# Patient Record
Sex: FEMALE | Race: WHITE | NOT HISPANIC OR LATINO | Employment: OTHER | URBAN - METROPOLITAN AREA
[De-identification: names, ages, dates, MRNs, and addresses within clinical notes are randomized per-mention and may not be internally consistent; named-entity substitution may affect disease eponyms.]

---

## 2022-07-01 ENCOUNTER — APPOINTMENT (OUTPATIENT)
Dept: CT IMAGING | Facility: HOSPITAL | Age: 87
End: 2022-07-01

## 2022-07-01 ENCOUNTER — APPOINTMENT (OUTPATIENT)
Dept: GENERAL RADIOLOGY | Facility: HOSPITAL | Age: 87
End: 2022-07-01

## 2022-07-01 ENCOUNTER — HOSPITAL ENCOUNTER (INPATIENT)
Facility: HOSPITAL | Age: 87
LOS: 4 days | Discharge: NURSING, REHAB OR LONG-TERM CARE | End: 2022-07-06
Attending: EMERGENCY MEDICINE | Admitting: INTERNAL MEDICINE

## 2022-07-01 DIAGNOSIS — I66.21 OCCLUSION AND STENOSIS OF RIGHT POSTERIOR CEREBRAL ARTERY: ICD-10-CM

## 2022-07-01 DIAGNOSIS — H53.9 VISUAL DISTURBANCE: Primary | ICD-10-CM

## 2022-07-01 DIAGNOSIS — I67.1 SUPRACLINOID CAROTID ARTERY ANEURYSM, SMALL: ICD-10-CM

## 2022-07-01 DIAGNOSIS — R41.841 COGNITIVE COMMUNICATION DEFICIT: ICD-10-CM

## 2022-07-01 PROBLEM — R20.2 PARESTHESIA AND PAIN OF LEFT EXTREMITY: Status: ACTIVE | Noted: 2022-07-01

## 2022-07-01 PROBLEM — M79.609 PARESTHESIA AND PAIN OF LEFT EXTREMITY: Status: ACTIVE | Noted: 2022-07-01

## 2022-07-01 PROBLEM — I48.91 A-FIB: Status: ACTIVE | Noted: 2022-07-01

## 2022-07-01 PROBLEM — R27.0 ATAXIA: Status: ACTIVE | Noted: 2022-07-01

## 2022-07-01 PROBLEM — R51.9 HEADACHE: Status: ACTIVE | Noted: 2022-07-01

## 2022-07-01 LAB
ALBUMIN SERPL-MCNC: 4.2 G/DL (ref 3.5–5.2)
ALBUMIN/GLOB SERPL: 1.9 G/DL
ALP SERPL-CCNC: 102 U/L (ref 39–117)
ALT SERPL W P-5'-P-CCNC: 14 U/L (ref 1–33)
ANION GAP SERPL CALCULATED.3IONS-SCNC: 10 MMOL/L (ref 5–15)
AST SERPL-CCNC: 17 U/L (ref 1–32)
BACTERIA UR QL AUTO: ABNORMAL /HPF
BASOPHILS # BLD AUTO: 0.02 10*3/MM3 (ref 0–0.2)
BASOPHILS NFR BLD AUTO: 0.4 % (ref 0–1.5)
BILIRUB SERPL-MCNC: 0.6 MG/DL (ref 0–1.2)
BILIRUB UR QL STRIP: NEGATIVE
BUN SERPL-MCNC: 16 MG/DL (ref 8–23)
BUN/CREAT SERPL: 26.2 (ref 7–25)
CALCIUM SPEC-SCNC: 9.1 MG/DL (ref 8.6–10.5)
CHLORIDE SERPL-SCNC: 107 MMOL/L (ref 98–107)
CLARITY UR: CLEAR
CO2 SERPL-SCNC: 24 MMOL/L (ref 22–29)
COLOR UR: YELLOW
CREAT SERPL-MCNC: 0.61 MG/DL (ref 0.57–1)
DEPRECATED RDW RBC AUTO: 43.8 FL (ref 37–54)
EGFRCR SERPLBLD CKD-EPI 2021: 86.7 ML/MIN/1.73
EOSINOPHIL # BLD AUTO: 0.06 10*3/MM3 (ref 0–0.4)
EOSINOPHIL NFR BLD AUTO: 1.2 % (ref 0.3–6.2)
ERYTHROCYTE [DISTWIDTH] IN BLOOD BY AUTOMATED COUNT: 13.8 % (ref 12.3–15.4)
FLUAV SUBTYP SPEC NAA+PROBE: NOT DETECTED
FLUBV RNA ISLT QL NAA+PROBE: NOT DETECTED
FOLATE SERPL-MCNC: 19.7 NG/ML (ref 4.78–24.2)
GLOBULIN UR ELPH-MCNC: 2.2 GM/DL
GLUCOSE SERPL-MCNC: 102 MG/DL (ref 65–99)
GLUCOSE UR STRIP-MCNC: NEGATIVE MG/DL
HCT VFR BLD AUTO: 44.2 % (ref 34–46.6)
HGB BLD-MCNC: 15.3 G/DL (ref 12–15.9)
HGB UR QL STRIP.AUTO: NEGATIVE
HOLD SPECIMEN: NORMAL
HYALINE CASTS UR QL AUTO: ABNORMAL /LPF
IMM GRANULOCYTES # BLD AUTO: 0.01 10*3/MM3 (ref 0–0.05)
IMM GRANULOCYTES NFR BLD AUTO: 0.2 % (ref 0–0.5)
KETONES UR QL STRIP: NEGATIVE
LEUKOCYTE ESTERASE UR QL STRIP.AUTO: ABNORMAL
LYMPHOCYTES # BLD AUTO: 0.64 10*3/MM3 (ref 0.7–3.1)
LYMPHOCYTES NFR BLD AUTO: 12.5 % (ref 19.6–45.3)
MAGNESIUM SERPL-MCNC: 2 MG/DL (ref 1.6–2.4)
MCH RBC QN AUTO: 30.2 PG (ref 26.6–33)
MCHC RBC AUTO-ENTMCNC: 34.6 G/DL (ref 31.5–35.7)
MCV RBC AUTO: 87.2 FL (ref 79–97)
MONOCYTES # BLD AUTO: 0.43 10*3/MM3 (ref 0.1–0.9)
MONOCYTES NFR BLD AUTO: 8.4 % (ref 5–12)
NEUTROPHILS NFR BLD AUTO: 3.96 10*3/MM3 (ref 1.7–7)
NEUTROPHILS NFR BLD AUTO: 77.3 % (ref 42.7–76)
NITRITE UR QL STRIP: NEGATIVE
NRBC BLD AUTO-RTO: 0 /100 WBC (ref 0–0.2)
PH UR STRIP.AUTO: 7.5 [PH] (ref 5–8)
PLATELET # BLD AUTO: 213 10*3/MM3 (ref 140–450)
PMV BLD AUTO: 10.5 FL (ref 6–12)
POTASSIUM SERPL-SCNC: 3.7 MMOL/L (ref 3.5–5.2)
PROT SERPL-MCNC: 6.4 G/DL (ref 6–8.5)
PROT UR QL STRIP: NEGATIVE
RBC # BLD AUTO: 5.07 10*6/MM3 (ref 3.77–5.28)
RBC # UR STRIP: ABNORMAL /HPF
REF LAB TEST METHOD: ABNORMAL
SARS-COV-2 RNA PNL SPEC NAA+PROBE: NOT DETECTED
SODIUM SERPL-SCNC: 141 MMOL/L (ref 136–145)
SP GR UR STRIP: 1.04 (ref 1–1.03)
SQUAMOUS #/AREA URNS HPF: ABNORMAL /HPF
TROPONIN T SERPL-MCNC: 0.01 NG/ML (ref 0–0.03)
TSH SERPL DL<=0.05 MIU/L-ACNC: 2.04 UIU/ML (ref 0.27–4.2)
UROBILINOGEN UR QL STRIP: ABNORMAL
VIT B12 BLD-MCNC: 267 PG/ML (ref 211–946)
WBC # UR STRIP: ABNORMAL /HPF
WBC NRBC COR # BLD: 5.12 10*3/MM3 (ref 3.4–10.8)
WHOLE BLOOD HOLD COAG: NORMAL
WHOLE BLOOD HOLD SPECIMEN: NORMAL

## 2022-07-01 PROCEDURE — 70496 CT ANGIOGRAPHY HEAD: CPT

## 2022-07-01 PROCEDURE — 0 IOPAMIDOL PER 1 ML: Performed by: EMERGENCY MEDICINE

## 2022-07-01 PROCEDURE — 85025 COMPLETE CBC W/AUTO DIFF WBC: CPT

## 2022-07-01 PROCEDURE — 99223 1ST HOSP IP/OBS HIGH 75: CPT | Performed by: INTERNAL MEDICINE

## 2022-07-01 PROCEDURE — 93005 ELECTROCARDIOGRAM TRACING: CPT | Performed by: EMERGENCY MEDICINE

## 2022-07-01 PROCEDURE — 84443 ASSAY THYROID STIM HORMONE: CPT | Performed by: INTERNAL MEDICINE

## 2022-07-01 PROCEDURE — 81001 URINALYSIS AUTO W/SCOPE: CPT | Performed by: EMERGENCY MEDICINE

## 2022-07-01 PROCEDURE — 70498 CT ANGIOGRAPHY NECK: CPT

## 2022-07-01 PROCEDURE — 80053 COMPREHEN METABOLIC PANEL: CPT | Performed by: EMERGENCY MEDICINE

## 2022-07-01 PROCEDURE — 83735 ASSAY OF MAGNESIUM: CPT | Performed by: EMERGENCY MEDICINE

## 2022-07-01 PROCEDURE — 99285 EMERGENCY DEPT VISIT HI MDM: CPT

## 2022-07-01 PROCEDURE — 36415 COLL VENOUS BLD VENIPUNCTURE: CPT

## 2022-07-01 PROCEDURE — 82746 ASSAY OF FOLIC ACID SERUM: CPT

## 2022-07-01 PROCEDURE — 71045 X-RAY EXAM CHEST 1 VIEW: CPT

## 2022-07-01 PROCEDURE — 84484 ASSAY OF TROPONIN QUANT: CPT | Performed by: EMERGENCY MEDICINE

## 2022-07-01 PROCEDURE — 99223 1ST HOSP IP/OBS HIGH 75: CPT

## 2022-07-01 PROCEDURE — 82607 VITAMIN B-12: CPT

## 2022-07-01 PROCEDURE — 87636 SARSCOV2 & INF A&B AMP PRB: CPT | Performed by: EMERGENCY MEDICINE

## 2022-07-01 RX ORDER — ATORVASTATIN CALCIUM 40 MG/1
40 TABLET, FILM COATED ORAL NIGHTLY
Status: DISCONTINUED | OUTPATIENT
Start: 2022-07-01 | End: 2022-07-03

## 2022-07-01 RX ORDER — SODIUM CHLORIDE 0.9 % (FLUSH) 0.9 %
10 SYRINGE (ML) INJECTION AS NEEDED
Status: DISCONTINUED | OUTPATIENT
Start: 2022-07-01 | End: 2022-07-06 | Stop reason: HOSPADM

## 2022-07-01 RX ORDER — ASPIRIN 81 MG/1
81 TABLET, CHEWABLE ORAL DAILY
Status: DISCONTINUED | OUTPATIENT
Start: 2022-07-01 | End: 2022-07-02

## 2022-07-01 RX ORDER — ASPIRIN 300 MG/1
300 SUPPOSITORY RECTAL DAILY
Status: DISCONTINUED | OUTPATIENT
Start: 2022-07-01 | End: 2022-07-02

## 2022-07-01 RX ORDER — SODIUM CHLORIDE 0.9 % (FLUSH) 0.9 %
10 SYRINGE (ML) INJECTION EVERY 12 HOURS SCHEDULED
Status: DISCONTINUED | OUTPATIENT
Start: 2022-07-01 | End: 2022-07-06 | Stop reason: HOSPADM

## 2022-07-01 RX ADMIN — ASPIRIN 300 MG: 300 SUPPOSITORY RECTAL at 18:30

## 2022-07-01 RX ADMIN — Medication 10 ML: at 22:28

## 2022-07-01 RX ADMIN — ATORVASTATIN CALCIUM 40 MG: 40 TABLET, FILM COATED ORAL at 22:57

## 2022-07-01 RX ADMIN — IOPAMIDOL 75 ML: 755 INJECTION, SOLUTION INTRAVENOUS at 15:38

## 2022-07-01 NOTE — H&P
"    [facility]  HISTORY AND PHYSICAL    Patient Name: Yolie Jacob  : 1935  MRN: [id number]  Primary Care Physician: Provider, No Known  Date of admission: 2022    Subjective   Subjective     Chief Complaint:  Ataxia, headache, dizziness, blurry vision    HPI:  Yolie Jacob is a 87 y.o. female PMH afib (eliquis), hx of COVID (2021) presenting with ataxia, headache, dizziness, blurry vision, decreased sensation to the left arm and left leg. Pt states she was discharged from [facility] yesterday. 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 She was discharged home to follow up with Dr Amato. Today, CTA head/neck showed possible P2 occlusion. Stroke team consulted. Pt reports taking eliquis daily but has not taken a dose since 22 because she [facility] to [facility].     Review of Systems   Constitutional: Positive for activity change and fatigue.   Eyes: Positive for visual disturbance.        Blurred vision   Respiratory: Negative.    Cardiovascular: Negative.    Gastrointestinal: Negative.    Endocrine: Negative.    Genitourinary: Positive for frequency.   Musculoskeletal: Positive for arthralgias and gait problem.   Skin: Negative.    Allergic/Immunologic: Negative.    Neurological: Positive for dizziness, weakness, numbness and headaches.        Left arm and left leg paresthesia. Bilateral lower extremity weakness.    Hematological: Negative.    Psychiatric/Behavioral: Positive for confusion.        Pt is A&OX3 but she states her head feels \"fuzzy\".         All other systems reviewed and are negative.     Personal History     Past Medical History:   Diagnosis Date   • A-fib (HCC)        Past Surgical History:   Procedure Laterality Date   • APPENDECTOMY     • BREAST SURGERY     • CATARACT " EXTRACTION     •  SECTION     • EYE SURGERY     • HEMORRHOIDECTOMY     • INTRAOCULAR LENS INSERTION     • TONSILLECTOMY         Family History:  family history is not on file. Otherwise pertinent FHx was reviewed and unremarkable.     Social History:  reports that she has never smoked. She does not have any smokeless tobacco history on file. She reports that she does not drink alcohol and does not use drugs.  Social History     Social History Narrative   • Not on file       Medications:  Apixaban, Multiple Vitamins-Minerals, b complex-vitamin c-folic acid, mupirocin, and triamcinolone    Allergies   Allergen Reactions   • Codeine Other (See Comments)     Passed out   • Morphine Itching   • Penicillins Itching   • Sulfa Antibiotics Hives   • Doxycycline Palpitations   • Keflex [Cephalexin] Palpitations       Objective   Objective     Vital Signs:   Temp:  [98.4 °F (36.9 °C)] 98.4 °F (36.9 °C)  Heart Rate:  [72-81] 75  Resp:  [16-18] 16  BP: (152-183)/() 152/89    Physical Exam   Constitutional: Awake, alert, NAD  HENT: NCAT, mucous membranes dry  Respiratory: Clear to auscultation bilaterally, nonlabored respirations   Cardiovascular: RRR, no murmurs, rubs, or gallops, palpable pedal pulses bilaterally  Gastrointestinal: Positive bowel sounds, soft, nontender, nondistended  Musculoskeletal: No bilateral ankle edema, no clubbing or cyanosis to extremities  Psychiatric: Appropriate affect, cooperative  Neurologic: Oriented x 3, decreased sensation to the LUE/LLE. Slight facial droop, lower extremity weakness, speech clear  Skin: No rashes    Results Reviewed:  I have personally reviewed most recent indicated data and agree with findings including:  [x]  Laboratory  [x]  Radiology  [x]  EKG/Telemetry  []  Pathology  []  Cardiac/Vascular Studies  []  Old records  []  Other:  Most pertinent findings include:  CTA head and neck showed possible P2 occlusion.    LAB RESULTS:      Lab 22  1440   WBC 5.12    HEMOGLOBIN 15.3   HEMATOCRIT 44.2   PLATELETS 213   NEUTROS ABS 3.96   IMMATURE GRANS (ABS) 0.01   LYMPHS ABS 0.64*   MONOS ABS 0.43   EOS ABS 0.06   MCV 87.2         Lab 07/01/22  1440   SODIUM 141   POTASSIUM 3.7   CHLORIDE 107   CO2 24.0   ANION GAP 10.0   BUN 16   CREATININE 0.61   EGFR 86.7   GLUCOSE 102*   CALCIUM 9.1   MAGNESIUM 2.0         Lab 07/01/22  1440   TOTAL PROTEIN 6.4   ALBUMIN 4.20   GLOBULIN 2.2   ALT (SGPT) 14   AST (SGOT) 17   BILIRUBIN 0.6   ALK PHOS 102         Lab 07/01/22  1440   TROPONIN T 0.014                 Brief Urine Lab Results  (Last result in the past 365 days)      Color   Clarity   Blood   Leuk Est   Nitrite   Protein   CREAT   Urine HCG        07/01/22 1557 Yellow   Clear   Negative   Trace   Negative   Negative               Microbiology Results (last 10 days)     Procedure Component Value - Date/Time    COVID PRE-OP / PRE-PROCEDURE SCREENING ORDER (NO ISOLATION) - Swab, Nasopharynx [[id number]]  (Normal) Collected: 07/01/22 1555    Lab Status: Final result Specimen: Swab from Nasopharynx Updated: 07/01/22 1617    Narrative:      The following orders were created for panel order COVID PRE-OP / PRE-PROCEDURE SCREENING ORDER (NO ISOLATION) - Swab, Nasopharynx.  Procedure                               Abnormality         Status                     ---------                               -----------         ------                     COVID-19 and FLU A/B PCR...[[id number]]  Normal              Final result                 Please view results for these tests on the individual orders.    COVID-19 and FLU A/B PCR - Swab, Nasopharynx [[id number]]  (Normal) Collected: 07/01/22 1555    Lab Status: Final result Specimen: Swab from Nasopharynx Updated: 07/01/22 1617     COVID19 Not Detected     Influenza A PCR Not Detected     Influenza B PCR Not Detected    Narrative:      Fact sheet for providers: https://www.fda.gov/media/841875/download    Fact sheet for patients:  https://www.fda.gov/media/051563/download    Test performed by PCR.          CT Angiogram Neck    Result Date: 7/1/2022  CT ANGIOGRAM HEAD-, CT ANGIOGRAM NECK-  Date of Exam: 7/1/2022 3:33 PM  Indication: headache, h/o recently dxd aneurysm.  Comparison Exams: None available.  Technique:  Multiple axial images were obtained from the aortic arch through the vertex before and after the administration contrast for CT angiogram of the brain. Volumetric imaging was performed and multiplanar and 3-dimensional angiographic images were reformatted on an independent workstation. Automatic exposure intraoperative reconstruction methods were utilized to minimize radiation dose.   FINDINGS:  CTA NECK: There is mild calcified plaque of the aortic arch.  Origins of the great vessels appear widely patent.  Right brachiocephalic artery is patent.  Visualized right subclavian artery is patent.  Right common carotid artery is patent without focal stenosis.  No significant plaque the right carotid bifurcation.  No significant stenosis of the right internal or external carotid arteries.  Left common carotid artery is patent without stenosis.  There is minimal calcified plaque the left carotid bifurcation.  No significant stenosis of the left internal or external carotid arteries.  The left subclavian artery appears patent.  Vertebral arteries are codominant and patent without definite stenosis.  Soft tissues of the neck appear within normal limits.  There is calcified granuloma within the medial right lung apex.  Visualized lung apices are otherwise clear.  There are degenerative changes of spine. There are no lytic or sclerotic bony lesions identified.  CTA HEAD: There is fusiform aneurysmal enlargement of the supraclinoid segment of the left internal carotid artery.  This measures up to 8 mm in maximum diameter.  The left middle cerebral artery and anterior cerebral arteries are of normal caliber.  There is irregularity of the left  anterior cerebral artery consistent with intracranial atherosclerosis.  Right intracranial internal carotid artery is patent without focal stenosis or aneurysm.  Right anterior and middle cerebral arteries appear patent without focal stenosis.  Vertebral arteries are patent skull base.  Basilar artery is patent without focal stenosis or occlusion.  The bilateral superior cerebellar arteries appear patent.  There is fetal-type origin of the left posterior cerebral artery.  This arises from the aneurysmal segment of the left internal carotid artery.  There is irregularity of the distal branch of the left posterior cerebral artery compatible with intracranial atherosclerosis.  There is occlusion of the P2 segment of the right posterior cerebral artery.  No posterior circulation aneurysm identified.  There is no pathologic intracranial contrast enhancement.  Globes and orbits appear within normal limits.  Visualized paranasal sinuses and mastoid air cells appear clear.      Impression:  1.  No evidence of carotid or vertebral artery stenosis. 2.  Fusiform aneurysmal enlargement of the supraclinoid segment of the left internal carotid artery.  The left anterior cerebral and middle cerebral arteries are of normal caliber. 2.  There is mild irregularity of the left anterior cerebral artery and a distal branch of the left posterior cerebral artery consistent with intracranial atherosclerosis. 3.  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 Findings personally discussed with Dr. Riggins of the emergency department at 4:20 PM on 7/1/2022  This report was finalized on 7/1/2022 4:25 PM by Noman Varghese MD.      XR Chest 1 View    Result Date: 7/1/2022  DATE OF EXAM: 7/1/2022 2:04 PM  PROCEDURE: XR CHEST 1 VW-  INDICATIONS: Weak/Dizzy/AMS triage protocol  COMPARISON: No comparisons available.  TECHNIQUE: Single radiographic AP view of the chest was obtained.  FINDINGS: History indicates generalized  weakness and dizziness. The heart shadow is in the upper range of normal size. Pulmonary vasculature appears normal. Lungs are well-expanded to mildly hyperinflated, and show a coarsened, mildly reticular interstitial disease pattern which may reflect mild senescent change, possibly changes of obstructive or reactive airways disease. There is a small area of linear scarring in the left lateral mid lung, but no other significant focal disease. No effusion or pneumothorax is seen.      Impression: Borderline heart shadow enlargement and mild chronic appearing interstitial changes. No clearly acute chest pathology is identified.  This report was finalized on 7/1/2022 2:20 PM by Dr. Mil Betancourt MD.      CT Angiogram Head    Result Date: 7/1/2022  CT ANGIOGRAM HEAD-, CT ANGIOGRAM NECK-  Date of Exam: 7/1/2022 3:33 PM  Indication: headache, h/o recently dxd aneurysm.  Comparison Exams: None available.  Technique:  Multiple axial images were obtained from the aortic arch through the vertex before and after the administration contrast for CT angiogram of the brain. Volumetric imaging was performed and multiplanar and 3-dimensional angiographic images were reformatted on an independent workstation. Automatic exposure intraoperative reconstruction methods were utilized to minimize radiation dose.   FINDINGS:  CTA NECK: There is mild calcified plaque of the aortic arch.  Origins of the great vessels appear widely patent.  Right brachiocephalic artery is patent.  Visualized right subclavian artery is patent.  Right common carotid artery is patent without focal stenosis.  No significant plaque the right carotid bifurcation.  No significant stenosis of the right internal or external carotid arteries.  Left common carotid artery is patent without stenosis.  There is minimal calcified plaque the left carotid bifurcation.  No significant stenosis of the left internal or external carotid arteries.  The left subclavian artery appears  patent.  Vertebral arteries are codominant and patent without definite stenosis.  Soft tissues of the neck appear within normal limits.  There is calcified granuloma within the medial right lung apex.  Visualized lung apices are otherwise clear.  There are degenerative changes of spine. There are no lytic or sclerotic bony lesions identified.  CTA HEAD: There is fusiform aneurysmal enlargement of the supraclinoid segment of the left internal carotid artery.  This measures up to 8 mm in maximum diameter.  The left middle cerebral artery and anterior cerebral arteries are of normal caliber.  There is irregularity of the left anterior cerebral artery consistent with intracranial atherosclerosis.  Right intracranial internal carotid artery is patent without focal stenosis or aneurysm.  Right anterior and middle cerebral arteries appear patent without focal stenosis.  Vertebral arteries are patent skull base.  Basilar artery is patent without focal stenosis or occlusion.  The bilateral superior cerebellar arteries appear patent.  There is fetal-type origin of the left posterior cerebral artery.  This arises from the aneurysmal segment of the left internal carotid artery.  There is irregularity of the distal branch of the left posterior cerebral artery compatible with intracranial atherosclerosis.  There is occlusion of the P2 segment of the right posterior cerebral artery.  No posterior circulation aneurysm identified.  There is no pathologic intracranial contrast enhancement.  Globes and orbits appear within normal limits.  Visualized paranasal sinuses and mastoid air cells appear clear.      Impression:  1.  No evidence of carotid or vertebral artery stenosis. 2.  Fusiform aneurysmal enlargement of the supraclinoid segment of the left internal carotid artery.  The left anterior cerebral and middle cerebral arteries are of normal caliber. 2.  There is mild irregularity of the left anterior cerebral artery and a distal  branch of the left posterior cerebral artery consistent with intracranial atherosclerosis. 3.  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 Findings personally discussed with Dr. Riggins of the emergency department at 4:20 PM on 7/1/2022  This report was finalized on 7/1/2022 4:25 PM by Noman Varghese MD.            Assessment & Plan   Assessment & Plan       Visual disturbance    Ataxia    Paresthesia and pain of left extremity    Headache    A-fib ([facility])    Hospital Course to date:  Yolie Jacob is a 87 y.o. female PMH afib (eliquis), hx of COVID (08/2021) presenting with ataxia, headache, dizziness, blurry vision, decreased sensation to the left arm and left leg.     Patient with disturbance, headache, ataxia, left-sided paresthesia (rule out CVA)  Right sided p2 (of right pca) occlusion on CT Angiogram  LICA Supraclinoid aneurysm 9 mm  -patient reports recently taking eliquis 2.5mg once daily instead of twice daily,? Possibly contributed to tia vs cva.  - Recently discharged from [facility] reporting aneurysm.  - CTA head and neck showed possible right P2 occlusion unsure if it is acute or chronic  - MRI brain without contrast pending; per neuro-stroke team if no large CVA found on the mri restart eliquis (patient states was on 2.5mg bid)  - Aspirin 81 mg daily, atorvastatin 40 mg at bedtime.  - A1c, lipid panel, TTE; further stroke workup/orders per neuro service  - PT/OT/SLP    Atrial fibrillation, rate controlled  - Patient reports taking Eliquis 2.5 mg daily prior to symptoms (instead of her prescribed 2.5mg bid). More recently had been taken off eliquis altogether since [facility].       HLD  - Atorvastatin 40 mg daily.  Lipid panel in a.m.        DVT prophylaxis: Compression device, planning to resume eliquis if MRI of head is appropriate.     CODE STATUS:    Level Of Support Discussed With: Patient  Code Status (Patient has no pulse  and is not breathing): CPR (Attempt to Resuscitate)  Medical Interventions (Patient has pulse or is breathing): Full Support      This note has been completed as part of a split-shared workflow.     Electronically signed by MEGHAN Laguna, 07/01/22, 6:28 PM EDT.      Attending   Admission Attestation       I have seen and examined the patient, performing an independent face-to-face diagnostic evaluation with plan of care reviewed and developed with the advanced practice clinician (APC).      Brief Summary Statement:   Yolie Jacob is a 87 y.o. female w/ hx afib, hl, recently admitted at [facility] few days ago for left sided arm and leg tingling, ataxia. Imaging apparently showed small aneurysm, eliquis originally held. Ultimately was d/c'd home yesterday, still with left sided parasthesias. Was told to restart her eliquis and present to [facility] if any worsening of symtpoms. Did not take her home eliquis. Today presented to [facility] due to persistence of symptoms, denies any worsening. Left sided arm tingling, facial tingling, bilateral lower extremity balance difficulties. No chest pain or palpitations. CtA h&n revealed left fusiform aneurysmal enlargement supraclinoid segment of the left ICA, mild luminal dz of left jessa and distal left pca, occlusion p2 segment right pca. Neuro-stroke service evaluated in ED, ordererd mri and requested admission  Remainder of detailed HPI is as noted by APC and has been reviewed and/or edited by me for completeness.    Attending Physical Exam:  Constitutional:Alert, oriented x 3, nontoxic appearing  Psych:Normal/appropriate affect  HEENT:NCAT, oropharynx clear  Neck: neck supple, full range of motion  Neuro: Face symmetric, speech clear,  Moves all extermities, gait not assesessed  Cardiac: RRR; No pretibial pitting edema  Resp: CTAB, normal effort  GI: abd soft, nontender  Skin: No extremity rash  Musculoskeletal/extremities: no cyanosis of extremities; no significant ankle  edema          Brief Assessment/Plan :  See detailed assessment and plan developed with APC which I have reviewed and/or edited for completeness.        Admission Status: I believe that this patient meets inpatient status due to suspected CVA, ataxia w/ further workup required and expected stay > 2 midnights      Mendel Guerrero MD  07/01/22

## 2022-07-01 NOTE — CONSULTS
Stroke Consult Note    Patient Name: Yolie Jacob   MRN: [id number]  Age: 87 y.o.  Sex: female  : 1935    Primary Care Physician: Provider, No Known  Referring Physician:  Dr. Emerson Riggins    TIME STROKE TEAM CALLED: 1628 EST     TIME PATIENT SEEN: 1640 EST    Handedness: Right  Race:     Chief Complaint/Reason for Consultation: Headache, dizziness, blurry vision, generalized weakness, gait instability, and left-sided paresthesias    HPI: Mrs. Jacob is an 87 year old female with known medical diagnoses of atrial fibrillation (on chronic Eliquis) and remote COVID-19 infection (Aug 2021) who presents to the ED today for further evaluation of generalized weakness, headache, blurry vision, gait instability, and dizziness x 3 days.  She was seen at [facility] ED yesterday with complaints of waxing and waning left-sided sensory deficits and intermitent confusion.  She underwent CT head which was negative for hemorrhage and/or acute process. CTA head/neck reportedly revealed a 9mm LMCA aneurysm as well as LM1 stenosis.  She was discharged home with an outpatient follow-up with Dr. Amato however she presented to our ED today with complaints of visual disturbance (blurry vision bilaterally).  She underwent another CTA head/neck today which was concerning for right P2 occlusion therefore the stroke team was consulted for further evaluation and recommendations.  Aneurysm is located within the supraclinoid segment of the LICA, not LMCA as previously reported by KATJA Ornelas.    The patient tells me that she has only been taking her Eliquis once daily however has not taken a dose since 2022 as the ED physician at [facility] told her not to take this medication due to her aneurysm.  She is a non-smoker, does not drink alcohol, and does not use illicit drugs.    Last Known Normal Date/Time: 2022 EST     Review of Systems   Constitutional: Positive for fatigue. Negative for chills and fever.    HENT: Positive for congestion. Negative for nosebleeds, rhinorrhea and trouble swallowing.    Eyes: Positive for visual disturbance. Negative for photophobia and pain.   Respiratory: Negative for cough, chest tightness and shortness of breath.    Cardiovascular: Negative for chest pain and palpitations.   Gastrointestinal: Negative for blood in stool, constipation, diarrhea, nausea and vomiting.   Genitourinary: Positive for urgency. Negative for difficulty urinating, dysuria and hematuria.   Musculoskeletal: Positive for arthralgias and gait problem.   Skin: Negative.    Neurological: Positive for dizziness, weakness, numbness and headaches. Negative for seizures, syncope, facial asymmetry and speech difficulty.   Hematological: Negative.    Psychiatric/Behavioral: Positive for confusion.      Past Medical History:   Diagnosis Date   • A-fib (HCC)      Past Surgical History:   Procedure Laterality Date   • APPENDECTOMY     • BREAST SURGERY     • CATARACT EXTRACTION     •  SECTION     • EYE SURGERY     • HEMORRHOIDECTOMY     • INTRAOCULAR LENS INSERTION     • TONSILLECTOMY       History reviewed. No pertinent family history.  Social History     Socioeconomic History   • Marital status:    Tobacco Use   • Smoking status: Never Smoker   Substance and Sexual Activity   • Alcohol use: No   • Drug use: No   • Sexual activity: Defer     Allergies   Allergen Reactions   • Codeine Other (See Comments)     Passed out   • Morphine Itching   • Penicillins Itching   • Sulfa Antibiotics Hives   • Doxycycline Palpitations   • Keflex [Cephalexin] Palpitations     Prior to Admission medications    Medication Sig Start Date End Date Taking? Authorizing Provider   Apixaban (ELIQUIS PO) Take  by mouth.    Emergency, Nurse STEVEN Hsieh   b complex-vitamin c-folic acid (NEPHRO-IRMA) 0.8 MG tablet tablet Take 1 tablet by mouth Daily.    Emergency, Nurse Epic, RN   Multiple Vitamins-Minerals (EYE VITAMINS PO) Take  by mouth.     Emergency, Nurse Epic, RN   mupirocin (BACTROBAN) 2 % ointment Apply  topically to the appropriate area as directed 3 (Three) Times a Day. 7/28/20   Sumit Chavez MD   triamcinolone (KENALOG) 0.1 % cream Apply  topically to the appropriate area as directed 3 (Three) Times a Day. 7/28/20   Sumit Chavez MD         Temp:  [98.4 °F (36.9 °C)] 98.4 °F (36.9 °C)  Heart Rate:  [72-81] 77  Resp:  [18] 18  BP: (155-183)/() 155/102  Neurological Exam  Mental Status  Alert. Oriented to person, place, time and situation. Orientation: Patient seems slightly confused in conversation. Oriented to person, place, and time. Speech is normal. Language is fluent with no aphasia.    Cranial Nerves  CN II: Visual fields full to confrontation.  CN III, IV, VI: Extraocular movements intact bilaterally.   Right pupil: Round. Reactive to light.   Left pupil: 2 mm. Round. Reactive to light. Questionable 3rd nerve palsy.  CN V: Facial sensation is normal.  CN VII: Full and symmetric facial movement.  CN VIII: Hearing intact bilaterally.  CN IX, X: Palate elevates symmetrically  CN XI: Shoulder shrug strength is normal.  CN XII: Tongue midline without atrophy or fasciculations.    Motor  Normal muscle bulk throughout. No fasciculations present. Normal muscle tone. Strength is 5/5 throughout all four extremities.    Sensory  Light touch abnormality: Left face, upper extremity, lower extremity.     Coordination    Finger-to-nose, rapid alternating movements and heel-to-shin normal bilaterally without dysmetria.    Gait    Unsteady.      Physical Exam  Vitals and nursing note reviewed.   Constitutional:       General: She is not in acute distress.     Appearance: Normal appearance. She is not ill-appearing.   HENT:      Head: Normocephalic.      Mouth/Throat:      Mouth: Mucous membranes are dry.   Eyes:      Extraocular Movements: Extraocular movements intact.   Cardiovascular:      Rate and Rhythm: Normal rate and  regular rhythm.   Pulmonary:      Effort: Pulmonary effort is normal. No respiratory distress.      Comments: On room air  Musculoskeletal:      Cervical back: Normal range of motion and neck supple. Tenderness (Right) present.      Right lower leg: No edema.      Left lower leg: No edema.   Skin:     General: Skin is warm and dry.   Neurological:      Mental Status: She is alert and oriented to person, place, and time.      Cranial Nerves: Cranial nerve deficit present.      Sensory: Sensory deficit present.      Motor: Weakness present.      Coordination: Coordination is intact. Coordination normal.      Deep Tendon Reflexes: Strength normal.      Comments: Occasionally confused in conversation   Psychiatric:         Mood and Affect: Mood normal.         Speech: Speech normal.         Behavior: Behavior normal.         Acute Stroke Data    Thrombolytic Inclusion / Exclusion Criteria    Time: 16:29 EDT  Person Administering Scale: MEGHAN Martel    Inclusion Criteria  [x]   18 years of age or greater   []   Onset of symptoms < 4.5 hours before beginning treatment (stroke onset = time patient was last seen well or without symptoms).   []   Diagnosis of acute ischemic stroke causing measurable disabling deficit (Complete Hemianopia, Any Aphasia, Visual or Sensory Extinction, Any weakness limiting sustained effort against gravity)   []   Any remaining deficit considered potentially disabling in view of patient and practitioner   Exclusion criteria (Do not proceed with Alteplase if any are checked under exclusion criteria)  [x]   Onset unknown or GREATER than 4.5 hours   []   ICH on CT/MRI   []   CT demonstrates hypodensity representing acute or subacute infarct   []   Significant head trauma or prior stroke in the previous 3 months   []   Symptoms suggestive of subarachnoid hemorrhage   []   History of un-ruptured intracranial aneurysm GREATER than 10 mm   []   Recent intracranial or intraspinal surgery  within the last 3 months   []   Arterial puncture at a non-compressible site in the previous 7 days   []   Active internal bleeding   []   Acute bleeding tendency   []   Platelet count LESS than 100,000 for known hematological diseases such as leukemia, thrombocytopenia or chronic cirrhosis   []   Current use of anticoagulant with INR GREATER than 1.7 or PT GREATER than 15 seconds, aPTT GREATER than 40 seconds   []   Heparin received within 48 hours, resulting in abnormally elevated aPTT GREATER than upper limit of normal   [x]   Current use of direct thrombin inhibitors or direct factor Xa inhibitors in the past 48 hours   []   Elevated blood pressure refractory to treatment (systolic GREATER than 185 mm/Hg or diastolic  GREATER than 110 mm/Hg   []   Suspected infective endocarditis and aortic arch dissection   []   Current use of therapeutic treatment dose of low-molecular-weight heparin (LMWH) within the previous 24 hours   []   Structural GI malignancy or bleed   Relative exclusion for all patients  []   Only minor non-disabling symptoms   []   Pregnancy   []   Seizure at onset with postictal residual neurological impairments   []   Major surgery or previous trauma within past 14 days   []   History of previous spontaneous ICH, intracranial neoplasm, or AV malformation   []   Postpartum (within previous 14 days)   []   Recent GI or urinary tract hemorrhage (within previous 21 days)   []   Recent acute MI (within previous 3 months)   []   History of un-ruptured intracranial aneurysm LESS than 10 mm   []   History of ruptured intracranial aneurysm   []   Blood glucose LESS than 50 mg/dL (2.7 mmol/L)   []   Dural puncture within the last 7 days   []   Known GREATER than 10 cerebral microbleeds   Additional exclusions for patients with symptoms onset between 3 and 4.5 hours.  [x]   Age > 80.   [x]   On any anticoagulants regardless of INR  >>> Warfarin (Coumadin), Heparin, Enoxaparin (Lovenox), fondaparinux  (Arixtra), bivalirudin (Angiomax), Argatroban, dabigatran (Pradaxa), rivaroxaban (Xarelto), or apixaban (Eliquis)   []   Severe stroke (NIHSS > 25).   []   History of BOTH diabetes and previous ischemic stroke.   []   The risks and benefits have been discussed with the patient or family related to the administration of IV thrombolytic therapy for stroke symptoms.   []   I have discussed and reviewed the patient's case and imaging with the attending prior to IV thrombolytic therapy.   N/A Time IV thrombolytic administered       Hospital Meds:  Scheduled-    Infusions-     PRNs- •  sodium chloride    Functional Status Prior to Current Stroke/Davidson Score: 0; patient states prior to symptom onset she was independent of all of her ADLs and was still driving.    NIH Stroke Scale  Time: 1650  Person Administering Scale: MEGHAN Martel    Interval: baseline  1a. Level of Consciousness: 0-->Alert, keenly responsive  1b. LOC Questions: 0-->Answers both questions correctly  1c. LOC Commands: 0-->Performs both tasks correctly  2. Best Gaze: 0-->Normal  3. Visual: 0-->No visual loss  4. Facial Palsy: 0-->Normal symmetrical movements  5a. Motor Arm, Left: 0-->No drift, limb holds 90 (or 45) degrees for full 10 secs  5b. Motor Arm, Right: 0-->No drift, limb holds 90 (or 45) degrees for full 10 secs  6a. Motor Leg, Left: 0-->No drift, leg holds 30 degree position for full 5 secs  6b. Motor Leg, Right: 0-->No drift, leg holds 30 degree position for full 5 secs  7. Limb Ataxia: 0-->Absent  8. Sensory: 1-->Mild-to-moderate sensory loss, patient feels pinprick is less sharp or is dull on the affected side, or there is a loss of superficial pain with pinprick, but patient is aware of being touched  9. Best Language: 0-->No aphasia, normal  10. Dysarthria: 0-->Normal  11. Extinction and Inattention (formerly Neglect): 0-->No abnormality    Total (NIH Stroke Scale): 1    Results Reviewed:  I have personally reviewed  current lab, radiology, and data and agree with results.    CTA head/neck with no evidence of flow limiting stenosis or LVO.   Left P2 occlusion, acute verses chronic, LICA supraclinoid aneurysm, 9mm.    Urinalysis + for Leuk esterase, negative nitrite, negative bacteria  H/H 15.3/44.2  Platelets 213  Glucose 102  Creatinine 0.61, BUN 16  ALT 14  AST 17  Sodium 141    Assessment/Plan:    This is an 87 year old female with known medical diagnoses of atrial fibrillation (on chronic Eliquis), hyperlipidemia, and remote COVID-19 infection (Aug 2021) who presents to the ED today for further evaluation of generalized weakness, headache, and dizziness x 3 days.  She is not a candidate for IV thrombolytic therapy secondary to chronic use of Eliquis and last known well > 0.5 hours.  She is not a candidate for endovascular therapy secondary to no evidence of large vessel occlusive stroke on CTA head/neck.  She will be admitted to the hospital service for further evaluation.    Antiplatelet PTA: None  Anticoagulant PTA: Eliquis 5 mg twice daily      1. Visual disturbances, left paresthesias, gait instability  -Right P2 occlusion noted on CTA head; unsure if this is acute or chronic  -MRI brain without contrast to further evaluate for stroke  -TIA/CVA orderset without thromboytic therapy has been initiated  -NPO until bedside nursing dysphagia screen completed  -ASA 81mg daily   -Atorvastatin 40mg nightly  -A1c and lipid panel in AM  -TTE   -Activity as tolerated  -PT/OT/SLP evaluation  -High fall risk    2. Atrial fibrilliation, chronic  -Patient states she was only taking Eliquis 5 mg once daily prior to symptoms and has not had a dose since the evening of 6/29/2022 as she was instructed by the emergency department physician at [facility] to discontinue this  -If MRI brain without is negative for large stroke can resume tonight  -Rate control per hospitalist    3. Hyperlipidemia  -Patient reports she does not take  any medicines for cholesterol  -Atorvastatin 40 mg nightly  -Lipid panel in a.m.    4. 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 Amato  -Strict blood pressure management, <160/80  -Add ASA 81mg daiy    Plan of care was discussed with the patient, family, and Dr. Riggins.  Stroke neurology will continue to follow.  Please call with any questions or concerns.  Thank you for this consult.    Tahira Urban, APRMITALI  July 1, 2022  16:29 EDT

## 2022-07-01 NOTE — ED PROVIDER NOTES
" EMERGENCY DEPARTMENT ENCOUNTER    Pt Name: Yolie Jacob  MRN: [id number]  Pt :   1935  Room Number:  KWABENA/KWABENA  Date of encounter:  2022  PCP: Provider, No Known  ED Provider: Emerson Riggins MD    Historian: Patient      HPI:  Chief Complaint: Generalized weakness, multiple complaints.        Context: Yolie Jacob is a 87 y.o. female who presents to the ED c/o symptoms which patient reports have been ongoing for between 2 and 3 days.  Patient reports that she was seen at [facility] and admitted 2 or 3 days ago, being discharged yesterday.  The only abnormality she is aware of is a \"aneurysm\".  The patient reports that she was admitted at that facility secondary to numbness in both hands and both feet with difficulty walking.  She notes that the headache she presented to that facility with is still present.  She notes generalized weakness and dizziness.  She notes some \"blurry vision\" which has been present for the last 2 to 3 days as well.  The patient lives with her son.  She has been too weak to walk today and actually had to crawl to go to the bathroom.  Her only current numbness is in the lateral portion of her left foot.  Patient was diagnosed with COVID in August.          PAST MEDICAL HISTORY  Past Medical History:   Diagnosis Date   • A-fib ([facility])          PAST SURGICAL HISTORY  Past Surgical History:   Procedure Laterality Date   • APPENDECTOMY     • BREAST SURGERY     • CATARACT EXTRACTION     •  SECTION     • EYE SURGERY     • HEMORRHOIDECTOMY     • INTRAOCULAR LENS INSERTION     • TONSILLECTOMY           FAMILY HISTORY  History reviewed. No pertinent family history.      SOCIAL HISTORY  Social History     Socioeconomic History   • Marital status:    Tobacco Use   • Smoking status: Never Smoker   Substance and Sexual Activity   • Alcohol use: No   • Drug use: No   • Sexual activity: Defer         ALLERGIES  Codeine, Morphine, Penicillins, Sulfa antibiotics, " Doxycycline, and Keflex [cephalexin]        REVIEW OF SYSTEMS  Review of Systems       All systems reviewed and negative except for those discussed in HPI.       PHYSICAL EXAM    I have reviewed the triage vital signs and nursing notes.    ED Triage Vitals [07/01/22 1424]   Temp Heart Rate Resp BP SpO2   98.4 °F (36.9 °C) 81 18 (!) 180/103 98 %      Temp src Heart Rate Source Patient Position BP Location FiO2 (%)   Oral Monitor Lying Right arm --       Physical Exam  GENERAL:   Appears pleasant, nontoxic but generally weak.  HENT: Nares patent.  Slightly dry mucous membranes  EYES: No scleral icterus.  Extraocular movements intact without diplopia  CV: Regular rhythm, regular rate.  No murmurs gallops rubs  RESPIRATORY: Normal effort.  No audible wheezes, rales or rhonchi.  Clear to auscultation  ABDOMEN: Soft, nontender  MUSCULOSKELETAL: No deformities.   NEURO: Alert, moves all extremities, follows commands.  No pronator drift or leg drift.  Fine touch sensation intact in upper and lower extremities.  Cranial nerves intact.  SKIN: Warm, dry, no rash visualized.        LAB RESULTS  Recent Results (from the past 24 hour(s))   Comprehensive Metabolic Panel    Collection Time: 07/01/22  2:40 PM    Specimen: Blood   Result Value Ref Range    Glucose 102 (H) 65 - 99 mg/dL    BUN 16 8 - 23 mg/dL    Creatinine 0.61 0.57 - 1.00 mg/dL    Sodium 141 136 - 145 mmol/L    Potassium 3.7 3.5 - 5.2 mmol/L    Chloride 107 98 - 107 mmol/L    CO2 24.0 22.0 - 29.0 mmol/L    Calcium 9.1 8.6 - 10.5 mg/dL    Total Protein 6.4 6.0 - 8.5 g/dL    Albumin 4.20 3.50 - 5.20 g/dL    ALT (SGPT) 14 1 - 33 U/L    AST (SGOT) 17 1 - 32 U/L    Alkaline Phosphatase 102 39 - 117 U/L    Total Bilirubin 0.6 0.0 - 1.2 mg/dL    Globulin 2.2 gm/dL    A/G Ratio 1.9 g/dL    BUN/Creatinine Ratio 26.2 (H) 7.0 - 25.0    Anion Gap 10.0 5.0 - 15.0 mmol/L    eGFR 86.7 >60.0 mL/min/1.73   Troponin    Collection Time: 07/01/22  2:40 PM    Specimen: Blood   Result  Value Ref Range    Troponin T 0.014 0.000 - 0.030 ng/mL   Magnesium    Collection Time: 07/01/22  2:40 PM    Specimen: Blood   Result Value Ref Range    Magnesium 2.0 1.6 - 2.4 mg/dL   Green Top (Gel)    Collection Time: 07/01/22  2:40 PM   Result Value Ref Range    Extra Tube Hold for add-ons.    Lavender Top    Collection Time: 07/01/22  2:40 PM   Result Value Ref Range    Extra Tube hold for add-on    Gold Top - SST    Collection Time: 07/01/22  2:40 PM   Result Value Ref Range    Extra Tube Hold for add-ons.    Gray Top    Collection Time: 07/01/22  2:40 PM   Result Value Ref Range    Extra Tube Hold for add-ons.    Light Blue Top    Collection Time: 07/01/22  2:40 PM   Result Value Ref Range    Extra Tube Hold for add-ons.    CBC Auto Differential    Collection Time: 07/01/22  2:40 PM    Specimen: Blood   Result Value Ref Range    WBC 5.12 3.40 - 10.80 10*3/mm3    RBC 5.07 3.77 - 5.28 10*6/mm3    Hemoglobin 15.3 12.0 - 15.9 g/dL    Hematocrit 44.2 34.0 - 46.6 %    MCV 87.2 79.0 - 97.0 fL    MCH 30.2 26.6 - 33.0 pg    MCHC 34.6 31.5 - 35.7 g/dL    RDW 13.8 12.3 - 15.4 %    RDW-SD 43.8 37.0 - 54.0 fl    MPV 10.5 6.0 - 12.0 fL    Platelets 213 140 - 450 10*3/mm3    Neutrophil % 77.3 (H) 42.7 - 76.0 %    Lymphocyte % 12.5 (L) 19.6 - 45.3 %    Monocyte % 8.4 5.0 - 12.0 %    Eosinophil % 1.2 0.3 - 6.2 %    Basophil % 0.4 0.0 - 1.5 %    Immature Grans % 0.2 0.0 - 0.5 %    Neutrophils, Absolute 3.96 1.70 - 7.00 10*3/mm3    Lymphocytes, Absolute 0.64 (L) 0.70 - 3.10 10*3/mm3    Monocytes, Absolute 0.43 0.10 - 0.90 10*3/mm3    Eosinophils, Absolute 0.06 0.00 - 0.40 10*3/mm3    Basophils, Absolute 0.02 0.00 - 0.20 10*3/mm3    Immature Grans, Absolute 0.01 0.00 - 0.05 10*3/mm3    nRBC 0.0 0.0 - 0.2 /100 WBC   TSH    Collection Time: 07/01/22  2:40 PM    Specimen: Blood   Result Value Ref Range    TSH 2.040 0.270 - 4.200 uIU/mL   COVID-19 and FLU A/B PCR - Swab, Nasopharynx    Collection Time: 07/01/22  3:55 PM    Specimen:  Nasopharynx; Swab   Result Value Ref Range    COVID19 Not Detected Not Detected - Ref. Range    Influenza A PCR Not Detected Not Detected    Influenza B PCR Not Detected Not Detected   Urinalysis With Microscopic If Indicated (No Culture) - Urine, Clean Catch    Collection Time: 07/01/22  3:57 PM    Specimen: Urine, Clean Catch   Result Value Ref Range    Color, UA Yellow Yellow, Straw    Appearance, UA Clear Clear    pH, UA 7.5 5.0 - 8.0    Specific Gravity, UA 1.042 (H) 1.001 - 1.030    Glucose, UA Negative Negative    Ketones, UA Negative Negative    Bilirubin, UA Negative Negative    Blood, UA Negative Negative    Protein, UA Negative Negative    Leuk Esterase, UA Trace (A) Negative    Nitrite, UA Negative Negative    Urobilinogen, UA 0.2 E.U./dL 0.2 - 1.0 E.U./dL   Urinalysis, Microscopic Only - Urine, Clean Catch    Collection Time: 07/01/22  3:57 PM    Specimen: Urine, Clean Catch   Result Value Ref Range    RBC, UA 0-2 None Seen, 0-2 /HPF    WBC, UA 3-5 (A) None Seen, 0-2 /HPF    Bacteria, UA None Seen None Seen, Trace /HPF    Squamous Epithelial Cells, UA 0-2 None Seen, 0-2 /HPF    Hyaline Casts, UA 0-6 0 - 6 /LPF    Methodology Automated Microscopy        If labs were ordered, I independently reviewed the results.        RADIOLOGY  XR Chest 1 View    Result Date: 7/1/2022  DATE OF EXAM: 7/1/2022 2:04 PM  PROCEDURE: XR CHEST 1 VW-  INDICATIONS: Weak/Dizzy/AMS triage protocol  COMPARISON: No comparisons available.  TECHNIQUE: Single radiographic AP view of the chest was obtained.  FINDINGS: History indicates generalized weakness and dizziness. The heart shadow is in the upper range of normal size. Pulmonary vasculature appears normal. Lungs are well-expanded to mildly hyperinflated, and show a coarsened, mildly reticular interstitial disease pattern which may reflect mild senescent change, possibly changes of obstructive or reactive airways disease. There is a small area of linear scarring in the left  lateral mid lung, but no other significant focal disease. No effusion or pneumothorax is seen.      Borderline heart shadow enlargement and mild chronic appearing interstitial changes. No clearly acute chest pathology is identified.  This report was finalized on 7/1/2022 2:20 PM by Dr. Mil Betancourt MD.      CT Angiogram Head, CT Angiogram Neck    Result Date: 7/1/2022  CT ANGIOGRAM HEAD-, CT ANGIOGRAM NECK-  Date of Exam: 7/1/2022 3:33 PM  Indication: headache, h/o recently dxd aneurysm.  Comparison Exams: None available.  Technique:  Multiple axial images were obtained from the aortic arch through the vertex before and after the administration contrast for CT angiogram of the brain. Volumetric imaging was performed and multiplanar and 3-dimensional angiographic images were reformatted on an independent workstation. Automatic exposure intraoperative reconstruction methods were utilized to minimize radiation dose.   FINDINGS:  CTA NECK: There is mild calcified plaque of the aortic arch.  Origins of the great vessels appear widely patent.  Right brachiocephalic artery is patent.  Visualized right subclavian artery is patent.  Right common carotid artery is patent without focal stenosis.  No significant plaque the right carotid bifurcation.  No significant stenosis of the right internal or external carotid arteries.  Left common carotid artery is patent without stenosis.  There is minimal calcified plaque the left carotid bifurcation.  No significant stenosis of the left internal or external carotid arteries.  The left subclavian artery appears patent.  Vertebral arteries are codominant and patent without definite stenosis.  Soft tissues of the neck appear within normal limits.  There is calcified granuloma within the medial right lung apex.  Visualized lung apices are otherwise clear.  There are degenerative changes of spine. There are no lytic or sclerotic bony lesions identified.  CTA HEAD: There is fusiform  aneurysmal enlargement of the supraclinoid segment of the left internal carotid artery.  This measures up to 8 mm in maximum diameter.  The left middle cerebral artery and anterior cerebral arteries are of normal caliber.  There is irregularity of the left anterior cerebral artery consistent with intracranial atherosclerosis.  Right intracranial internal carotid artery is patent without focal stenosis or aneurysm.  Right anterior and middle cerebral arteries appear patent without focal stenosis.  Vertebral arteries are patent skull base.  Basilar artery is patent without focal stenosis or occlusion.  The bilateral superior cerebellar arteries appear patent.  There is fetal-type origin of the left posterior cerebral artery.  This arises from the aneurysmal segment of the left internal carotid artery.  There is irregularity of the distal branch of the left posterior cerebral artery compatible with intracranial atherosclerosis.  There is occlusion of the P2 segment of the right posterior cerebral artery.  No posterior circulation aneurysm identified.  There is no pathologic intracranial contrast enhancement.  Globes and orbits appear within normal limits.  Visualized paranasal sinuses and mastoid air cells appear clear.       1.  No evidence of carotid or vertebral artery stenosis. 2.  Fusiform aneurysmal enlargement of the supraclinoid segment of the left internal carotid artery.  The left anterior cerebral and middle cerebral arteries are of normal caliber. 2.  There is mild irregularity of the left anterior cerebral artery and a distal branch of the left posterior cerebral artery consistent with intracranial atherosclerosis. 3.  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 Findings personally discussed with Dr. Riggins of the emergency department at 4:20 PM on 7/1/2022  This report was finalized on 7/1/2022 4:25 PM by Noman Varghese MD.        I ordered and reviewed the above noted  radiographic studies.        See radiologist's dictation for official interpretation.        PROCEDURES    Procedures    ECG 12 Lead   Preliminary Result             MEDICATIONS GIVEN IN ER    Medications   sodium chloride 0.9 % flush 10 mL (has no administration in time range)   aspirin chewable tablet 81 mg ( Oral Not Given:  See Alt 7/1/22 1830)     Or   aspirin suppository 300 mg (300 mg Rectal Given 7/1/22 1830)   iopamidol (ISOVUE-370) 76 % injection 100 mL (75 mL Intravenous Given 7/1/22 1538)         PROGRESS, DATA ANALYSIS, CONSULTS, AND MEDICAL DECISION MAKING    All labs have been independently reviewed by me.  All radiology studies have been reviewed by me and the radiologist dictating the report.   EKG's have been independently viewed and interpreted by me.      Differential diagnoses: Acute versus subacute versus chronic CVA.  Dehydration, occult infection, etc. considered as well.      ED Course as of 07/01/22 2114   Fri Jul 01, 2022   1626 I spoke with our radiologist about this patient who notes a complete occlusion of the right posterior cerebral artery.  I have now spoken with Bambi of the stroke team.  She will evaluate the patient as well.  She is familiar with her because of call yesterday about imaging performed at [facility].  We are still awaiting [facility] records to arrive. [MS]   1720 I spoke with Tahira, stroke navigator.  We both agree that admission for further stroke work-up is indicated.  I have communicated with Dr. Segundo, hospitalist for admission. 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

## 2022-07-02 ENCOUNTER — APPOINTMENT (OUTPATIENT)
Dept: CARDIOLOGY | Facility: HOSPITAL | Age: 87
End: 2022-07-02

## 2022-07-02 ENCOUNTER — APPOINTMENT (OUTPATIENT)
Dept: MRI IMAGING | Facility: HOSPITAL | Age: 87
End: 2022-07-02

## 2022-07-02 LAB
BH CV ECHO MEAS - AI P1/2T: 351.7 MSEC
BH CV ECHO MEAS - AO MAX PG: 7.7 MMHG
BH CV ECHO MEAS - AO MEAN PG: 4 MMHG
BH CV ECHO MEAS - AO ROOT DIAM: 3 CM
BH CV ECHO MEAS - AO V2 MAX: 139 CM/SEC
BH CV ECHO MEAS - AO V2 VTI: 25.6 CM
BH CV ECHO MEAS - AVA(I,D): 2.24 CM2
BH CV ECHO MEAS - EDV(CUBED): 64 ML
BH CV ECHO MEAS - EDV(MOD-SP2): 35.2 ML
BH CV ECHO MEAS - EDV(MOD-SP4): 31.6 ML
BH CV ECHO MEAS - EF(MOD-BP): 57.6 %
BH CV ECHO MEAS - EF(MOD-SP2): 56.5 %
BH CV ECHO MEAS - EF(MOD-SP4): 57.9 %
BH CV ECHO MEAS - ESV(CUBED): 15.6 ML
BH CV ECHO MEAS - ESV(MOD-SP2): 15.3 ML
BH CV ECHO MEAS - ESV(MOD-SP4): 13.3 ML
BH CV ECHO MEAS - FS: 37.5 %
BH CV ECHO MEAS - IVS/LVPW: 1 CM
BH CV ECHO MEAS - IVSD: 1.2 CM
BH CV ECHO MEAS - LA DIMENSION: 3.9 CM
BH CV ECHO MEAS - LAT PEAK E' VEL: 5.6 CM/SEC
BH CV ECHO MEAS - LV MASS(C)D: 165.5 GRAMS
BH CV ECHO MEAS - LV MAX PG: 6.8 MMHG
BH CV ECHO MEAS - LV MEAN PG: 3 MMHG
BH CV ECHO MEAS - LV V1 MAX: 130 CM/SEC
BH CV ECHO MEAS - LV V1 VTI: 22.5 CM
BH CV ECHO MEAS - LVIDD: 4 CM
BH CV ECHO MEAS - LVIDS: 2.5 CM
BH CV ECHO MEAS - LVOT AREA: 2.5 CM2
BH CV ECHO MEAS - LVOT DIAM: 1.8 CM
BH CV ECHO MEAS - LVPWD: 1.2 CM
BH CV ECHO MEAS - MED PEAK E' VEL: 7 CM/SEC
BH CV ECHO MEAS - MV A MAX VEL: 104 CM/SEC
BH CV ECHO MEAS - MV DEC SLOPE: 406 CM/SEC2
BH CV ECHO MEAS - MV DEC TIME: 0.2 MSEC
BH CV ECHO MEAS - MV E MAX VEL: 73.9 CM/SEC
BH CV ECHO MEAS - MV E/A: 0.71
BH CV ECHO MEAS - MV MAX PG: 3.7 MMHG
BH CV ECHO MEAS - MV MEAN PG: 2 MMHG
BH CV ECHO MEAS - MV P1/2T: 54.8 MSEC
BH CV ECHO MEAS - MV V2 VTI: 21.4 CM
BH CV ECHO MEAS - MVA(P1/2T): 4 CM2
BH CV ECHO MEAS - MVA(VTI): 2.7 CM2
BH CV ECHO MEAS - PA ACC TIME: 0.1 SEC
BH CV ECHO MEAS - PA PR(ACCEL): 36.3 MMHG
BH CV ECHO MEAS - RAP SYSTOLE: 3 MMHG
BH CV ECHO MEAS - RVSP: 17 MMHG
BH CV ECHO MEAS - SV(LVOT): 57.3 ML
BH CV ECHO MEAS - SV(MOD-SP2): 19.9 ML
BH CV ECHO MEAS - SV(MOD-SP4): 18.3 ML
BH CV ECHO MEAS - TAPSE (>1.6): 1.8 CM
BH CV ECHO MEAS - TR MAX PG: 14.3 MMHG
BH CV ECHO MEAS - TR MAX VEL: 186.3 CM/SEC
BH CV ECHO MEASUREMENTS AVERAGE E/E' RATIO: 11.73
BH CV VAS BP RIGHT ARM: NORMAL MMHG
BH CV XLRA - RV BASE: 2.2 CM
BH CV XLRA - RV LENGTH: 6.1 CM
BH CV XLRA - RV MID: 1.5 CM
BH CV XLRA - TDI S': 18 CM/SEC
CHOLEST SERPL-MCNC: 242 MG/DL (ref 0–200)
GLUCOSE BLDC GLUCOMTR-MCNC: 126 MG/DL (ref 70–130)
HBA1C MFR BLD: 5.3 % (ref 4.8–5.6)
HDLC SERPL-MCNC: 41 MG/DL (ref 40–60)
LDLC SERPL CALC-MCNC: 162 MG/DL (ref 0–100)
LDLC/HDLC SERPL: 3.89 {RATIO}
LEFT ATRIUM VOLUME INDEX: 27.9 ML/M2
LV EF 2D ECHO EST: 55 %
MAXIMAL PREDICTED HEART RATE: 133 BPM
QT INTERVAL: 312 MS
QTC INTERVAL: 357 MS
STRESS TARGET HR: 113 BPM
TRIGL SERPL-MCNC: 208 MG/DL (ref 0–150)
VLDLC SERPL-MCNC: 39 MG/DL (ref 5–40)

## 2022-07-02 PROCEDURE — 99233 SBSQ HOSP IP/OBS HIGH 50: CPT | Performed by: STUDENT IN AN ORGANIZED HEALTH CARE EDUCATION/TRAINING PROGRAM

## 2022-07-02 PROCEDURE — 82962 GLUCOSE BLOOD TEST: CPT

## 2022-07-02 PROCEDURE — G0378 HOSPITAL OBSERVATION PER HR: HCPCS

## 2022-07-02 PROCEDURE — 97166 OT EVAL MOD COMPLEX 45 MIN: CPT

## 2022-07-02 PROCEDURE — 93306 TTE W/DOPPLER COMPLETE: CPT | Performed by: INTERNAL MEDICINE

## 2022-07-02 PROCEDURE — 93306 TTE W/DOPPLER COMPLETE: CPT

## 2022-07-02 PROCEDURE — 99232 SBSQ HOSP IP/OBS MODERATE 35: CPT | Performed by: INTERNAL MEDICINE

## 2022-07-02 PROCEDURE — 80061 LIPID PANEL: CPT

## 2022-07-02 PROCEDURE — 83036 HEMOGLOBIN GLYCOSYLATED A1C: CPT

## 2022-07-02 PROCEDURE — 70551 MRI BRAIN STEM W/O DYE: CPT

## 2022-07-02 PROCEDURE — 97162 PT EVAL MOD COMPLEX 30 MIN: CPT

## 2022-07-02 RX ORDER — SODIUM CHLORIDE 0.9 % (FLUSH) 0.9 %
10 SYRINGE (ML) INJECTION AS NEEDED
Status: DISCONTINUED | OUTPATIENT
Start: 2022-07-02 | End: 2022-07-06 | Stop reason: HOSPADM

## 2022-07-02 RX ORDER — ALPRAZOLAM 0.25 MG/1
0.25 TABLET ORAL EVERY 4 HOURS PRN
Status: DISCONTINUED | OUTPATIENT
Start: 2022-07-02 | End: 2022-07-06 | Stop reason: HOSPADM

## 2022-07-02 RX ORDER — ACETAMINOPHEN 650 MG/1
650 SUPPOSITORY RECTAL EVERY 4 HOURS PRN
Status: DISCONTINUED | OUTPATIENT
Start: 2022-07-02 | End: 2022-07-06 | Stop reason: HOSPADM

## 2022-07-02 RX ORDER — POLYETHYLENE GLYCOL 3350 17 G/17G
17 POWDER, FOR SOLUTION ORAL DAILY PRN
Status: DISCONTINUED | OUTPATIENT
Start: 2022-07-02 | End: 2022-07-06 | Stop reason: HOSPADM

## 2022-07-02 RX ORDER — ACETAMINOPHEN 325 MG/1
650 TABLET ORAL EVERY 4 HOURS PRN
Status: DISCONTINUED | OUTPATIENT
Start: 2022-07-02 | End: 2022-07-06 | Stop reason: HOSPADM

## 2022-07-02 RX ORDER — AMOXICILLIN 250 MG
2 CAPSULE ORAL 2 TIMES DAILY
Status: DISCONTINUED | OUTPATIENT
Start: 2022-07-02 | End: 2022-07-06 | Stop reason: HOSPADM

## 2022-07-02 RX ORDER — BISACODYL 10 MG
10 SUPPOSITORY, RECTAL RECTAL DAILY PRN
Status: DISCONTINUED | OUTPATIENT
Start: 2022-07-02 | End: 2022-07-06 | Stop reason: HOSPADM

## 2022-07-02 RX ORDER — ASPIRIN 325 MG
325 TABLET ORAL DAILY
Status: DISCONTINUED | OUTPATIENT
Start: 2022-07-02 | End: 2022-07-06 | Stop reason: HOSPADM

## 2022-07-02 RX ORDER — ACETAMINOPHEN 160 MG/5ML
650 SOLUTION ORAL EVERY 4 HOURS PRN
Status: DISCONTINUED | OUTPATIENT
Start: 2022-07-02 | End: 2022-07-06 | Stop reason: HOSPADM

## 2022-07-02 RX ORDER — SODIUM CHLORIDE 0.9 % (FLUSH) 0.9 %
10 SYRINGE (ML) INJECTION EVERY 12 HOURS SCHEDULED
Status: DISCONTINUED | OUTPATIENT
Start: 2022-07-02 | End: 2022-07-06 | Stop reason: HOSPADM

## 2022-07-02 RX ORDER — BISACODYL 5 MG/1
5 TABLET, DELAYED RELEASE ORAL DAILY PRN
Status: DISCONTINUED | OUTPATIENT
Start: 2022-07-02 | End: 2022-07-06 | Stop reason: HOSPADM

## 2022-07-02 RX ADMIN — Medication 10 ML: at 08:56

## 2022-07-02 RX ADMIN — ACETAMINOPHEN 650 MG: 325 TABLET ORAL at 04:30

## 2022-07-02 RX ADMIN — Medication 10 ML: at 20:10

## 2022-07-02 RX ADMIN — ALPRAZOLAM 0.25 MG: 0.25 TABLET ORAL at 04:30

## 2022-07-02 RX ADMIN — RIVAROXABAN 20 MG: 20 TABLET, FILM COATED ORAL at 17:26

## 2022-07-02 RX ADMIN — Medication 10 ML: at 08:53

## 2022-07-02 RX ADMIN — SENNOSIDES AND DOCUSATE SODIUM 2 TABLET: 50; 8.6 TABLET ORAL at 08:53

## 2022-07-02 RX ADMIN — ASPIRIN 325 MG ORAL TABLET 325 MG: 325 PILL ORAL at 17:26

## 2022-07-02 RX ADMIN — ATORVASTATIN CALCIUM 40 MG: 40 TABLET, FILM COATED ORAL at 20:09

## 2022-07-02 RX ADMIN — ASPIRIN 81 MG CHEWABLE TABLET 81 MG: 81 TABLET CHEWABLE at 08:53

## 2022-07-02 RX ADMIN — Medication 10 ML: at 20:09

## 2022-07-02 NOTE — CASE MANAGEMENT/SOCIAL WORK
"Discharge Planning Assessment  [facility]     Patient Name: Yolie Jacob  MRN: [id number]  Today's Date: 7/2/2022    Admit Date: 7/1/2022     Discharge Needs Assessment     Row Name 07/02/22 1600       Living Environment    People in Home child(amy), adult  pt resides in [facility]    Name(s) of People in Home hira Hampton    Current Living Arrangements home    Primary Care Provided by self    Provides Primary Care For no one    Family Caregiver if Needed child(amy), adult    Family Caregiver Names hira Jacob    Quality of Family Relationships helpful;involved;supportive    Able to Return to Prior Arrangements yes       Resource/Environmental Concerns    Resource/Environmental Concerns none    Transportation Concerns none       Transition Planning    Patient/Family Anticipates Transition to inpatient rehabilitation facility    Patient/Family Anticipated Services at Transition skilled nursing    Transportation Anticipated health plan transportation       Discharge Needs Assessment    Readmission Within the Last 30 Days no previous admission in last 30 days    Equipment Currently Used at Home commode;shower chair    Concerns to be Addressed discharge planning    Anticipated Changes Related to Illness none    Equipment Needed After Discharge none    Provided Post Acute Provider List? N/A    Provided Post Acute Provider Quality & Resource List? N/A               Discharge Plan     Row Name 07/02/22 1602       Plan    Plan home    Patient/Family in Agreement with Plan yes    Plan Comments CM spoke with pt at bedside. Pt resides in [facility] with her son Memo and is independent of adls. Pt's son assists with houskeeping and meals. Pt reports she was driving prior to admission however states \" I don't know that I should be driving any longer after this\". Pt has a shower chair and bedside commode. Pt is not current with home health or outpatient medical services. Pt has not received her covid " vaccinations.Pt has Medicare and [place] [employer],denies concerns or disruption in coverage. Pt has prescription drug coverage and denies issues obtaining or affording current medications. CM reviewed therapy recs for rehab, pt is agreeable and would like referrals to [facility] and [place]. CM will make referrals on Tuesday 7/5 when liaisons return following holiday. CM will continue to follow.    Final Discharge Disposition Code 03 - skilled nursing facility (SNF)              Continued Care and Services - Admitted Since 7/1/2022    Coordination has not been started for this encounter.          Demographic Summary     Row Name 07/02/22 1559       General Information    Admission Type observation    Required Notices Provided Observation Status Notice    Referral Source admission list    Reason for Consult discharge planning    Preferred Language English    General Information Comments PCP- Candice Armstrong       Contact Information    Permission Granted to Share Info With                Functional Status     Row Name 07/02/22 1559       Functional Status    Usual Activity Tolerance fair    Current Activity Tolerance poor       Functional Status, IADL    Medications independent    Meal Preparation assistive person    Housekeeping assistive person    Laundry assistive person    Shopping assistive person       Mental Status    General Appearance WDL WDL       Mental Status Summary    Recent Changes in Mental Status/Cognitive Functioning no changes       Employment/    Employment/ Comments Pt has Medicare and [employer] [place] [employer],denies concerns or disruption in coverage. Pt has prescription drug coverage and denies issues obtaining or affording current medications.               Psychosocial    No documentation.                Abuse/Neglect    No documentation.                Legal    No documentation.                Substance Abuse    No documentation.                Patient Forms    No  documentation.                   Ericka Mccarthy RN

## 2022-07-02 NOTE — PROGRESS NOTES
[facility] Medicine Services  PROGRESS NOTE    Patient Name: Yolie Jacob  : 1935  MRN: [id number]    Date of Admission: 2022  Primary Care Physician: Provider, No Known    Subjective   Subjective     CC:  CVA workup    HPI:  BP elevated overnight. Otherwise VSS . Patient eating breakfast. Nursing noted visual changes but patient endorses these have been present since admission and no changes per her report.     ROS:  Gen- No fevers, chills  CV- No chest pain, palpitations  Resp- No cough, dyspnea  GI- No N/V/D, abd pain  +visual changes with blurry vision        Objective   Objective     Vital Signs:   Temp:  [98.1 °F (36.7 °C)-98.6 °F (37 °C)] 98.2 °F (36.8 °C)  Heart Rate:  [70-97] 72  Resp:  [16-18] 16  BP: (147-183)/() 151/85     Physical Exam:  GEN: NAD, resting in bed, awake,eating breakfast  HEENT: on room air, atraumatic, normocephalic  NECK: supple, no masses  RESP: on room air, normal effort  CV: on tele, sinus rhythm  PSYCH: normal affect, appropriate  NEURO: awake, alert, no focal deficits noted  MSK: no edema noted  SKIN: no rashes noted       Results Reviewed:  LAB RESULTS:      Lab 22  1440   WBC 5.12   HEMOGLOBIN 15.3   HEMATOCRIT 44.2   PLATELETS 213   NEUTROS ABS 3.96   IMMATURE GRANS (ABS) 0.01   LYMPHS ABS 0.64*   MONOS ABS 0.43   EOS ABS 0.06   MCV 87.2         Lab 22  1440   SODIUM 141   POTASSIUM 3.7   CHLORIDE 107   CO2 24.0   ANION GAP 10.0   BUN 16   CREATININE 0.61   EGFR 86.7   GLUCOSE 102*   CALCIUM 9.1   MAGNESIUM 2.0   TSH 2.040         Lab 22  1440   TOTAL PROTEIN 6.4   ALBUMIN 4.20   GLOBULIN 2.2   ALT (SGPT) 14   AST (SGOT) 17   BILIRUBIN 0.6   ALK PHOS 102         Lab 22  1440   TROPONIN T 0.014             Lab 22  1440   FOLATE 19.70   VITAMIN B 12 267         Brief Urine Lab Results  (Last result in the past 365 days)      Color   Clarity   Blood   Leuk Est   Nitrite   Protein   CREAT   Urine HCG         07/01/22 1557 Yellow   Clear   Negative   Trace   Negative   Negative                 Microbiology Results Abnormal     Procedure Component Value - Date/Time    COVID PRE-OP / PRE-PROCEDURE SCREENING ORDER (NO ISOLATION) - Swab, Nasopharynx [[id number]]  (Normal) Collected: 07/01/22 1555    Lab Status: Final result Specimen: Swab from Nasopharynx Updated: 07/01/22 1617    Narrative:      The following orders were created for panel order COVID PRE-OP / PRE-PROCEDURE SCREENING ORDER (NO ISOLATION) - Swab, Nasopharynx.  Procedure                               Abnormality         Status                     ---------                               -----------         ------                     COVID-19 and FLU A/B PCR...[[id number]]  Normal              Final result                 Please view results for these tests on the individual orders.    COVID-19 and FLU A/B PCR - Swab, Nasopharynx [[id number]]  (Normal) Collected: 07/01/22 1555    Lab Status: Final result Specimen: Swab from Nasopharynx Updated: 07/01/22 1617     COVID19 Not Detected     Influenza A PCR Not Detected     Influenza B PCR Not Detected    Narrative:      Fact sheet for providers: https://www.fda.gov/media/410676/download    Fact sheet for patients: https://www.fda.gov/media/945906/download    Test performed by PCR.          CT Angiogram Neck    Result Date: 7/1/2022  CT ANGIOGRAM HEAD-, CT ANGIOGRAM NECK-  Date of Exam: 7/1/2022 3:33 PM  Indication: headache, h/o recently dxd aneurysm.  Comparison Exams: None available.  Technique:  Multiple axial images were obtained from the aortic arch through the vertex before and after the administration contrast for CT angiogram of the brain. Volumetric imaging was performed and multiplanar and 3-dimensional angiographic images were reformatted on an independent workstation. Automatic exposure intraoperative reconstruction methods were utilized to minimize radiation dose.   FINDINGS:  CTA NECK: There is mild  calcified plaque of the aortic arch.  Origins of the great vessels appear widely patent.  Right brachiocephalic artery is patent.  Visualized right subclavian artery is patent.  Right common carotid artery is patent without focal stenosis.  No significant plaque the right carotid bifurcation.  No significant stenosis of the right internal or external carotid arteries.  Left common carotid artery is patent without stenosis.  There is minimal calcified plaque the left carotid bifurcation.  No significant stenosis of the left internal or external carotid arteries.  The left subclavian artery appears patent.  Vertebral arteries are codominant and patent without definite stenosis.  Soft tissues of the neck appear within normal limits.  There is calcified granuloma within the medial right lung apex.  Visualized lung apices are otherwise clear.  There are degenerative changes of spine. There are no lytic or sclerotic bony lesions identified.  CTA HEAD: There is fusiform aneurysmal enlargement of the supraclinoid segment of the left internal carotid artery.  This measures up to 8 mm in maximum diameter.  The left middle cerebral artery and anterior cerebral arteries are of normal caliber.  There is irregularity of the left anterior cerebral artery consistent with intracranial atherosclerosis.  Right intracranial internal carotid artery is patent without focal stenosis or aneurysm.  Right anterior and middle cerebral arteries appear patent without focal stenosis.  Vertebral arteries are patent skull base.  Basilar artery is patent without focal stenosis or occlusion.  The bilateral superior cerebellar arteries appear patent.  There is fetal-type origin of the left posterior cerebral artery.  This arises from the aneurysmal segment of the left internal carotid artery.  There is irregularity of the distal branch of the left posterior cerebral artery compatible with intracranial atherosclerosis.  There is occlusion of the P2  segment of the right posterior cerebral artery.  No posterior circulation aneurysm identified.  There is no pathologic intracranial contrast enhancement.  Globes and orbits appear within normal limits.  Visualized paranasal sinuses and mastoid air cells appear clear.      Impression:  1.  No evidence of carotid or vertebral artery stenosis. 2.  Fusiform aneurysmal enlargement of the supraclinoid segment of the left internal carotid artery.  The left anterior cerebral and middle cerebral arteries are of normal caliber. 2.  There is mild irregularity of the left anterior cerebral artery and a distal branch of the left posterior cerebral artery consistent with intracranial atherosclerosis. 3.  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 Findings personally discussed with Dr. Riggins of the emergency department at 4:20 PM on 7/1/2022  This report was finalized on 7/1/2022 4:25 PM by Noman Varghese MD.      XR Chest 1 View    Result Date: 7/1/2022  DATE OF EXAM: 7/1/2022 2:04 PM  PROCEDURE: XR CHEST 1 VW-  INDICATIONS: Weak/Dizzy/AMS triage protocol  COMPARISON: No comparisons available.  TECHNIQUE: Single radiographic AP view of the chest was obtained.  FINDINGS: History indicates generalized weakness and dizziness. The heart shadow is in the upper range of normal size. Pulmonary vasculature appears normal. Lungs are well-expanded to mildly hyperinflated, and show a coarsened, mildly reticular interstitial disease pattern which may reflect mild senescent change, possibly changes of obstructive or reactive airways disease. There is a small area of linear scarring in the left lateral mid lung, but no other significant focal disease. No effusion or pneumothorax is seen.      Impression: Borderline heart shadow enlargement and mild chronic appearing interstitial changes. No clearly acute chest pathology is identified.  This report was finalized on 7/1/2022 2:20 PM by Dr. Mil Betancourt MD.      CT  Angiogram Head    Result Date: 7/1/2022  CT ANGIOGRAM HEAD-, CT ANGIOGRAM NECK-  Date of Exam: 7/1/2022 3:33 PM  Indication: headache, h/o recently dxd aneurysm.  Comparison Exams: None available.  Technique:  Multiple axial images were obtained from the aortic arch through the vertex before and after the administration contrast for CT angiogram of the brain. Volumetric imaging was performed and multiplanar and 3-dimensional angiographic images were reformatted on an independent workstation. Automatic exposure intraoperative reconstruction methods were utilized to minimize radiation dose.   FINDINGS:  CTA NECK: There is mild calcified plaque of the aortic arch.  Origins of the great vessels appear widely patent.  Right brachiocephalic artery is patent.  Visualized right subclavian artery is patent.  Right common carotid artery is patent without focal stenosis.  No significant plaque the right carotid bifurcation.  No significant stenosis of the right internal or external carotid arteries.  Left common carotid artery is patent without stenosis.  There is minimal calcified plaque the left carotid bifurcation.  No significant stenosis of the left internal or external carotid arteries.  The left subclavian artery appears patent.  Vertebral arteries are codominant and patent without definite stenosis.  Soft tissues of the neck appear within normal limits.  There is calcified granuloma within the medial right lung apex.  Visualized lung apices are otherwise clear.  There are degenerative changes of spine. There are no lytic or sclerotic bony lesions identified.  CTA HEAD: There is fusiform aneurysmal enlargement of the supraclinoid segment of the left internal carotid artery.  This measures up to 8 mm in maximum diameter.  The left middle cerebral artery and anterior cerebral arteries are of normal caliber.  There is irregularity of the left anterior cerebral artery consistent with intracranial atherosclerosis.  Right  intracranial internal carotid artery is patent without focal stenosis or aneurysm.  Right anterior and middle cerebral arteries appear patent without focal stenosis.  Vertebral arteries are patent skull base.  Basilar artery is patent without focal stenosis or occlusion.  The bilateral superior cerebellar arteries appear patent.  There is fetal-type origin of the left posterior cerebral artery.  This arises from the aneurysmal segment of the left internal carotid artery.  There is irregularity of the distal branch of the left posterior cerebral artery compatible with intracranial atherosclerosis.  There is occlusion of the P2 segment of the right posterior cerebral artery.  No posterior circulation aneurysm identified.  There is no pathologic intracranial contrast enhancement.  Globes and orbits appear within normal limits.  Visualized paranasal sinuses and mastoid air cells appear clear.      Impression:  1.  No evidence of carotid or vertebral artery stenosis. 2.  Fusiform aneurysmal enlargement of the supraclinoid segment of the left internal carotid artery.  The left anterior cerebral and middle cerebral arteries are of normal caliber. 2.  There is mild irregularity of the left anterior cerebral artery and a distal branch of the left posterior cerebral artery consistent with intracranial atherosclerosis. 3.  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 Findings personally discussed with Dr. Riggins of the emergency department at 4:20 PM on 7/1/2022  This report was finalized on 7/1/2022 4:25 PM by Noman Varghese MD.            I have reviewed the medications:  Scheduled Meds:aspirin, 81 mg, Oral, Daily   Or  aspirin, 300 mg, Rectal, Daily  atorvastatin, 40 mg, Oral, Nightly  senna-docusate sodium, 2 tablet, Oral, BID  sodium chloride, 10 mL, Intravenous, Q12H  sodium chloride, 10 mL, Intravenous, Q12H      Continuous Infusions:   PRN Meds:.•  acetaminophen **OR** acetaminophen **OR**  acetaminophen  •  ALPRAZolam  •  senna-docusate sodium **AND** polyethylene glycol **AND** bisacodyl **AND** bisacodyl  •  sodium chloride  •  sodium chloride  •  sodium chloride    Assessment & Plan   Assessment & Plan     Active Hospital Problems    Diagnosis  POA   • Visual disturbance [H53.9]  Yes   • Ataxia [R27.0]  Unknown   • Paresthesia and pain of left extremity [M79.609, R20.2]  Unknown   • Headache [R51.9]  Unknown   • A-fib (HCC) [I48.91]  Unknown      Resolved Hospital Problems   No resolved problems to display.        Brief Hospital Course to date:    Yolie Jacob is a 87 y.o. female PMH afib (eliquis), hx of COVID (08/2021) presenting with ataxia, headache, dizziness, blurry vision, decreased sensation to the left arm and left leg.     This patient's problems and plans were partially entered by my partner and updated as appropriate by me 07/02/22.         Patient with disturbance, headache, ataxia, left-sided paresthesia (rule out CVA)  Right sided p2 (of right pca) occlusion on CT Angiogram  LICA Supraclinoid aneurysm 9 mm  -patient reports recently taking eliquis 2.5mg once daily instead of twice daily,? Possibly contributed to tia vs cva.  - Recently discharged from [facility] reporting aneurysm.  - CTA head and neck showed possible right P2 occlusion unsure if it is acute or chronic  - MRI brain without contrast is done with noted restricted diffusion in the territory of R PCA. T2 changes in both hemispheres which appear to be more small vessel change.   - Aspirin 81 mg daily, atorvastatin 40 mg at bedtime.  - A1c, lipid panel, TTE; further stroke workup/orders per neuro service  - PT/OT/SLP     Atrial fibrillation, rate controlled  - Patient reports taking Eliquis 2.5 mg daily prior to symptoms (instead of her prescribed 2.5mg bid). More recently had been taken off eliquis altogether since her recent admission at [facility] a few days ago.         HLD  - Atorvastatin 40 mg daily.  Lipid  panel noted        Expected Discharge Location and Transportation: ? rehab  Expected Discharge Date: 7/5    DVT prophylaxis:  Mechanical DVT prophylaxis orders are present.          CODE STATUS:   Code Status and Medical Interventions:   Ordered at: 07/01/22 1821     Level Of Support Discussed With:    Patient     Code Status (Patient has no pulse and is not breathing):    CPR (Attempt to Resuscitate)     Medical Interventions (Patient has pulse or is breathing):    Full Support       Kamilah Hdez MD  07/02/22

## 2022-07-02 NOTE — PLAN OF CARE
Problem: Adult Inpatient Plan of Care  Goal: Plan of Care Review  Recent Flowsheet Documentation  Taken 7/2/2022 1115 by Rex Thomas OT  Plan of Care Reviewed With:   patient   family  Outcome Evaluation:   VSS   Pt presents with fxl decline from PLOF, deficits in ADL performance, fxl mobility, occupational endurance. Pt limited by visual disturbance, L sided weakness, LUE mild deficits in FMC/GMC, decreased balance. Pt required Mod A sup>sit, Min A x 2 STS, Min A x 2/RW to ambulate in room, Min A UBD, set up/SBA grooming. Pt will benefit from skilled OT services to address deficits, facilitate increased fxl I. Recommend IRF at discharge.   Goal Outcome Evaluation:  Plan of Care Reviewed With: patient, family           Outcome Evaluation: VSS; Pt presents with fxl decline from PLOF, deficits in ADL performance, fxl mobility, occupational endurance. Pt limited by visual disturbance, L sided weakness, LUE mild deficits in FMC/GMC, decreased balance. Pt required Mod A sup>sit, Min A x 2 STS, Min A x 2/RW to ambulate in room, Min A UBD, set up/SBA grooming. Pt will benefit from skilled OT services to address deficits, facilitate increased fxl I. Recommend IRF at discharge.

## 2022-07-02 NOTE — PLAN OF CARE
Goal Outcome Evaluation:  Plan of Care Reviewed With: patient, family           Outcome Evaluation: PT PRESENTS WITH EVOLVING SYMPTOMS TO INCLUDE IMPAIRED BALANCE, L SIDED WEAKNESS, VISUAL DEFICITS, IMPAIRED COORDINATION AND DECLINE IN FUNCTIONAL MOBILITY. PT REQUIRED MOD ASSIST FOR SUPINE TO SIT, MIN ASSIST FOR STS AND MOD ASSIST TO AMBULATE X 14 FEET WITH R WALKER. RECOMMEND IRF AT D/C.

## 2022-07-02 NOTE — PLAN OF CARE
Problem: Pain Acute  Goal: Acceptable Pain Control and Functional Ability  Outcome: Ongoing, Progressing  Intervention: Prevent or Manage Pain  Flowsheets  Taken 7/2/2022 0715  Sensory Stimulation Regulation:   care clustered   quiet environment promoted  Bowel Elimination Promotion: adequate fluid intake promoted  Sleep/Rest Enhancement:   noise level reduced   room darkened   relaxation techniques promoted  Medication Review/Management: medications reviewed  Taken 7/2/2022 0004  Medication Review/Management: medications reviewed  Taken 7/1/2022 2223  Medication Review/Management: medications reviewed  Intervention: Develop Pain Management Plan  Flowsheets  Taken 7/2/2022 0410  Pain Management Interventions:   pillow support provided   position adjusted   see MAR  Taken 7/2/2022 0004  Pain Management Interventions:   pillow support provided   position adjusted  Taken 7/1/2022 2223  Pain Management Interventions:   no interventions per patient request   pillow support provided   position adjusted  Intervention: Optimize Psychosocial Wellbeing  Flowsheets  Taken 7/2/2022 0715  Supportive Measures:   active listening utilized   self-responsibility promoted   problem-solving facilitated   positive reinforcement provided   verbalization of feelings encouraged  Spiritual Activities Assistance: hope instilled  Taken 7/1/2022 2223  Diversional Activities: television     Problem: Adult Inpatient Plan of Care  Goal: Absence of Hospital-Acquired Illness or Injury  Intervention: Identify and Manage Fall Risk  Recent Flowsheet Documentation  Taken 7/2/2022 0004 by Amanda Mendoza, RN  Safety Promotion/Fall Prevention:   nonskid shoes/slippers when out of bed   clutter free environment maintained   safety round/check completed  Taken 7/1/2022 2223 by Amanda Mendoza, RN  Safety Promotion/Fall Prevention:   activity supervised   assistive device/personal items within reach   clutter free environment maintained   fall prevention  program maintained   lighting adjusted   gait belt   muscle strengthening facilitated   nonskid shoes/slippers when out of bed   room organization consistent   safety round/check completed  Intervention: Prevent Skin Injury  Recent Flowsheet Documentation  Taken 7/1/2022 2223 by Amanda Mendoza RN  Body Position: position changed independently  Intervention: Prevent and Manage VTE (Venous Thromboembolism) Risk  Recent Flowsheet Documentation  Taken 7/2/2022 0004 by Amanda Mendoza RN  Activity Management:   activity adjusted per tolerance   dorsiflexion/plantar flexion performed  Range of Motion: active ROM (range of motion) encouraged  Taken 7/1/2022 2223 by Amanda Mendoza RN  Activity Management:   activity adjusted per tolerance   dorsiflexion/plantar flexion performed   ambulated to bathroom  VTE Prevention/Management: dorsiflexion/plantar flexion performed  Range of Motion: ROM (range of motion) performed  Intervention: Prevent Infection  Recent Flowsheet Documentation  Taken 7/2/2022 0004 by Amanda Mendoza RN  Infection Prevention:   environmental surveillance performed   hand hygiene promoted   rest/sleep promoted  Taken 7/1/2022 2223 by Amanda Mendoza RN  Infection Prevention:   environmental surveillance performed   hand hygiene promoted   rest/sleep promoted  Goal: Optimal Comfort and Wellbeing  Intervention: Monitor Pain and Promote Comfort  Recent Flowsheet Documentation  Taken 7/2/2022 0410 by Amanda Mendoza RN  Pain Management Interventions:   pillow support provided   position adjusted   see MAR  Taken 7/2/2022 0004 by Amanda Mendoza RN  Pain Management Interventions:   pillow support provided   position adjusted  Taken 7/1/2022 2223 by Amanda Mendoza RN  Pain Management Interventions:   no interventions per patient request   pillow support provided   position adjusted  Intervention: Provide Person-Centered Care  Recent Flowsheet Documentation  Taken 7/1/2022 2223 by Amanda Mendoza  RN  Trust Relationship/Rapport:   care explained   choices provided   emotional support provided   questions answered   reassurance provided   empathic listening provided   questions encouraged   thoughts/feelings acknowledged   Goal Outcome Evaluation:

## 2022-07-02 NOTE — THERAPY EVALUATION
Patient Name: Yolie Jacob  : 1935    MRN: [id number]                              Today's Date: 2022       Admit Date: 2022    Visit Dx:     ICD-10-CM ICD-9-CM   1. Visual disturbance  H53.9 368.9   2. 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  I66.21 434.90     Patient Active Problem List   Diagnosis   • Visual disturbance   • Ataxia   • Paresthesia and pain of left extremity   • Headache   • A-fib (HCC)     Past Medical History:   Diagnosis Date   • A-fib (HCC)    • Arthritis    • Hypertension      Past Surgical History:   Procedure Laterality Date   • APPENDECTOMY     • BREAST SURGERY     • CATARACT EXTRACTION     •  SECTION     • EYE SURGERY     • HEMORRHOIDECTOMY     • INTRAOCULAR LENS INSERTION     • TONSILLECTOMY        General Information     Row Name 22 1115          OT Time and Intention    Document Type evaluation  -TA     Mode of Treatment occupational therapy  -TA     Row Name 22 1115          General Information    Patient Profile Reviewed yes  -TA     Prior Level of Function independent:;all household mobility;community mobility;gait;transfer;bed mobility;ADL's;driving;cooking;home management  -TA     Existing Precautions/Restrictions fall  -TA     Barriers to Rehab medically complex;visual deficit  -TA     Row Name 22 1115          Occupational Profile    Reason for Services/Referral (Occupational Profile) fxl decline from PLOF  -TA     Patient Goals (Occupational Profile) Rehab  -TA     Row Name 22 1115          Living Environment    People in Home child(amy), adult  -TA     Row Name 22 1115          Home Main Entrance    Number of Stairs, Main Entrance none  -TA     Row Name 22 1115          Stairs Within Home, Primary    Number of Stairs, Within Home, Primary twelve  to bedroom  -TA     Row Name 22 1115          Cognition    Orientation Status (Cognition) oriented to;person;place;situation;time  mild processing  delay  -TA     Row Name 07/02/22 1115          Safety Issues, Functional Mobility    Safety Issues Affecting Function (Mobility) safety precautions follow-through/compliance;positioning of assistive device  -TA     Impairments Affecting Function (Mobility) balance;cognition;coordination;endurance/activity tolerance;strength;sensation/sensory awareness  -TA     Cognitive Impairments, Mobility Safety/Performance safety precaution awareness;safety precaution follow-through  -TA           User Key  (r) = Recorded By, (t) = Taken By, (c) = Cosigned By    Initials Name Provider Type    TA Rex Thomas OT Occupational Therapist                 Mobility/ADL's     Row Name 07/02/22 1115          Bed Mobility    Bed Mobility supine-sit  -TA     Supine-Sit [place] (Bed Mobility) moderate assist (50% patient effort);verbal cues  -TA     Bed Mobility, Safety Issues decreased use of legs for bridging/pushing;decreased use of arms for pushing/pulling  -TA     Assistive Device (Bed Mobility) head of bed elevated;bed rails  -TA     Row Name 07/02/22 1115          Transfers    Transfers sit-stand transfer  -TA     Sit-Stand [place] (Transfers) minimum assist (75% patient effort);2 person assist;verbal cues  -TA     Row Name 07/02/22 1115          Sit-Stand Transfer    Assistive Device (Sit-Stand Transfers) walker, front-wheeled  -TA     Row Name 07/02/22 1115          Functional Mobility    Functional Mobility- Ind. Level minimum assist (75% patient effort);2 person assist required;verbal cues required  -TA     Functional Mobility- Device walker, front-wheeled  -TA     Functional Mobility-Distance (Feet) --  in room ambulation  -TA     Functional Mobility- Safety Issues balance decreased during turns;step length decreased;weight-shifting ability decreased  -TA     Functional Mobility- Comment VC/TC for RW mgt, keeping RW close with step to chair prior to sit  -TA     Row Name 07/02/22 1115          Activities of  Daily Living    BADL Assessment/Intervention upper body dressing;grooming  -TA     Row Name 07/02/22 1115          Upper Body Dressing Assessment/Training    [place] Level (Upper Body Dressing) don;pajama/robe;minimum assist (75% patient effort)  -TA     Position (Upper Body Dressing) edge of bed sitting  -TA     [facility] Name 07/02/22 1115          Grooming Assessment/Training    [place] Level (Grooming) wash face, hands;hair care, combing/brushing;set up;supervision  -TA     Position (Grooming) supported sitting  -TA           User Key  (r) = Recorded By, (t) = Taken By, (c) = Cosigned By    Initials Name Provider Type    Rex Hernandez, OT Occupational Therapist               Obj/Interventions     [facility] Name 07/02/22 1115          Sensory Assessment (Somatosensory)    Sensory Assessment (Somatosensory) right UE;sensation intact;left UE  -TA     Left UE Sensory Assessment light touch awareness;impaired  -TA     Sensory Subjective Reports numbness  -TA     [facility] Name 07/02/22 1115          Vision Assessment/Intervention    Visual Impairment/Limitations blurry vision  -TA     Visual Perception Impairment visual discrimination  -TA     [facility] Name 07/02/22 1115          Range of Motion Comprehensive    General Range of Motion bilateral upper extremity ROM WFL  -TA     [facility] Name 07/02/22 [facility]          Strength Comprehensive (MMT)    General Manual Muscle Testing (MMT) Assessment upper extremity strength deficits identified  -TA     Comment, General Manual Muscle Testing (MMT) Assessment RUE 4+/5, LUE 4-/5  -TA     [facility] Name 07/02/22 1115          Motor Skills    Motor Skills coordination  -TA     Coordination fine motor deficit;left;upper extremity;gross motor deficit;finger to nose  [id number]  -TA     [facility] Name 07/02/22 1115          Balance    Balance Assessment sitting dynamic balance;standing dynamic balance  -TA     Dynamic Sitting Balance standby assist  -TA     Position, Sitting Balance sitting edge of  bed  -TA     Dynamic Standing Balance minimal assist;verbal cues  -TA     Position/Device Used, Standing Balance walker, front-wheeled  -TA     Balance Interventions sit to stand;occupation based/functional task;weight shifting activity;vision occluded activity  -TA           User Key  (r) = Recorded By, (t) = Taken By, (c) = Cosigned By    Initials Name Provider Type    Rex Hernandez, OT Occupational Therapist               Goals/Plan     Row Name 07/02/22 1115          Transfer Goal 1 (OT)    Activity/Assistive Device (Transfer Goal 1, OT) sit-to-stand/stand-to-sit;bed-to-chair/chair-to-bed;toilet;walker, rolling  -TA     [place] Level/Cues Needed (Transfer Goal 1, OT) standby assist  -TA     Time Frame (Transfer Goal 1, OT) by discharge  -TA     Progress/Outcome (Transfer Goal 1, OT) goal ongoing  -TA     Row Name 07/02/22 1115          Dressing Goal 1 (OT)    Activity/Device (Dressing Goal 1, OT) lower body dressing  -TA     [place]/Cues Needed (Dressing Goal 1, OT) minimum assist (75% or more patient effort)  -TA     Time Frame (Dressing Goal 1, OT) by discharge  -TA     Progress/Outcome (Dressing Goal 1, OT) goal ongoing  -TA     Row Name 07/02/22 1115          Grooming Goal 1 (OT)    Activity/Device (Grooming Goal 1, OT) wash face, hands;oral care;hair care  in standing sink side  -TA     [place] (Grooming Goal 1, OT) standby assist  -TA     Time Frame (Grooming Goal 1, OT) by discharge  -TA     Progress/Outcome (Grooming Goal 1, OT) goal ongoing  -TA     Row Name 07/02/22 1115          Strength Goal 1 (OT)    Strength Goal 1 (OT) Pt will increase BUE MMS by 1/2 MMG to support fxl I with ADLs  -TA     Time Frame (Strength Goal 1, OT) by discharge  -TA     Progress/Outcome (Strength Goal 1, OT) goal ongoing  -TA     Row Name 07/02/22 1115          Therapy Assessment/Plan (OT)    Planned Therapy Interventions (OT) activity tolerance training;BADL retraining;functional balance  retraining;neuromuscular control/coordination retraining;occupation/activity based interventions;patient/caregiver education/training;ROM/therapeutic exercise;cognitive/visual perception retraining;strengthening exercise;transfer/mobility retraining  -TA           User Key  (r) = Recorded By, (t) = Taken By, (c) = Cosigned By    Initials Name Provider Type    Rex Hernandez, OT Occupational Therapist               Clinical Impression     [facility] Name 07/02/22 1115          Pain Assessment    Additional Documentation Pain Scale: FACES Pre/Post-Treatment (Group)  -TA     [facility] Name 07/02/22 1115          Pain Scale: FACES Pre/Post-Treatment    Pain: FACES Scale, Pretreatment 2-->hurts little bit  pt reported mild joint pain from lack of movement in bed, i,proved with fxl mobility  -TA     Posttreatment Pain Rating 0-->no hurt  -TA     Pain Location lower  -TA     Pain Location - back  -TA     Pre/Posttreatment Pain Comment Pt tolerated  -TA     Row Name 07/02/22 1115          Plan of Care Review    Plan of Care Reviewed With patient;family  -TA     Outcome Evaluation VSS; Pt presents with fxl decline from PLOF, deficits in ADL performance, fxl mobility, occupational endurance. Pt limited by visual disturbance, L sided weakness, LUE mild deficits in FMC/GMC, decreased balance. Pt required Mod A sup>sit, Min A x 2 STS, Min A x 2/RW to ambulate in room, Min A UBD, set up/SBA grooming. Pt will benefit from skilled OT services to address deficits, facilitate increased fxl I. Recommend IRF at discharge.  -TA     [facility] Name 07/02/22 1115          Therapy Assessment/Plan (OT)    Patient/Family Therapy Goal Statement (OT) Rehab  -TA     Rehab Potential (OT) good, to achieve stated therapy goals  -TA     Criteria for Skilled Therapeutic Interventions Met (OT) yes;skilled treatment is necessary  -TA     Therapy Frequency (OT) daily  -TA     Predicted Duration of Therapy Intervention (OT) 1 week  -TA     [facility] Name 07/02/22 111           Therapy Plan Review/Discharge Plan (OT)    Anticipated Discharge Disposition (OT) inpatient rehabilitation facility  -TA     Row Name 07/02/22 1115          Vital Signs    Pre Systolic BP Rehab 121  -TA     Pre Treatment Diastolic BP 72  -TA     Post Systolic BP Rehab 136  -TA     Post Treatment Diastolic BP 90  -TA     O2 Delivery Pre Treatment room air  -TA     O2 Delivery Intra Treatment room air  -TA     O2 Delivery Post Treatment room air  -TA     Pre Patient Position Supine  -TA     Intra Patient Position Standing  -TA     Post Patient Position Sitting  -TA     Row Name 07/02/22 1115          Positioning and Restraints    Pre-Treatment Position in bed  -TA     Post Treatment Position chair  -TA     In Chair notified nsg;reclined;call light within reach;encouraged to call for assist;exit alarm on;with family/caregiver;waffle cushion;LUE elevated;legs elevated  -TA           User Key  (r) = Recorded By, (t) = Taken By, (c) = Cosigned By    Initials Name Provider Type    Rex Hernandez OT Occupational Therapist               Outcome Measures     Row Name 07/02/22 1115          How much help from another is currently needed...    Putting on and taking off regular lower body clothing? 2  -TA     Bathing (including washing, rinsing, and drying) 2  -TA     Toileting (which includes using toilet bed pan or urinal) 2  -TA     Putting on and taking off regular upper body clothing 3  -TA     Taking care of personal grooming (such as brushing teeth) 3  -TA     Eating meals 4  -TA     AM-PAC 6 Clicks Score (OT) 16  -TA     Row Name 07/02/22 1115          Functional Assessment    Outcome Measure Options AM-PAC 6 Clicks Daily Activity (OT)  -TA           User Key  (r) = Recorded By, (t) = Taken By, (c) = Cosigned By    Initials Name Provider Type    Rex Hernandez OT Occupational Therapist                Occupational Therapy Education                 Title: PT OT SLP Therapies (Done)     Topic:  Occupational Therapy (Done)     Point: ADL training (Done)     Description:   Instruct learner(s) on proper safety adaptation and remediation techniques during self care or transfers.   Instruct in proper use of assistive devices.              Learning Progress Summary           Patient Acceptance, E, VU,NR by TA at 7/2/2022 1319                   Point: Home exercise program (Done)     Description:   Instruct learner(s) on appropriate technique for monitoring, assisting and/or progressing therapeutic exercises/activities.              Learning Progress Summary           Patient Acceptance, E, VU,NR by TA at 7/2/2022 1319                   Point: Precautions (Done)     Description:   Instruct learner(s) on prescribed precautions during self-care and functional transfers.              Learning Progress Summary           Patient Acceptance, E, VU,NR by TA at 7/2/2022 1319                   Point: Body mechanics (Done)     Description:   Instruct learner(s) on proper positioning and spine alignment during self-care, functional mobility activities and/or exercises.              Learning Progress Summary           Patient Acceptance, E, VU,NR by TA at 7/2/2022 1319                               User Key     Initials Effective Dates Name Provider Type Discipline    [facility] 06/16/21 -  Rex Thomas, OT Occupational Therapist OT              OT Recommendation and Plan  Planned Therapy Interventions (OT): activity tolerance training, BADL retraining, functional balance retraining, neuromuscular control/coordination retraining, occupation/activity based interventions, patient/caregiver education/training, ROM/therapeutic exercise, cognitive/visual perception retraining, strengthening exercise, transfer/mobility retraining  Therapy Frequency (OT): daily  Plan of Care Review  Plan of Care Reviewed With: patient, family  Outcome Evaluation: VSS; Pt presents with fxl decline from PLOF, deficits in ADL performance, fxl mobility,  occupational endurance. Pt limited by visual disturbance, L sided weakness, LUE mild deficits in FMC/GMC, decreased balance. Pt required Mod A sup>sit, Min A x 2 STS, Min A x 2/RW to ambulate in room, Min A UBD, set up/SBA grooming. Pt will benefit from skilled OT services to address deficits, facilitate increased fxl I. Recommend IRF at discharge.     Time Calculation:    Time Calculation- OT     Row Name 07/02/22 1115             Time Calculation- OT    OT Start Time 1115  ttc 0 minutes  -TA      Total Timed Code Minutes- OT 0 minute(s)  -TA      OT Received On 07/02/22  -TA      OT Goal Re-Cert Due Date 07/12/22  -TA              Untimed Charges    OT Eval/Re-eval Minutes 49  -TA              Total Minutes    Untimed Charges Total Minutes 49  -TA       Total Minutes 49  -TA            User Key  (r) = Recorded By, (t) = Taken By, (c) = Cosigned By    Initials Name Provider Type    TA Rex Thomas, OT Occupational Therapist              Therapy Charges for Today     Code Description Service Date Service Provider Modifiers Qty    [id number] HC OT EVAL MOD COMPLEXITY 4 7/2/2022 Rex Thomas OT GO 1               Rex Thomas OT  7/2/2022

## 2022-07-02 NOTE — PROGRESS NOTES
Stroke Progress Note       Chief Complaint:  Left visual field loss.     Subjective    Subjective     Subjective:  No acute events overnight       Review of Systems   Constitutional: No fatigue  HENT: Negative for nosebleeds and rhinorrhea.    Eyes: Negative for redness.   Respiratory: Negative for cough.    Gastrointestinal: Negative for anal bleeding.   Endocrine: Negative for polydipsia.   Genitourinary: Negative for enuresis and urgency.   Musculoskeletal: Negative for joint swelling.   Neurological: Negative for tremors.   Psychiatric/Behavioral: Negative for hallucinations.     Objective      Temp:  [98.1 °F (36.7 °C)-98.6 °F (37 °C)] 98.1 °F (36.7 °C)  Heart Rate:  [70-97] 75  Resp:  [16-18] 16  BP: (121-181)/() 121/73      NEURO    MENTAL STATUS: AAOx3, memory intact, fund of knowledge appropriate    LANG/SPEECH: Naming and repetition intact, fluent, follows 3-step commands    CRANIAL NERVES:  LHH   Pupils equal and reactive, EOMI intact, dysconjugate gaze, no nystagmus  No facial droop, cough and gag intact, shoulder shrug intact, tongue midline     MOTOR:  Moves all extremities equally    SENSORY: Normal to light touch all throughout     COORDINATION: Normal finger to nose and heel to shin, no tremor, no dysmetria    STATION: Not assessed due to patient condition    GAIT: Not assessed due to patient condition  Results Review:    I reviewed the patient's new clinical results.    Lab Results (last 24 hours)     Procedure Component Value Units Date/Time    Hemoglobin A1c [[id number]]  (Normal) Collected: 07/02/22 0726    Specimen: Blood Updated: 07/02/22 0916     Hemoglobin A1C 5.30 %     Narrative:      Hemoglobin A1C Ranges:    Increased Risk for Diabetes  5.7% to 6.4%  Diabetes                     >= 6.5%  Diabetic Goal                < 7.0%    Lipid Panel [[id number]]  (Abnormal) Collected: 07/02/22 0726    Specimen: Blood Updated: 07/02/22 0854     Total Cholesterol 242 mg/dL      Triglycerides 208  mg/dL      HDL Cholesterol 41 mg/dL      LDL Cholesterol  162 mg/dL      VLDL Cholesterol 39 mg/dL      LDL/HDL Ratio 3.89    Narrative:      Cholesterol Reference Ranges  (U.S. Department of Health and Human Services ATP III Classifications)    Desirable          <200 mg/dL  Borderline High    200-239 mg/dL  High Risk          >240 mg/dL      Triglyceride Reference Ranges  (U.S. Department of Health and Human Services ATP III Classifications)    Normal           <150 mg/dL  Borderline High  150-199 mg/dL  High             200-499 mg/dL  Very High        >500 mg/dL    HDL Reference Ranges  (U.S. Department of Health and Human Services ATP III Classifications)    Low     <40 mg/dl (major risk factor for CHD)  High    >60 mg/dl ('negative' risk factor for CHD)        LDL Reference Ranges  (U.S. Department of Health and Human Services ATP III Classifications)    Optimal          <100 mg/dL  Near Optimal     100-129 mg/dL  Borderline High  130-159 mg/dL  High             160-189 mg/dL  Very High        >189 mg/dL    POC Glucose Once [[id number]]  (Normal) Collected: 07/02/22 0005    Specimen: Blood Updated: 07/02/22 0005     Glucose 126 mg/dL      Comment: Meter: [id number] : [id number] López Hernandez       Vitamin B12 [[id number]]  (Normal) Collected: 07/01/22 1440    Specimen: Blood Updated: 07/01/22 2307     Vitamin B-12 267 pg/mL     Narrative:      Results may be falsely increased if patient taking Biotin.      Folate [[id number]]  (Normal) Collected: 07/01/22 1440    Specimen: Blood Updated: 07/01/22 2307     Folate 19.70 ng/mL     Narrative:      Results may be falsely increased if patient taking Biotin.      [place] Draw [[id number]] Collected: 07/01/22 1440    Specimen: Blood Updated: 07/01/22 1847    Narrative:      The following orders were created for panel order [place] Draw.  Procedure                               Abnormality         Status                     ---------                                -----------         ------                     Green Top (Gel)[[id number]]                                  Final result               Lavender Top[[id number]]                                     Final result               Gold Top - SST[[id number]]                                   Final result               Chan Top[[id number]]                                         Final result               Light Blue Top[[id number]]                                   Final result                 Please view results for these tests on the individual orders.    Chan Top [[id number]] Collected: 07/01/22 1440    Specimen: Blood Updated: 07/01/22 1847     Extra Tube Hold for add-ons.     Comment: Auto resulted.       TSH [[id number]]  (Normal) Collected: 07/01/22 1440    Specimen: Blood Updated: 07/01/22 1844     TSH 2.040 uIU/mL     Urinalysis With Microscopic If Indicated (No Culture) - Urine, Clean Catch [[id number]]  (Abnormal) Collected: 07/01/22 1557    Specimen: Urine, Clean Catch Updated: 07/01/22 1622     Color, UA Yellow     Appearance, UA Clear     pH, UA 7.5     Specific Gravity, UA 1.042     Glucose, UA Negative     Ketones, UA Negative     Bilirubin, UA Negative     Blood, UA Negative     Protein, UA Negative     Leuk Esterase, UA Trace     Nitrite, UA Negative     Urobilinogen, UA 0.2 E.U./dL    Urinalysis, Microscopic Only - Urine, Clean Catch [[id number]]  (Abnormal) Collected: 07/01/22 1557    Specimen: Urine, Clean Catch Updated: 07/01/22 1622     RBC, UA 0-2 /HPF      WBC, UA 3-5 /HPF      Bacteria, UA None Seen /HPF      Squamous Epithelial Cells, UA 0-2 /HPF      Hyaline Casts, UA 0-6 /LPF      Methodology Automated Microscopy        CT Angiogram Neck    Result Date: 7/1/2022   1.  No evidence of carotid or vertebral artery stenosis. 2.  Fusiform aneurysmal enlargement of the supraclinoid segment of the left internal carotid artery.  The left anterior cerebral and middle cerebral arteries are of normal caliber. 2.   There is mild irregularity of the left anterior cerebral artery and a distal branch of the left posterior cerebral artery consistent with intracranial atherosclerosis. 3.  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 Findings personally discussed with Dr. Riggins of the emergency department at 4:20 PM on 7/1/2022  This report was finalized on 7/1/2022 4:25 PM by Noman Varghese MD.      MRI Brain Without Contrast    Result Date: 7/2/2022  1.  Restricted diffusion in the territory of the right posterior cerebral artery. 2.  T2 signal changes involving both cerebral hemispheres occur reflect more chronic small vessel ischemic change. 3.  Susceptibility artifact within the thalamic areas and cerebellum that could be seen with hypertension. There additionally some susceptibility artifact in the right frontal parietal area. This also may relate to hypertension or could be seen with amyloid angiopathy.  This report was finalized on 7/2/2022 8:23 AM by Kailash Parker MD.      XR Chest 1 View    Result Date: 7/1/2022  Borderline heart shadow enlargement and mild chronic appearing interstitial changes. No clearly acute chest pathology is identified.  This report was finalized on 7/1/2022 2:20 PM by Dr. Mil Betancourt MD.      CT Angiogram Head    Result Date: 7/1/2022   1.  No evidence of carotid or vertebral artery stenosis. 2.  Fusiform aneurysmal enlargement of the supraclinoid segment of the left internal carotid artery.  The left anterior cerebral and middle cerebral arteries are of normal caliber. 2.  There is mild irregularity of the left anterior cerebral artery and a distal branch of the left posterior cerebral artery consistent with intracranial atherosclerosis. 3.  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 Findings personally discussed with Dr. Riggins of the emergency department at 4:20 PM on 7/1/2022  This report was finalized on 7/1/2022 4:25 PM by Noman Varghese MD.                 Assessment/Plan     Assessment/Plan:        Acute ischemic stroke, right posterior cerebral artery, likely etiology artery to artery/ cardioembolic  -CTA evidence right P2 occlusion, left M1 stenosis.   -Patient is not compliant with medications-forgets to take her morning doses.  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     Atrial fibrillation-switched to Xarelto full dose - to ensure compliance    Essential hypertension-normal blood pressure goals    Mixed hyperlipidemia-patient , goal LDL is less than 70      Neurology will continue to follow the patient.    Ulises Kay MD  07/02/22  16:20 EDT

## 2022-07-02 NOTE — THERAPY EVALUATION
Patient Name: Yolie Jacob  : 1935    MRN: [id number]                              Today's Date: 2022       Admit Date: 2022    Visit Dx:     ICD-10-CM ICD-9-CM   1. Visual disturbance  H53.9 368.9   2. 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  I66.21 434.90     Patient Active Problem List   Diagnosis   • Visual disturbance   • Ataxia   • Paresthesia and pain of left extremity   • Headache   • A-fib (HCC)     Past Medical History:   Diagnosis Date   • A-fib (HCC)    • Arthritis    • Hypertension      Past Surgical History:   Procedure Laterality Date   • APPENDECTOMY     • BREAST SURGERY     • CATARACT EXTRACTION     •  SECTION     • EYE SURGERY     • HEMORRHOIDECTOMY     • INTRAOCULAR LENS INSERTION     • TONSILLECTOMY        General Information     Row Name 22          Physical Therapy Time and Intention    Document Type evaluation  -CD     Mode of Treatment physical therapy  -CD     Row Name 22          General Information    Patient Profile Reviewed yes  -CD     Prior Level of Function independent:;all household mobility;community mobility;gait;transfer;bed mobility;ADL's;driving;cooking;home management  -CD     Existing Precautions/Restrictions fall  -CD     Barriers to Rehab medically complex;visual deficit  -CD     Row Name 22          Living Environment    People in Home child(amy), adult  PER [facility] PT, LIVES WITH 23 YEAR OLD SON.  -CD     Row Name 22          Home Main Entrance    Number of Stairs, Main Entrance none  -CD     Row Name 22          Stairs Within Home, Primary    Stairs, Within Home, Primary RECENTLY MOVED TO A CONDO AND HAS 12 STEPS TO BEDROOM.  -CD     Number of Stairs, Within Home, Primary twelve  -CD     Row Name 22          Cognition    Orientation Status (Cognition) oriented to;person;place;situation;time  -CD     Row Name 22          Safety Issues, Functional  Mobility    Safety Issues Affecting Function (Mobility) insight into deficits/self-awareness;safety precaution awareness;safety precautions follow-through/compliance  -CD     Impairments Affecting Function (Mobility) balance;cognition;coordination;endurance/activity tolerance;strength;sensation/sensory awareness  -CD     Cognitive Impairments, Mobility Safety/Performance insight into deficits/self-awareness;safety precaution awareness;safety precaution follow-through;sequencing abilities  -CD           User Key  (r) = Recorded By, (t) = Taken By, (c) = Cosigned By    Initials Name Provider Type    CD Selena Durand PT Physical Therapist               Mobility     Row Name 07/02/22 1659          Bed Mobility    Bed Mobility supine-sit  -CD     Supine-Sit [place] (Bed Mobility) moderate assist (50% patient effort);verbal cues  -CD     Assistive Device (Bed Mobility) bed rails;head of bed elevated  -CD     Row Name 07/02/22 1659          Transfers    Comment, (Transfers) CUES FOR HAND PLACEMENT.  -CD     Row Name 07/02/22 1659          Sit-Stand Transfer    Sit-Stand [place] (Transfers) minimum assist (75% patient effort);verbal cues  -CD     Assistive Device (Sit-Stand Transfers) walker, front-wheeled  -CD     Row Name 07/02/22 1659          Gait/Stairs (Locomotion)    [place] Level (Gait) moderate assist (50% patient effort);verbal cues  -CD     Assistive Device (Gait) walker, front-wheeled  -CD     Distance in Feet (Gait) 15 FEET  -CD     Deviations/Abnormal Patterns (Gait) weight shifting decreased;gait speed decreased;yang decreased;stride length decreased  -CD     Bilateral Gait Deviations forward flexed posture;heel strike decreased  -CD     Comment, (Gait/Stairs) PT REQUIRED MANUAL ASSIST TO GUIDE R WALKER AND CUES FOR INCREASED STEP LENGTH ON THE L.  -CD           User Key  (r) = Recorded By, (t) = Taken By, (c) = Cosigned By    Initials Name Provider Type    CD Selena Durand PT Physical  Therapist               Obj/Interventions     [facility] Name 07/02/22 1700          Range of Motion Comprehensive    General Range of Motion bilateral lower extremity ROM WFL  -CD     Row Name 07/02/22 1700          Strength Comprehensive (MMT)    General Manual Muscle Testing (MMT) Assessment lower extremity strength deficits identified  -CD     Comment, General Manual Muscle Testing (MMT) Assessment R LE 4+/5, L LE 4 TO 4-/5.  -CD     Row Name 07/02/22 1700          Motor Skills    Motor Skills coordination  -CD     Coordination gross motor deficit;left;lower extremity;heel to shin;minimal impairment  -CD     [facility] Name 07/02/22 1700          Balance    Balance Assessment sitting static balance;sitting dynamic balance;sit to stand dynamic balance;standing static balance  -CD     Static Sitting Balance standby assist  -CD     Dynamic Sitting Balance standby assist  -CD     Position, Sitting Balance sitting edge of bed  -CD     Static Standing Balance contact guard  -CD     Dynamic Standing Balance moderate assist  -CD     Position/Device Used, Standing Balance supported;walker, front-wheeled  -CD     Comment, Balance TRANSFERS AND GAIT SHORT DISTANCE WITH R WALKER AND CGA/MOD ASSIST.  -CD           User Key  (r) = Recorded By, (t) = Taken By, (c) = Cosigned By    Initials Name Provider Type    CD Selena Durand, PT Physical Therapist               Goals/Plan     [facility] Name 07/02/22 1706          Bed Mobility Goal 1 (PT)    Activity/Assistive Device (Bed Mobility Goal 1, PT) bed mobility activities, all  -CD     [place] Level/Cues Needed (Bed Mobility Goal 1, PT) contact guard required  -CD     Time Frame (Bed Mobility Goal 1, PT) long term goal (LTG);2 weeks  -CD     [facility] Name 07/02/22 1706          Transfer Goal 1 (PT)    Activity/Assistive Device (Transfer Goal 1, PT) sit-to-stand/stand-to-sit;bed-to-chair/chair-to-bed  -CD     [place] Level/Cues Needed (Transfer Goal 1, PT) contact guard required  -CD     Time  Frame (Transfer Goal 1, PT) long term goal (LTG);2 weeks  -CD     Row Name 07/02/22 1706          Gait Training Goal 1 (PT)    Activity/Assistive Device (Gait Training Goal 1, PT) gait (walking locomotion);walker, rolling  -CD     [place] Level (Gait Training Goal 1, PT) contact guard required  -CD     Distance (Gait Training Goal 1, PT) 200 FEET  -CD     Time Frame (Gait Training Goal 1, PT) long term goal (LTG);2 weeks  -CD     Row Name 07/02/22 1706          Therapy Assessment/Plan (PT)    Planned Therapy Interventions (PT) balance training;bed mobility training;gait training;home exercise program;transfer training;strengthening;patient/family education;neuromuscular re-education  -CD           User Key  (r) = Recorded By, (t) = Taken By, (c) = Cosigned By    Initials Name Provider Type    CD Selena Durand, PT Physical Therapist               Clinical Impression     Row Name 07/02/22 1703          Pain Scale: FACES Pre/Post-Treatment    Pain: FACES Scale, Pretreatment 2-->hurts little bit  -CD     Posttreatment Pain Rating 0-->no hurt  -CD     Pain Location lower  -CD     Pain Location - back  -CD     Pre/Posttreatment Pain Comment PT REPORTS IMPROVEMENT AFTER OOB ACTIVITY.  -CD     Row Name 07/02/22 1703          Plan of Care Review    Plan of Care Reviewed With patient;family  -CD     Outcome Evaluation PT PRESENTS WITH EVOLVING SYMPTOMS TO INCLUDE IMPAIRED BALANCE, L SIDED WEAKNESS, VISUAL DEFICITS, IMPAIRED COORDINATION AND DECLINE IN FUNCTIONAL MOBILITY. PT REQUIRED MOD ASSIST FOR SUPINE TO SIT, MIN ASSIST FOR STS AND MOD ASSIST TO AMBULATE X 14 FEET WITH R WALKER. RECOMMEND IRF AT D/C.  -CD     Row Name 07/02/22 1707          Therapy Assessment/Plan (PT)    Patient/Family Therapy Goals Statement (PT) TO RETURN TO PLOF.  -CD     Rehab Potential (PT) good, to achieve stated therapy goals  -CD     Criteria for Skilled Interventions Met (PT) yes  -CD     Therapy Frequency (PT) daily  -CD     Predicted  Duration of Therapy Intervention (PT) 2 WEEKS  -CD     Row Name 07/02/22 1703          Vital Signs    Post Systolic BP Rehab 136  -CD     Post Treatment Diastolic BP 90  -CD     Posttreatment Heart Rate (beats/min) 96  -CD     Pre SpO2 (%) 96  -CD     O2 Delivery Pre Treatment room air  -CD     O2 Delivery Intra Treatment room air  -CD     Post SpO2 (%) 93  -CD     O2 Delivery Post Treatment room air  -CD     Pre Patient Position Supine  -CD     Intra Patient Position Standing  -CD     Post Patient Position Sitting  -CD     Row Name 07/02/22 1703          Positioning and Restraints    Pre-Treatment Position in bed  -CD     Post Treatment Position chair  -CD     In Chair reclined;encouraged to call for assist;exit alarm on;with family/caregiver;legs elevated;notified nsg;waffle cushion;LUE elevated  -CD           User Key  (r) = Recorded By, (t) = Taken By, (c) = Cosigned By    Initials Name Provider Type    CD Selena Durand, PT Physical Therapist               Outcome Measures     Row Name 07/02/22 1707          How much help from another person do you currently need...    Turning from your back to your side while in flat bed without using bedrails? 2  -CD     Moving from lying on back to sitting on the side of a flat bed without bedrails? 2  -CD     Moving to and from a bed to a chair (including a wheelchair)? 2  -CD     Standing up from a chair using your arms (e.g., wheelchair, bedside chair)? 3  -CD     Climbing 3-5 steps with a railing? 2  -CD     To walk in hospital room? 2  -CD     AM-PAC 6 Clicks Score (PT) 13  -CD     Highest level of mobility 4 --> Transferred to chair/commode  -CD     Row Name 07/02/22 1707          Modified Nuckolls Scale    Modified Demetrius Scale 4 - Moderately severe disability.  Unable to walk without assistance, and unable to attend to own bodily needs without assistance.  -CD     Row Name 07/02/22 1707 07/02/22 1115       Functional Assessment    Outcome Measure Options AM-PAC 6  Clicks Basic Mobility (PT);Modified [place]  -CD AM-PAC 6 Clicks Daily Activity (OT)  -TA          User Key  (r) = Recorded By, (t) = Taken By, (c) = Cosigned By    Initials Name Provider Type    Selena Sanderson, PT Physical Therapist    Rex Hernandez, OT Occupational Therapist                             Physical Therapy Education                 Title: PT OT SLP Therapies (Done)     Topic: Physical Therapy (Done)     Point: Mobility training (Done)     Learning Progress Summary           Patient Acceptance, E, VU,NR by CD at 7/2/2022 1708    Comment: BENEFITS OF OOB ACTIVITY, SAFETY WITH MOBILITY, PROGRESSION OF POC, D/C PLANNING,   Family Acceptance, E, VU,NR by CD at 7/2/2022 1708    Comment: BENEFITS OF OOB ACTIVITY, SAFETY WITH MOBILITY, PROGRESSION OF POC, D/C PLANNING,                   Point: Home exercise program (Done)     Learning Progress Summary           Patient Acceptance, E, VU,NR by CD at 7/2/2022 1708    Comment: BENEFITS OF OOB ACTIVITY, SAFETY WITH MOBILITY, PROGRESSION OF POC, D/C PLANNING,   Family Acceptance, E, VU,NR by CD at 7/2/2022 1708    Comment: BENEFITS OF OOB ACTIVITY, SAFETY WITH MOBILITY, PROGRESSION OF POC, D/C PLANNING,                   Point: Body mechanics (Done)     Learning Progress Summary           Patient Acceptance, E, VU,NR by CD at 7/2/2022 1708    Comment: BENEFITS OF OOB ACTIVITY, SAFETY WITH MOBILITY, PROGRESSION OF POC, D/C PLANNING,   Family Acceptance, E, VU,NR by CD at 7/2/2022 1708    Comment: BENEFITS OF OOB ACTIVITY, SAFETY WITH MOBILITY, PROGRESSION OF POC, D/C PLANNING,                   Point: Precautions (Done)     Learning Progress Summary           Patient Acceptance, E, VU,NR by CD at 7/2/2022 1708    Comment: BENEFITS OF OOB ACTIVITY, SAFETY WITH MOBILITY, PROGRESSION OF POC, D/C PLANNING,   Family Acceptance, E, VU,NR by CD at 7/2/2022 1708    Comment: BENEFITS OF OOB ACTIVITY, SAFETY WITH MOBILITY, PROGRESSION OF POC, D/C PLANNING,                                User Key     Initials Effective Dates Name Provider Type Discipline    CD 06/16/21 -  Selena Durand PT Physical Therapist PT              PT Recommendation and Plan  Planned Therapy Interventions (PT): balance training, bed mobility training, gait training, home exercise program, transfer training, strengthening, patient/family education, neuromuscular re-education  Plan of Care Reviewed With: patient, family  Outcome Evaluation: PT PRESENTS WITH EVOLVING SYMPTOMS TO INCLUDE IMPAIRED BALANCE, L SIDED WEAKNESS, VISUAL DEFICITS, IMPAIRED COORDINATION AND DECLINE IN FUNCTIONAL MOBILITY. PT REQUIRED MOD ASSIST FOR SUPINE TO SIT, MIN ASSIST FOR STS AND MOD ASSIST TO AMBULATE X 14 FEET WITH R WALKER. RECOMMEND IRF AT D/C.     Time Calculation:    PT Charges     Row Name 07/02/22 1709             Time Calculation    Start Time 1130  -CD      PT Received On 07/02/22  -CD      PT Goal Re-Cert Due Date 07/12/22  -CD              Time Calculation- PT    Total Timed Code Minutes- PT 88 minute(s)  -CD              Timed Charges    94932 - Gait Training Minutes  8  -CD              Untimed Charges    PT Eval/Re-eval Minutes 47  -CD              Total Minutes    Timed Charges Total Minutes 8  -CD      Untimed Charges Total Minutes 47  -CD       Total Minutes 55  -CD            User Key  (r) = Recorded By, (t) = Taken By, (c) = Cosigned By    Initials Name Provider Type    CD Selena Durand PT Physical Therapist              Therapy Charges for Today     Code Description Service Date Service Provider Modifiers Qty    36956765152 HC PT EVAL MOD COMPLEXITY 4 7/2/2022 Selena Durand, PT GP 1          PT G-Codes  Outcome Measure Options: AM-PAC 6 Clicks Basic Mobility (PT), Modified [place]  AM-PAC 6 Clicks Score (PT): 13  AM-PAC 6 Clicks Score (OT): 16  Modified Demetrius Scale: 4 - Moderately severe disability.  Unable to walk without assistance, and unable to attend to own bodily needs without  assistance.    Selena Durand, PT  7/2/2022

## 2022-07-03 LAB
ALBUMIN SERPL-MCNC: 3.5 G/DL (ref 3.5–5.2)
ALBUMIN/GLOB SERPL: 1.5 G/DL
ALP SERPL-CCNC: 90 U/L (ref 39–117)
ALT SERPL W P-5'-P-CCNC: 11 U/L (ref 1–33)
ANION GAP SERPL CALCULATED.3IONS-SCNC: 10 MMOL/L (ref 5–15)
AST SERPL-CCNC: 14 U/L (ref 1–32)
BASOPHILS # BLD AUTO: 0.02 10*3/MM3 (ref 0–0.2)
BASOPHILS NFR BLD AUTO: 0.5 % (ref 0–1.5)
BILIRUB SERPL-MCNC: 0.5 MG/DL (ref 0–1.2)
BUN SERPL-MCNC: 19 MG/DL (ref 8–23)
BUN/CREAT SERPL: 29.2 (ref 7–25)
CALCIUM SPEC-SCNC: 9.2 MG/DL (ref 8.6–10.5)
CHLORIDE SERPL-SCNC: 108 MMOL/L (ref 98–107)
CO2 SERPL-SCNC: 21 MMOL/L (ref 22–29)
CREAT SERPL-MCNC: 0.65 MG/DL (ref 0.57–1)
DEPRECATED RDW RBC AUTO: 44.6 FL (ref 37–54)
EGFRCR SERPLBLD CKD-EPI 2021: 85.3 ML/MIN/1.73
EOSINOPHIL # BLD AUTO: 0.1 10*3/MM3 (ref 0–0.4)
EOSINOPHIL NFR BLD AUTO: 2.4 % (ref 0.3–6.2)
ERYTHROCYTE [DISTWIDTH] IN BLOOD BY AUTOMATED COUNT: 13.3 % (ref 12.3–15.4)
GLOBULIN UR ELPH-MCNC: 2.4 GM/DL
GLUCOSE SERPL-MCNC: 96 MG/DL (ref 65–99)
HCT VFR BLD AUTO: 44 % (ref 34–46.6)
HGB BLD-MCNC: 14.3 G/DL (ref 12–15.9)
IMM GRANULOCYTES # BLD AUTO: 0.01 10*3/MM3 (ref 0–0.05)
IMM GRANULOCYTES NFR BLD AUTO: 0.2 % (ref 0–0.5)
LYMPHOCYTES # BLD AUTO: 0.65 10*3/MM3 (ref 0.7–3.1)
LYMPHOCYTES NFR BLD AUTO: 15.7 % (ref 19.6–45.3)
MCH RBC QN AUTO: 29.5 PG (ref 26.6–33)
MCHC RBC AUTO-ENTMCNC: 32.5 G/DL (ref 31.5–35.7)
MCV RBC AUTO: 90.9 FL (ref 79–97)
MONOCYTES # BLD AUTO: 0.43 10*3/MM3 (ref 0.1–0.9)
MONOCYTES NFR BLD AUTO: 10.4 % (ref 5–12)
NEUTROPHILS NFR BLD AUTO: 2.92 10*3/MM3 (ref 1.7–7)
NEUTROPHILS NFR BLD AUTO: 70.8 % (ref 42.7–76)
NRBC BLD AUTO-RTO: 0 /100 WBC (ref 0–0.2)
PLATELET # BLD AUTO: 207 10*3/MM3 (ref 140–450)
PMV BLD AUTO: 10.7 FL (ref 6–12)
POTASSIUM SERPL-SCNC: 3.6 MMOL/L (ref 3.5–5.2)
PROT SERPL-MCNC: 5.9 G/DL (ref 6–8.5)
RBC # BLD AUTO: 4.84 10*6/MM3 (ref 3.77–5.28)
SODIUM SERPL-SCNC: 139 MMOL/L (ref 136–145)
WBC NRBC COR # BLD: 4.13 10*3/MM3 (ref 3.4–10.8)

## 2022-07-03 PROCEDURE — 80053 COMPREHEN METABOLIC PANEL: CPT | Performed by: INTERNAL MEDICINE

## 2022-07-03 PROCEDURE — 85025 COMPLETE CBC W/AUTO DIFF WBC: CPT | Performed by: INTERNAL MEDICINE

## 2022-07-03 PROCEDURE — 92523 SPEECH SOUND LANG COMPREHEN: CPT

## 2022-07-03 PROCEDURE — 99232 SBSQ HOSP IP/OBS MODERATE 35: CPT | Performed by: CLINICAL NURSE SPECIALIST

## 2022-07-03 PROCEDURE — 99233 SBSQ HOSP IP/OBS HIGH 50: CPT | Performed by: INTERNAL MEDICINE

## 2022-07-03 RX ORDER — ATORVASTATIN CALCIUM 40 MG/1
80 TABLET, FILM COATED ORAL NIGHTLY
Status: DISCONTINUED | OUTPATIENT
Start: 2022-07-03 | End: 2022-07-06 | Stop reason: HOSPADM

## 2022-07-03 RX ADMIN — Medication 10 ML: at 20:56

## 2022-07-03 RX ADMIN — Medication 10 ML: at 20:57

## 2022-07-03 RX ADMIN — Medication 10 ML: at 08:14

## 2022-07-03 RX ADMIN — ACETAMINOPHEN 650 MG: 325 TABLET ORAL at 18:02

## 2022-07-03 RX ADMIN — RIVAROXABAN 20 MG: 20 TABLET, FILM COATED ORAL at 17:16

## 2022-07-03 RX ADMIN — ATORVASTATIN CALCIUM 80 MG: 40 TABLET, FILM COATED ORAL at 20:56

## 2022-07-03 RX ADMIN — ASPIRIN 325 MG ORAL TABLET 325 MG: 325 PILL ORAL at 08:14

## 2022-07-03 RX ADMIN — ACETAMINOPHEN 650 MG: 325 TABLET ORAL at 22:09

## 2022-07-03 NOTE — PROGRESS NOTES
Stroke Progress Note       Chief Complaint:  Left visual field loss.     Subjective    Subjective     Subjective:  No acute events overnight.  Sitting up eating breakfast.  Denies any new neurologic symptoms.  Continues to complain of tingling in her left hand and foot      Review of Systems   Eyes: Positive for visual disturbance.      Constitutional: No fatigue  HENT: Negative for nosebleeds and rhinorrhea.    Eyes: Negative for redness.   Respiratory: Negative for cough.    Gastrointestinal: Negative for anal bleeding.   Endocrine: Negative for polydipsia.   Genitourinary: Negative for enuresis and urgency.   Musculoskeletal: Negative for joint swelling.   Neurological: Negative for tremors.   Psychiatric/Behavioral: Negative for hallucinations.     Objective      Temp:  [97.9 °F (36.6 °C)-98.2 °F (36.8 °C)] 98.2 °F (36.8 °C)  Heart Rate:  [65-94] 65  Resp:  [16-18] 18  BP: (130-144)/(72-95) 132/72      NEURO    MENTAL STATUS: AAOx3, memory intact, fund of knowledge appropriate    LANG/SPEECH: Naming and repetition intact, fluent, follows 3-step commands    CRANIAL NERVES:  LHH   Pupils equal and reactive, EOMI intact, dysconjugate gaze, no nystagmus  No facial droop, cough and gag intact, shoulder shrug intact, tongue midline.  Left temporal visual field cut noted    MOTOR:  Moves all extremities equally    SENSORY: Normal to light touch all throughout, although complains of tingling    COORDINATION: Normal finger to nose and heel to shin, no tremor, no dysmetria    STATION: Not assessed due to patient condition    GAIT: Not assessed due to patient condition  Results Review:    I reviewed the patient's new clinical results.    Lab Results (last 24 hours)     Procedure Component Value Units Date/Time    Comprehensive Metabolic Panel [[id number]]  (Abnormal) Collected: 07/03/22 0752    Specimen: Blood Updated: 07/03/22 0837     Glucose 96 mg/dL      BUN 19 mg/dL      Creatinine 0.65 mg/dL      Sodium 139 mmol/L       Potassium 3.6 mmol/L      Chloride 108 mmol/L      CO2 21.0 mmol/L      Calcium 9.2 mg/dL      Total Protein 5.9 g/dL      Albumin 3.50 g/dL      ALT (SGPT) 11 U/L      AST (SGOT) 14 U/L      Alkaline Phosphatase 90 U/L      Total Bilirubin 0.5 mg/dL      Globulin 2.4 gm/dL      Comment: Calculated Result        A/G Ratio 1.5 g/dL      BUN/Creatinine Ratio 29.2     Anion Gap 10.0 mmol/L      eGFR 85.3 mL/min/1.73      Comment: National Kidney Foundation and American Society of Nephrology (ASN) Task Force recommended calculation based on the Chronic Kidney Disease Epidemiology Collaboration (CKD-EPI) equation refit without adjustment for race.       Narrative:      GFR Normal >60  Chronic Kidney Disease <60  Kidney Failure <15      CBC & Differential [[id number]] Collected: 07/03/22 0752    Specimen: Blood Updated: 07/03/22 0808    Narrative:      The following orders were created for panel order CBC & Differential.  Procedure                               Abnormality         Status                     ---------                               -----------         ------                     CBC Auto Differential[[id number]]                            In process                   Please view results for these tests on the individual orders.    CBC Auto Differential [[id number]] Collected: 07/03/22 0752    Specimen: Blood Updated: 07/03/22 0808    Hemoglobin A1c [[id number]]  (Normal) Collected: 07/02/22 0726    Specimen: Blood Updated: 07/02/22 0916     Hemoglobin A1C 5.30 %     Narrative:      Hemoglobin A1C Ranges:    Increased Risk for Diabetes  5.7% to 6.4%  Diabetes                     >= 6.5%  Diabetic Goal                < 7.0%    Lipid Panel [[id number]]  (Abnormal) Collected: 07/02/22 0726    Specimen: Blood Updated: 07/02/22 0854     Total Cholesterol 242 mg/dL      Triglycerides 208 mg/dL      HDL Cholesterol 41 mg/dL      LDL Cholesterol  162 mg/dL      VLDL Cholesterol 39 mg/dL      LDL/HDL Ratio 3.89     Narrative:      Cholesterol Reference Ranges  (U.S. Department of Health and Human Services ATP III Classifications)    Desirable          <200 mg/dL  Borderline High    200-239 mg/dL  High Risk          >240 mg/dL      Triglyceride Reference Ranges  (U.S. Department of Health and Human Services ATP III Classifications)    Normal           <150 mg/dL  Borderline High  150-199 mg/dL  High             200-499 mg/dL  Very High        >500 mg/dL    HDL Reference Ranges  (U.S. Department of Health and Human Services ATP III Classifications)    Low     <40 mg/dl (major risk factor for CHD)  High    >60 mg/dl ('negative' risk factor for CHD)        LDL Reference Ranges  (U.S. Department of Health and Human Services ATP III Classifications)    Optimal          <100 mg/dL  Near Optimal     100-129 mg/dL  Borderline High  130-159 mg/dL  High             160-189 mg/dL  Very High        >189 mg/dL        CT Angiogram Neck    Result Date: 7/1/2022   1.  No evidence of carotid or vertebral artery stenosis. 2.  Fusiform aneurysmal enlargement of the supraclinoid segment of the left internal carotid artery.  The left anterior cerebral and middle cerebral arteries are of normal caliber. 2.  There is mild irregularity of the left anterior cerebral artery and a distal branch of the left posterior cerebral artery consistent with intracranial atherosclerosis. 3.  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 Findings personally discussed with Dr. Riggins of the emergency department at 4:20 PM on 7/1/2022  This report was finalized on 7/1/2022 4:25 PM by Noman Varghese MD.      MRI Brain Without Contrast    Result Date: 7/2/2022  1.  Restricted diffusion in the territory of the right posterior cerebral artery. 2.  T2 signal changes involving both cerebral hemispheres occur reflect more chronic small vessel ischemic change. 3.  Susceptibility artifact within the thalamic areas and cerebellum that could be seen with  hypertension. There additionally some susceptibility artifact in the right frontal parietal area. This also may relate to hypertension or could be seen with amyloid angiopathy.  This report was finalized on 7/2/2022 8:23 AM by Kailash Parker MD.      XR Chest 1 View    Result Date: 7/1/2022  Borderline heart shadow enlargement and mild chronic appearing interstitial changes. No clearly acute chest pathology is identified.  This report was finalized on 7/1/2022 2:20 PM by Dr. Mil Betancourt MD.      CT Angiogram Head    Result Date: 7/1/2022   1.  No evidence of carotid or vertebral artery stenosis. 2.  Fusiform aneurysmal enlargement of the supraclinoid segment of the left internal carotid artery.  The left anterior cerebral and middle cerebral arteries are of normal caliber. 2.  There is mild irregularity of the left anterior cerebral artery and a distal branch of the left posterior cerebral artery consistent with intracranial atherosclerosis. 3.  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 Findings personally discussed with Dr. Riggins of the emergency department at 4:20 PM on 7/1/2022  This report was finalized on 7/1/2022 4:25 PM by Noman Varghese MD.      Results for orders placed during the hospital encounter of 07/01/22    Adult Transthoracic Echo Complete W/ Cont if Necessary Per Protocol (With Agitated Saline)    Interpretation Summary  · Left ventricular wall thickness is consistent with mild concentric hypertrophy.  · Normal left ventricular systolic function, estimated EF 55%.  · Left ventricular diastolic function is consistent with (grade I) impaired relaxation.  · Aortic sclerosis with mild aortic insufficiency, no evidence of aortic stenosis.  · Mild mitral vegetation.  · Trace to mild tricuspid regurgitation with normal RVSP.            Assessment/Plan     Assessment/Plan:        Acute ischemic stroke, right posterior cerebral artery, likely etiology artery to artery/  cardioembolic  -CTA evidence right P2 occlusion, left M1 stenosis.   -Patient is not compliant with medications-forgets to take her morning doses.  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     Atrial fibrillation-switched to Xarelto full dose - to ensure compliance    Essential hypertension-normal blood pressure goals    Mixed hyperlipidemia-patient , goal LDL is less than 70, patient needs high intensity statin we will change atorvastatin to 80 mg nightly for now.      Discussed above plan with patient she verbalized understanding.  At instructed her she is not allowed to drive at discharge until released by an outpatient physician whether that be neurology, primary care, or preferably ophthalmology due to her visual field cut.  We will see her back in 4 to 6 weeks in the stroke clinic.  No further inpatient neurologic work-up is needed.  We will sign off.  Please call with any questions or concerns.    MEGHAN Bowen  07/03/22  08:45 EDT

## 2022-07-03 NOTE — THERAPY EVALUATION
Acute Care - Speech Language Pathology Initial Evaluation  [facility]   Cognitive-Communication Evaluation     Patient Name: Yolie Jacob  : 1935  MRN: [id number]  Today's Date: 7/3/2022               Admit Date: 2022     Visit Dx:    ICD-10-CM ICD-9-CM   1. Visual disturbance  H53.9 368.9   2. 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 Cognitive communication deficit  R41.841 799.52     Patient Active Problem List   Diagnosis   • Visual disturbance   • Ataxia   • Paresthesia and pain of left extremity   • Headache   • A-fib (HCC)     Past Medical History:   Diagnosis Date   • A-fib (HCC)    • Arthritis    • Hypertension      Past Surgical History:   Procedure Laterality Date   • APPENDECTOMY     • BREAST SURGERY     • CATARACT EXTRACTION     •  SECTION     • EYE SURGERY     • HEMORRHOIDECTOMY     • INTRAOCULAR LENS INSERTION     • TONSILLECTOMY         SLP Recommendation and Plan  SLP Diagnosis: Mild-moderate cognitive deficits (22)        [facility] Criteria for Skilled Therapy Interventions Met: yes (22)  Anticipated Discharge Disposition (SLP): inpatient rehabilitation facility, anticipate therapy at next level of care (22)        Predicted Duration Therapy Intervention (Days): until discharge (22)                    Plan of Care Reviewed With: patient (22 184)      SLP EVALUATION (last 72 hours)     SLP SLC Evaluation     Row Name 22                   Communication Assessment/Intervention    Document Type evaluation  -SM        Subjective Information no complaints  -SM        Patient Observations alert;cooperative  -SM                  General Information    Patient Profile Reviewed yes  -SM        Pertinent History Of Current Problem R PCA CVA  -SM        Plans/Goals Discussed with patient;agreed upon  -SM        Barriers to Rehab none identified  -SM        Patient's Goals for Discharge return to  all previous roles/activities  -                  Pain Scale: FACES Pre/Post-Treatment    Pain: FACES Scale, Pretreatment 2-->hurts little bit  -SM        Posttreatment Pain Rating 2-->hurts little bit  -                  Comprehension Assessment/Intervention    Comprehension Assessment/Intervention Auditory Comprehension  -                  Auditory Comprehension Assessment/Intervention    Answers Questions (Communication) yes/no;wh questions;personal;simple;WFL;complex;abstract;mild impairment  -        Able to Follow Commands (Communication) 1-step;2-step;WFL  -        Narrative Discourse conversational level;WFL;mild impairment;other (see comments)  some repetitions needed when distracted self  -SM                  Expression Assessment/Intervention    Expression Assessment/Intervention verbal expression  -                  Verbal Expression Assessment/Intervention    Verbal Expression L  -        Conversational Discourse/Fluency WFL  -        Verbal Expression, Comment Tangential at times  -                  Motor Speech Assessment/Intervention    Motor Speech Function L  -                  Cognitive Assessment Intervention- SLP    Cognitive Function (Cognition) mild impairment;moderate impairment  -SM        Orientation Status (Cognition) person;place;time;situation;WFL  -SM        Memory (Cognitive) short-term;mild impairment;functional;mental manipulation;new learning;moderate impairment  -SM        Attention (Cognitive) selective;sustained;mild impairment;attention to detail;moderate impairment  -        Thought Organization (Cognitive) concrete divergent;concrete convergent;WFL;verbal sequencing;mental manipulation;mild impairment;moderate impairment  -SM        Reasoning (Cognitive) simple;WFL;mental flexibility;mild impairment;moderate impairment  -SM        Problem Solving (Cognitive) simple;WFL  -SM        Executive Function (Cognition) deficit awareness;mild  impairment;self-monitoring/correction;home management activities;complex organization;moderate impairment  -        Cognition, Comment No glasses at hospital. Will continue dx tx of reading/writing when arrive/present  -                  SLP Evaluation Clinical Impressions    SLP Diagnosis Mild-moderate cognitive deficits  -        Rehab Potential/Prognosis good  -        SLC Criteria for Skilled Therapy Interventions Met yes  -        Functional Impact difficulty completing home management task  -                  Recommendations    Therapy Frequency (SLP SLC) 5 days per week  -        Predicted Duration Therapy Intervention (Days) until discharge  -        Anticipated Discharge Disposition (SLP) inpatient rehabilitation facility;anticipate therapy at next level of care  -              User Key  (r) = Recorded By, (t) = Taken By, (c) = Cosigned By    Initials Name Effective Dates     Kelli Kirkland MS CCC-SLP 06/16/21 -                    EDUCATION  The patient has been educated in the following areas:     Cognitive Impairment Communication Impairment.           SLP GOALS     Row Name 07/03/22 9289             Attention Goal 1 (SLP)    Improve Attention by Goal 1 (SLP) complete sustained attention task;90%;with minimal cues (75-90%);other (see comments)  + attention to detail  -SM      Time Frame (Attention Goal 1, SLP) short term goal (STG)  -SM      Progress/Outcomes (Attention Goal 1, SLP) new goal  -SM              Functional Problem Solving Skills Goal 1 (SLP)    Improve Problem Solving Through Goal 1 (SLP) complete organization/home management task;sequence steps in a task;90%;with minimal cues (75-90%)  -SM      Time Frame (Problem Solving Goal 1, SLP) short term goal (STG)  -SM      Progress/Outcomes (Problem Solving Goal 1, SLP) new goal  -SM              Executive Functional Skills Goal 1 (SLP)    Improve Executive Function Skills Goal 1 (SLP) identify strategies, strengths,  limitations;identify anticipated needs;organization/planning activity;exhibit cognitive flexibility;perform self-correction;perform self-evaluation;80%;with minimal cues (75-90%)  -SM      Time Frame (Executive Function Skills Goal 1, SLP) short term goal (STG)  -SM      Progress/Outcomes (Executive Function Skills Goal 1, SLP) new goal  -SM              Additional Goal 1 (SLP)    Additional Goal 1, SLP LTG: Improve cog-comm skills in order to participate in care while in hospital setting with 100% accuracy and no cues  -SM            User Key  (r) = Recorded By, (t) = Taken By, (c) = Cosigned By    Initials Name Provider Type    Kelli Butler MS CCC-SLP Speech and Language Pathologist                        Time Calculation:      Time Calculation- SLP     Row Name 07/03/22 1741             Time Calculation- SLP    SLP Start Time 1645  -SM      SLP Received On 07/03/22  -              Untimed Charges    [id number] Eval Speech and Production w/ Language Minutes 40  -SM              Total Minutes    Untimed Charges Total Minutes 40  -SM       Total Minutes 40  -SM            User Key  (r) = Recorded By, (t) = Taken By, (c) = Cosigned By    Initials Name Provider Type    Kelli Butler MS CCC-SLP Speech and Language Pathologist                Therapy Charges for Today     Code Description Service Date Service Provider Modifiers Qty    [id number] HC ST EVAL SPEECH AND PROD W LANG  3 7/3/2022 Kelli Kirkland MS CCC-SLP GN 1                     Kelli Kirkland MS CCC-SLP  7/3/2022

## 2022-07-03 NOTE — PLAN OF CARE
Goal Outcome Evaluation:  Plan of Care Reviewed With: patient         SLP evaluation completed. Will continue to address cognitive deficits. Please see note for further details and recommendations.

## 2022-07-03 NOTE — PLAN OF CARE
Goal Outcome Evaluation:  Plan of Care Reviewed With: patient        Progress: no change  Outcome Evaluation: VSS, RA, NSR on tele. A&Ox4. NIH score of 3 this shift. No neuro changes. Up w/ 1 assist. Voiding spontaneously. No complaints at this time. Plan is to be discharged to rehab facility.

## 2022-07-04 LAB
ALBUMIN SERPL-MCNC: 3.7 G/DL (ref 3.5–5.2)
ALBUMIN/GLOB SERPL: 2.1 G/DL
ALP SERPL-CCNC: 89 U/L (ref 39–117)
ALT SERPL W P-5'-P-CCNC: 10 U/L (ref 1–33)
ANION GAP SERPL CALCULATED.3IONS-SCNC: 12 MMOL/L (ref 5–15)
AST SERPL-CCNC: 14 U/L (ref 1–32)
BASOPHILS # BLD AUTO: 0.02 10*3/MM3 (ref 0–0.2)
BASOPHILS NFR BLD AUTO: 0.6 % (ref 0–1.5)
BILIRUB SERPL-MCNC: 0.5 MG/DL (ref 0–1.2)
BUN SERPL-MCNC: 16 MG/DL (ref 8–23)
BUN/CREAT SERPL: 22.9 (ref 7–25)
CALCIUM SPEC-SCNC: 9.2 MG/DL (ref 8.6–10.5)
CHLORIDE SERPL-SCNC: 108 MMOL/L (ref 98–107)
CO2 SERPL-SCNC: 22 MMOL/L (ref 22–29)
CREAT SERPL-MCNC: 0.7 MG/DL (ref 0.57–1)
DEPRECATED RDW RBC AUTO: 43.6 FL (ref 37–54)
EGFRCR SERPLBLD CKD-EPI 2021: 83.8 ML/MIN/1.73
EOSINOPHIL # BLD AUTO: 0.13 10*3/MM3 (ref 0–0.4)
EOSINOPHIL NFR BLD AUTO: 3.6 % (ref 0.3–6.2)
ERYTHROCYTE [DISTWIDTH] IN BLOOD BY AUTOMATED COUNT: 13.4 % (ref 12.3–15.4)
GLOBULIN UR ELPH-MCNC: 1.8 GM/DL
GLUCOSE SERPL-MCNC: 95 MG/DL (ref 65–99)
HCT VFR BLD AUTO: 43 % (ref 34–46.6)
HGB BLD-MCNC: 14.2 G/DL (ref 12–15.9)
IMM GRANULOCYTES # BLD AUTO: 0.01 10*3/MM3 (ref 0–0.05)
IMM GRANULOCYTES NFR BLD AUTO: 0.3 % (ref 0–0.5)
LYMPHOCYTES # BLD AUTO: 0.63 10*3/MM3 (ref 0.7–3.1)
LYMPHOCYTES NFR BLD AUTO: 17.5 % (ref 19.6–45.3)
MCH RBC QN AUTO: 29.3 PG (ref 26.6–33)
MCHC RBC AUTO-ENTMCNC: 33 G/DL (ref 31.5–35.7)
MCV RBC AUTO: 88.8 FL (ref 79–97)
MONOCYTES # BLD AUTO: 0.44 10*3/MM3 (ref 0.1–0.9)
MONOCYTES NFR BLD AUTO: 12.2 % (ref 5–12)
NEUTROPHILS NFR BLD AUTO: 2.38 10*3/MM3 (ref 1.7–7)
NEUTROPHILS NFR BLD AUTO: 65.8 % (ref 42.7–76)
NRBC BLD AUTO-RTO: 0 /100 WBC (ref 0–0.2)
PLATELET # BLD AUTO: 209 10*3/MM3 (ref 140–450)
PMV BLD AUTO: 10.9 FL (ref 6–12)
POTASSIUM SERPL-SCNC: 3.7 MMOL/L (ref 3.5–5.2)
PROT SERPL-MCNC: 5.5 G/DL (ref 6–8.5)
RBC # BLD AUTO: 4.84 10*6/MM3 (ref 3.77–5.28)
SODIUM SERPL-SCNC: 142 MMOL/L (ref 136–145)
WBC NRBC COR # BLD: 3.61 10*3/MM3 (ref 3.4–10.8)

## 2022-07-04 PROCEDURE — 85025 COMPLETE CBC W/AUTO DIFF WBC: CPT | Performed by: INTERNAL MEDICINE

## 2022-07-04 PROCEDURE — 99232 SBSQ HOSP IP/OBS MODERATE 35: CPT | Performed by: INTERNAL MEDICINE

## 2022-07-04 PROCEDURE — 80053 COMPREHEN METABOLIC PANEL: CPT | Performed by: INTERNAL MEDICINE

## 2022-07-04 RX ADMIN — Medication 10 ML: at 20:33

## 2022-07-04 RX ADMIN — Medication 10 ML: at 08:59

## 2022-07-04 RX ADMIN — ACETAMINOPHEN 650 MG: 325 TABLET ORAL at 17:37

## 2022-07-04 RX ADMIN — SENNOSIDES AND DOCUSATE SODIUM 2 TABLET: 50; 8.6 TABLET ORAL at 20:31

## 2022-07-04 RX ADMIN — RIVAROXABAN 20 MG: 20 TABLET, FILM COATED ORAL at 17:30

## 2022-07-04 RX ADMIN — SENNOSIDES AND DOCUSATE SODIUM 2 TABLET: 50; 8.6 TABLET ORAL at 09:00

## 2022-07-04 RX ADMIN — ATORVASTATIN CALCIUM 80 MG: 40 TABLET, FILM COATED ORAL at 20:32

## 2022-07-04 RX ADMIN — ASPIRIN 325 MG ORAL TABLET 325 MG: 325 PILL ORAL at 08:59

## 2022-07-04 NOTE — PROGRESS NOTES
[facility] Medicine Services  PROGRESS NOTE    Patient Name: Yolie Jacob  : 1935  MRN: [id number]    Date of Admission: 2022  Primary Care Physician: Provider, No Known    Subjective   Subjective     CC:  CVA workup    HPI:  Sitting up eating breakfast. She is doing better today. No new issues. Awaiting rehab    ROS:  Gen- No fevers, chills  CV- No chest pain, palpitations  Resp- No cough, dyspnea  GI- No N/V/D, abd pain  +visual changes with blurry vision improved        Objective   Objective     Vital Signs:   Temp:  [97.9 °F (36.6 °C)-98.6 °F (37 °C)] 97.9 °F (36.6 °C)  Heart Rate:  [64-77] 67  Resp:  [14-18] 18  BP: (128-158)/(77-99) 145/80     Physical Exam:  GEN: NAD, resting in bed, awake,eating breakfast  HEENT: on room air, atraumatic, normocephalic  NECK: supple, no masses  RESP: on room air, normal effort  CV: on tele, sinus rhythm  PSYCH: normal affect, appropriate  NEURO: awake, alert, no focal deficits noted aside from visual complaints   MSK: no edema noted  SKIN: no rashes noted       Results Reviewed:  LAB RESULTS:      Lab 22  0704 22  0752 22  1440   WBC 3.61 4.13 5.12   HEMOGLOBIN 14.2 14.3 15.3   HEMATOCRIT 43.0 44.0 44.2   PLATELETS 209 207 213   NEUTROS ABS 2.38 2.92 3.96   IMMATURE GRANS (ABS) 0.01 0.01 0.01   LYMPHS ABS 0.63* 0.65* 0.64*   MONOS ABS 0.44 0.43 0.43   EOS ABS 0.13 0.10 0.06   MCV 88.8 90.9 87.2         Lab 22  0704 22  0752 22  0726 22  1440   SODIUM 142 139  --  141   POTASSIUM 3.7 3.6  --  3.7   CHLORIDE 108* 108*  --  107   CO2 22.0 21.0*  --  24.0   ANION GAP 12.0 10.0  --  10.0   BUN 16 19  --  16   CREATININE 0.70 0.65  --  0.61   EGFR 83.8 85.3  --  86.7   GLUCOSE 95 96  --  102*   CALCIUM 9.2 9.2  --  9.1   MAGNESIUM  --   --   --  2.0   HEMOGLOBIN A1C  --   --  5.30  --    TSH  --   --   --  2.040         Lab 22  0704 22  0752 22  1440   TOTAL PROTEIN 5.5* 5.9* 6.4    ALBUMIN 3.70 3.50 4.20   GLOBULIN 1.8 2.4 2.2   ALT (SGPT) 10 11 14   AST (SGOT) 14 14 17   BILIRUBIN 0.5 0.5 0.6   ALK PHOS 89 90 102         Lab 07/01/22  1440   TROPONIN T 0.014         Lab 07/02/22  0726   CHOLESTEROL 242*   LDL CHOL 162*   HDL CHOL 41   TRIGLYCERIDES 208*         Lab 07/01/22  1440   FOLATE 19.70   VITAMIN B 12 267         Brief Urine Lab Results  (Last result in the past 365 days)      Color   Clarity   Blood   Leuk Est   Nitrite   Protein   CREAT   Urine HCG        07/01/22 1557 Yellow   Clear   Negative   Trace   Negative   Negative                 Microbiology Results Abnormal     Procedure Component Value - Date/Time    COVID PRE-OP / PRE-PROCEDURE SCREENING ORDER (NO ISOLATION) - Swab, Nasopharynx [[id number]]  (Normal) Collected: 07/01/22 1555    Lab Status: Final result Specimen: Swab from Nasopharynx Updated: 07/01/22 1617    Narrative:      The following orders were created for panel order COVID PRE-OP / PRE-PROCEDURE SCREENING ORDER (NO ISOLATION) - Swab, Nasopharynx.  Procedure                               Abnormality         Status                     ---------                               -----------         ------                     COVID-19 and FLU A/B PCR...[[id number]]  Normal              Final result                 Please view results for these tests on the individual orders.    COVID-19 and FLU A/B PCR - Swab, Nasopharynx [[id number]]  (Normal) Collected: 07/01/22 1555    Lab Status: Final result Specimen: Swab from Nasopharynx Updated: 07/01/22 1617     COVID19 Not Detected     Influenza A PCR Not Detected     Influenza B PCR Not Detected    Narrative:      Fact sheet for providers: https://www.fda.gov/media/854317/download    Fact sheet for patients: https://www.fda.gov/media/741412/download    Test performed by PCR.          Adult Transthoracic Echo Complete W/ Cont if Necessary Per Protocol (With Agitated Saline)    Result Date: 7/2/2022  · Left ventricular wall  thickness is consistent with mild concentric hypertrophy. · Normal left ventricular systolic function, estimated EF 55%. · Left ventricular diastolic function is consistent with (grade I) impaired relaxation. · Aortic sclerosis with mild aortic insufficiency, no evidence of aortic stenosis. · Mild mitral vegetation. · Trace to mild tricuspid regurgitation with normal RVSP.        Results for orders placed during the hospital encounter of 07/01/22    Adult Transthoracic Echo Complete W/ Cont if Necessary Per Protocol (With Agitated Saline)    Interpretation Summary  · Left ventricular wall thickness is consistent with mild concentric hypertrophy.  · Normal left ventricular systolic function, estimated EF 55%.  · Left ventricular diastolic function is consistent with (grade I) impaired relaxation.  · Aortic sclerosis with mild aortic insufficiency, no evidence of aortic stenosis.  · Mild mitral vegetation.  · Trace to mild tricuspid regurgitation with normal RVSP.      I have reviewed the medications:  Scheduled Meds:aspirin, 325 mg, Oral, Daily  atorvastatin, 80 mg, Oral, Nightly  rivaroxaban, 20 mg, Oral, Daily With Dinner  senna-docusate sodium, 2 tablet, Oral, BID  sodium chloride, 10 mL, Intravenous, Q12H  sodium chloride, 10 mL, Intravenous, Q12H      Continuous Infusions:   PRN Meds:.•  acetaminophen **OR** acetaminophen **OR** acetaminophen  •  ALPRAZolam  •  senna-docusate sodium **AND** polyethylene glycol **AND** bisacodyl **AND** bisacodyl  •  sodium chloride  •  sodium chloride  •  sodium chloride    Assessment & Plan   Assessment & Plan     Active Hospital Problems    Diagnosis  POA   • Visual disturbance [H53.9]  Yes   • Ataxia [R27.0]  Unknown   • Paresthesia and pain of left extremity [M79.609, R20.2]  Unknown   • Headache [R51.9]  Unknown   • A-fib ([facility]) [I48.91]  Unknown      Resolved Hospital Problems   No resolved problems to display.        Brief Hospital Course to date:    Yolie Jacbo is a  87 y.o. female PMH afib (eliquis), hx of COVID (08/2021) presenting with ataxia, headache, dizziness, blurry vision, decreased sensation to the left arm and left leg.     This patient's problems and plans were partially entered by my partner and updated as appropriate by me 07/04/22.         Patient with disturbance, headache, ataxia, left-sided paresthesia   Acute ischemic stroke- right PCA- felt to be cardioembolic   Right sided p2 (of right pca) occlusion on CT Angiogram  LICA Supraclinoid aneurysm 9 mm  -patient reports recently taking eliquis 2.5mg once daily instead of twice daily,? She reports concern over bleeding  - Recently discharged from [facility] reporting aneurysm.  - CTA head and neck showed possible right P2 occlusion unsure if it is acute or chronic  - MRI brain without contrast is done with noted restricted diffusion in the territory of R PCA. T2 changes in both hemispheres which appear to be more small vessel change.   - Aspirin recommended to go to full dosing, atorvastatin 40 mg at bedtime.  - A1c, lipid panel, TTE; all reviewed  - PT/OT/SLP- recommending rehab and patient is agreeable  -- patient will be transitioned to xarelto given once daily dosing and have counseled patient on importance of compliance      Atrial fibrillation, rate controlled  - Patient reports taking Eliquis 2.5 mg daily prior to symptoms (instead of her prescribed 2.5mg bid). More recently had been taken off eliquis altogether since her recent admission at [facility] a few days ago.         HLD  - Atorvastatin 40 mg daily.  Lipid panel noted        Expected Discharge Location and Transportation: rehab- patient agreeable  Expected Discharge Date: 7/6    DVT prophylaxis:  Medical and mechanical DVT prophylaxis orders are present.     AM-PAC 6 Clicks Score (PT): 17 (07/03/22 2040)    CODE STATUS:   Code Status and Medical Interventions:   Ordered at: 07/01/22 6171     Level Of Support Discussed With:    Patient      Code Status (Patient has no pulse and is not breathing):    CPR (Attempt to Resuscitate)     Medical Interventions (Patient has pulse or is breathing):    Full Support       Kamilah Hdez MD  07/04/22

## 2022-07-05 PROBLEM — I63.9 CARDIOEMBOLIC STROKE: Status: ACTIVE | Noted: 2022-07-05

## 2022-07-05 PROCEDURE — 97535 SELF CARE MNGMENT TRAINING: CPT

## 2022-07-05 PROCEDURE — 99232 SBSQ HOSP IP/OBS MODERATE 35: CPT | Performed by: INTERNAL MEDICINE

## 2022-07-05 PROCEDURE — 97530 THERAPEUTIC ACTIVITIES: CPT

## 2022-07-05 RX ADMIN — ATORVASTATIN CALCIUM 80 MG: 40 TABLET, FILM COATED ORAL at 21:09

## 2022-07-05 RX ADMIN — ASPIRIN 325 MG ORAL TABLET 325 MG: 325 PILL ORAL at 08:15

## 2022-07-05 RX ADMIN — RIVAROXABAN 20 MG: 20 TABLET, FILM COATED ORAL at 17:59

## 2022-07-05 RX ADMIN — SENNOSIDES AND DOCUSATE SODIUM 2 TABLET: 50; 8.6 TABLET ORAL at 21:09

## 2022-07-05 RX ADMIN — Medication 10 ML: at 21:11

## 2022-07-05 RX ADMIN — SENNOSIDES AND DOCUSATE SODIUM 2 TABLET: 50; 8.6 TABLET ORAL at 08:15

## 2022-07-05 RX ADMIN — Medication 10 ML: at 08:14

## 2022-07-05 RX ADMIN — POLYETHYLENE GLYCOL 3350 17 G: 17 POWDER, FOR SOLUTION ORAL at 08:15

## 2022-07-05 NOTE — PROGRESS NOTES
[facility] Medicine Services  PROGRESS NOTE    Patient Name: Yolie Jacob  : 1935  MRN: [id number]    Date of Admission: 2022  Primary Care Physician: Provider, No Known    Subjective   Subjective     CC:  Follow-up stroke    HPI:  Patient moved to this floor, unclear reasons.  Today she states that she is doing fine, biggest concern is persistent visual difficulties, unable to read at this time.  Awaiting transfer to rehab    ROS:  Gen- No fevers, chills  CV- No chest pain, palpitations  Resp- No cough, dyspnea  GI- No N/V/D, abd pain    Objective   Objective     Vital Signs:   Temp:  [97.8 °F (36.6 °C)-98.5 °F (36.9 °C)] 98.2 °F (36.8 °C)  Heart Rate:  [] 66  Resp:  [16-18] 18  BP: (139-174)/() 174/92     Physical Exam:  Constitutional: No acute distress, awake, alert, sitting up in bedside chair  HENT: NCAT, mucous membranes moist  Respiratory: Clear to auscultation bilaterally, respiratory effort normal   Cardiovascular: RRR, no murmurs, rubs, or gallops, palpable radial pulses  Gastrointestinal: Positive bowel sounds, soft, nontender, nondistended  Musculoskeletal: No bilateral ankle edema  Psychiatric: Appropriate affect, cooperative  Neurologic: Speech clear, moving extremities spontaneously    Results Reviewed:  LAB RESULTS:      Lab 22  0704 22  0752 22  1440   WBC 3.61 4.13 5.12   HEMOGLOBIN 14.2 14.3 15.3   HEMATOCRIT 43.0 44.0 44.2   PLATELETS 209 207 213   NEUTROS ABS 2.38 2.92 3.96   IMMATURE GRANS (ABS) 0.01 0.01 0.01   LYMPHS ABS 0.63* 0.65* 0.64*   MONOS ABS 0.44 0.43 0.43   EOS ABS 0.13 0.10 0.06   MCV 88.8 90.9 87.2         Lab 22  0704 22  0752 22  0726 22  1440   SODIUM 142 139  --  141   POTASSIUM 3.7 3.6  --  3.7   CHLORIDE 108* 108*  --  107   CO2 22.0 21.0*  --  24.0   ANION GAP 12.0 10.0  --  10.0   BUN 16 19  --  16   CREATININE 0.70 0.65  --  0.61   EGFR 83.8 85.3  --  86.7   GLUCOSE 95 96  --   102*   CALCIUM 9.2 9.2  --  9.1   MAGNESIUM  --   --   --  2.0   HEMOGLOBIN A1C  --   --  5.30  --    TSH  --   --   --  2.040         Lab 07/04/22  0704 07/03/22  0752 07/01/22  1440   TOTAL PROTEIN 5.5* 5.9* 6.4   ALBUMIN 3.70 3.50 4.20   GLOBULIN 1.8 2.4 2.2   ALT (SGPT) 10 11 14   AST (SGOT) 14 14 17   BILIRUBIN 0.5 0.5 0.6   ALK PHOS 89 90 102         Lab 07/01/22  1440   TROPONIN T 0.014         Lab 07/02/22  0726   CHOLESTEROL 242*   LDL CHOL 162*   HDL CHOL 41   TRIGLYCERIDES 208*         Lab 07/01/22  1440   FOLATE 19.70   VITAMIN B 12 267         Brief Urine Lab Results  (Last result in the past 365 days)      Color   Clarity   Blood   Leuk Est   Nitrite   Protein   CREAT   Urine HCG        07/01/22 1557 Yellow   Clear   Negative   Trace   Negative   Negative                 Microbiology Results Abnormal     Procedure Component Value - Date/Time    COVID PRE-OP / PRE-PROCEDURE SCREENING ORDER (NO ISOLATION) - Swab, Nasopharynx [[id number]]  (Normal) Collected: 07/01/22 1555    Lab Status: Final result Specimen: Swab from Nasopharynx Updated: 07/01/22 1617    Narrative:      The following orders were created for panel order COVID PRE-OP / PRE-PROCEDURE SCREENING ORDER (NO ISOLATION) - Swab, Nasopharynx.  Procedure                               Abnormality         Status                     ---------                               -----------         ------                     COVID-19 and FLU A/B PCR...[[id number]]  Normal              Final result                 Please view results for these tests on the individual orders.    COVID-19 and FLU A/B PCR - Swab, Nasopharynx [[id number]]  (Normal) Collected: 07/01/22 1555    Lab Status: Final result Specimen: Swab from Nasopharynx Updated: 07/01/22 1617     COVID19 Not Detected     Influenza A PCR Not Detected     Influenza B PCR Not Detected    Narrative:      Fact sheet for providers: https://www.fda.gov/media/007087/download    Fact sheet for patients:  https://www.fda.gov/media/288882/download    Test performed by PCR.          No radiology results from the last 24 hrs    Results for orders placed during the hospital encounter of 07/01/22    Adult Transthoracic Echo Complete W/ Cont if Necessary Per Protocol (With Agitated Saline)    Interpretation Summary  · Left ventricular wall thickness is consistent with mild concentric hypertrophy.  · Normal left ventricular systolic function, estimated EF 55%.  · Left ventricular diastolic function is consistent with (grade I) impaired relaxation.  · Aortic sclerosis with mild aortic insufficiency, no evidence of aortic stenosis.  · Mild mitral vegetation.  · Trace to mild tricuspid regurgitation with normal RVSP.      I have reviewed the medications:  Scheduled Meds:aspirin, 325 mg, Oral, Daily  atorvastatin, 80 mg, Oral, Nightly  rivaroxaban, 20 mg, Oral, Daily With Dinner  senna-docusate sodium, 2 tablet, Oral, BID  sodium chloride, 10 mL, Intravenous, Q12H  sodium chloride, 10 mL, Intravenous, Q12H      Continuous Infusions:   PRN Meds:.•  acetaminophen **OR** acetaminophen **OR** acetaminophen  •  ALPRAZolam  •  senna-docusate sodium **AND** polyethylene glycol **AND** bisacodyl **AND** bisacodyl  •  sodium chloride  •  sodium chloride  •  sodium chloride    Assessment & Plan   Assessment & Plan     Active Hospital Problems    Diagnosis  POA   • Visual disturbance [H53.9]  Yes   • Ataxia [R27.0]  Unknown   • Paresthesia and pain of left extremity [M79.609, R20.2]  Unknown   • Headache [R51.9]  Unknown   • A-fib (HCC) [I48.91]  Unknown      Resolved Hospital Problems   No resolved problems to display.        Brief Hospital Course to date:  Yolie Jcaob is a 87 y.o. female with atrial fibrillation chronically on Eliquis, Covid 19 infection 8/2021, apparently recently admitted to [facility] and discharged the day PTA reportedly with CTA head/neck demonstrating 9 mm L ICA aneurysm and L M1 stenosis, was instructed  to follow-up with neurosurgery; presented our facility with ataxia, headache, blurred vision after reportedly being told not to take her home Eliquis on 6/29 by the ED physician at OSH.  Stroke neurology was consulted and she was diagnosed with acute ischemic stroke of the RT posterior cerebral artery, felt to be cardioembolic, they also reported the patient is not consistently taking her medications, they recommended full dose aspirin and transition Eliquis to Xarelto to assist with adherence    The following problems are new to me today    Assessment/plan    Acute ischemic RT posterior cerebral artery stroke, suspected cardioembolic  Hyperlipidemia  -Seen by stroke neurology, recommending full dose aspirin; Eliquis was transitioned to Xarelto to assist with adherence (had only been taking Eliquis daily)  - Aspirin, atorvastatin, Xarelto  -PT/OT/SLP, rehab pending    Fusiform dilation LT supraclinoid aneurysm 8 mm  -Per stroke neurology, asymptomatic and plan for neuro interventional follow-up outpatient    Atrial fibrillation, rate controlled  -Inconsistently taking Eliquis PTA and reported being told to stop by [facility] ED physician  -Continue Xarelto    Expected Discharge Location and Transportation: Rehab, medical van  Expected Discharge Date: [place] for transportation 7/6 at 1130    DVT prophylaxis:  Medical and mechanical DVT prophylaxis orders are present.     AM-PAC 6 Clicks Score (PT): 17 (07/04/22 2042)    CODE STATUS:   Code Status and Medical Interventions:   Ordered at: 07/01/22 1827     Level Of Support Discussed With:    Patient     Code Status (Patient has no pulse and is not breathing):    CPR (Attempt to Resuscitate)     Medical Interventions (Patient has pulse or is breathing):    Full Support       Kvng Gore, DO  07/05/22

## 2022-07-05 NOTE — THERAPY TREATMENT NOTE
Patient Name: Yolie Jacob  : 1935    MRN: [id number]                              Today's Date: 2022       Admit Date: 2022    Visit Dx:     ICD-10-CM ICD-9-CM   1. Visual disturbance  H53.9 368.9   2. 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 Cognitive communication deficit  R41.841 799.52     Patient Active Problem List   Diagnosis   • Visual disturbance   • Ataxia   • Paresthesia and pain of left extremity   • Headache   • A-fib (HCC)     Past Medical History:   Diagnosis Date   • A-fib (HCC)    • Arthritis    • Hypertension      Past Surgical History:   Procedure Laterality Date   • APPENDECTOMY     • BREAST SURGERY     • CATARACT EXTRACTION     •  SECTION     • EYE SURGERY     • HEMORRHOIDECTOMY     • INTRAOCULAR LENS INSERTION     • TONSILLECTOMY        General Information     Row Name 22 1104          OT Time and Intention    Document Type therapy note (daily note)  -CS     Mode of Treatment occupational therapy  -CS     Row Name 22 1104          General Information    Patient Profile Reviewed yes  -CS     Existing Precautions/Restrictions fall  -CS     Barriers to Rehab medically complex  -CS     Row Name 22 1104          Cognition    Orientation Status (Cognition) oriented x 3  -CS     Row Name 22 1104          Safety Issues, Functional Mobility    Safety Issues Affecting Function (Mobility) insight into deficits/self-awareness;safety precaution awareness;safety precautions follow-through/compliance  -CS     Impairments Affecting Function (Mobility) balance;strength;sensation/sensory awareness;endurance/activity tolerance  -CS           User Key  (r) = Recorded By, (t) = Taken By, (c) = Cosigned By    Initials Name Provider Type    CS Wilfrid Vasques OT Occupational Therapist                 Mobility/ADL's     Row Name 22 1105          Bed Mobility    Bed Mobility supine-sit;scooting/bridging  -CS      Scooting/Bridging [place] (Bed Mobility) contact guard  -CS     Supine-Sit [place] (Bed Mobility) standby assist  -CS     Assistive Device (Bed Mobility) bed rails;head of bed elevated  -CS     Comment, (Bed Mobility) appropriate sequencing intact, no dizziness reported upon standing  -     Row Name 07/05/22 1105          Transfers    Transfers bed-chair transfer;toilet transfer  -     Bed-Chair [place] (Transfers) contact guard  -     Assistive Device (Bed-Chair Transfers) walker, front-wheeled  -     Sit-Stand [place] (Transfers) standby assist  -CS     [place] Level (Toilet Transfer) standby assist  -CS     Assistive Device (Toilet Transfer) grab bars/safety frame;walker, front-wheeled  -     Row Name 07/05/22 1105          Bed-Chair Transfer    Comment, (Bed-Chair Transfer) cues for AD positioning and environment navigation  -     Row Name 07/05/22 1105          Sit-Stand Transfer    Assistive Device (Sit-Stand Transfers) walker, front-wheeled  -     Row Name 07/05/22 1105          Toilet Transfer    Type (Toilet Transfer) sit-stand;stand-sit  -     Comment, (Toilet Transfer) cues for grab bar use  -     Row Name 07/05/22 1105          Functional Mobility    Functional Mobility- Comment CGA for functional hallway distance(s) w/ RW, one standing rest break, occasional verbal cues for environment scan/navigation  -     Row Name 07/05/22 1105          Activities of Daily Living    BADL Assessment/Intervention upper body dressing;grooming;toileting  -     Row Name 07/05/22 1105          Upper Body Dressing Assessment/Training    [place] Level (Upper Body Dressing) don;pajama/robe;standby assist  -     Position (Upper Body Dressing) edge of bed sitting  -     Row Name 07/05/22 1105          Grooming Assessment/Training    [place] Level (Grooming) wash face, hands;hair care, combing/brushing;set up;supervision  -     Position (Grooming) sink side  -      Comment, (Grooming) appropriate sequencing intact, cues for optimal AD positioning at sink  -     Row Name 07/05/22 1105          Toileting Assessment/Training    [place] Level (Toileting) toileting skills;adjust/manage clothing;minimum assist (75% patient effort);perform perineal hygiene;supervision  -     Comment, (Toileting) cues for grab bar use for stability for clothing mngt  -           User Key  (r) = Recorded By, (t) = Taken By, (c) = Cosigned By    Initials Name Provider Type     Wilfrid Vasques, OT Occupational Therapist               Obj/Interventions     [facility] Name 07/05/22 1109          Shoulder (Therapeutic Exercise)    Shoulder (Therapeutic Exercise) AROM (active range of motion)  -     Shoulder AROM (Therapeutic Exercise) bilateral;extension;flexion;horizontal aBduction/aDduction;2 sets;10 repetitions  -[facility] Name 07/05/22 1109          Elbow/Forearm (Therapeutic Exercise)    Elbow/Forearm (Therapeutic Exercise) AROM (active range of motion)  -     Elbow/Forearm AROM (Therapeutic Exercise) bilateral;flexion;extension;2 sets;10 repetitions  -[facility] Name 07/05/22 1109          Motor Skills    Motor Skills motor control/coordination interventions;functional endurance  -     Functional Endurance one standing rest break while walking  -     Motor Control/Coordination Interventions occupation/activity based treatment;gross motor coordination activities  -     Therapeutic Exercise shoulder;elbow/forearm;other (see comments)  BUE AROM incorporated into targeted reaching activities standing at RW  -     Row Name 07/05/22 1109          Balance    Balance Assessment sitting static balance;sitting dynamic balance;standing static balance;standing dynamic balance  -     Static Sitting Balance independent  -     Dynamic Sitting Balance supervision  -     Position, Sitting Balance unsupported;sitting edge of bed  -     Static Standing Balance standby assist  -     Dynamic  Standing Balance contact guard  -     Position/Device Used, Standing Balance supported;walker, front-wheeled  -     Balance Interventions sit to stand;standing;occupation based/functional task;sitting;dynamic reaching;UE activity with balance activity  -     Comment, Balance targeted reaching at RW w/ CGA and Mod dynamic challenge, no LOB during ADL completion  -           User Key  (r) = Recorded By, (t) = Taken By, (c) = Cosigned By    Initials Name Provider Type     Wilfrid Vasques OT Occupational Therapist               Goals/Plan    No documentation.                Clinical Impression     Row Name 07/05/22 1111          Pain Assessment    Pretreatment Pain Rating 0/10 - no pain  -CS     Posttreatment Pain Rating 0/10 - no pain  -CS     Pre/Posttreatment Pain Comment tolerated  -     Pain Intervention(s) Repositioned;Ambulation/increased activity  -     Row Name 07/05/22 1111          Plan of Care Review    Plan of Care Reviewed With patient  -CS     Progress improving  -     Outcome Evaluation Pt has progressed to CGA/SBA for bed mobility, transfers, and functional distance(s) w/ RW. Continues to demonstrate mild LLE weakness and dynamic balance deficit. SBA for toileting tasks and supervision for sinkside grooming. CGA for targeted/dynamic reaching activities. Significant visual deficit, recommend driving eval. Will cont IPOT per POC as tolerated. Rec d/c to short IP rehab stay.  -     Row Name 07/05/22 1111          Therapy Plan Review/Discharge Plan (OT)    Anticipated Discharge Disposition (OT) inpatient rehabilitation facility  -     Row Name 07/05/22 1111          Vital Signs    Pre Systolic BP Rehab --  RN cleared for tx, VSS on RA  -CS     O2 Delivery Pre Treatment room air  -CS     O2 Delivery Intra Treatment room air  -CS     O2 Delivery Post Treatment room air  -CS     Pre Patient Position Supine  -CS     Intra Patient Position Standing  -CS     Post Patient Position Sitting   -CS     Row Name 07/05/22 1111          Positioning and Restraints    Pre-Treatment Position in bed  -CS     Post Treatment Position chair  -CS     In Chair notified nsg;reclined;sitting;call light within reach;encouraged to call for assist;exit alarm on;waffle cushion;legs elevated  -CS           User Key  (r) = Recorded By, (t) = Taken By, (c) = Cosigned By    Initials Name Provider Type    CS Wilfrid Vasques OT Occupational Therapist               Outcome Measures     Row Name 07/05/22 1114          How much help from another is currently needed...    Putting on and taking off regular lower body clothing? 3  -CS     Bathing (including washing, rinsing, and drying) 3  -CS     Toileting (which includes using toilet bed pan or urinal) 3  -CS     Putting on and taking off regular upper body clothing 3  -CS     Taking care of personal grooming (such as brushing teeth) 3  -CS     Eating meals 4  -CS     AM-PAC 6 Clicks Score (OT) 19  -CS     Row Name 07/05/22 1114          Functional Assessment    Outcome Measure Options AM-PAC 6 Clicks Daily Activity (OT)  -CS           User Key  (r) = Recorded By, (t) = Taken By, (c) = Cosigned By    Initials Name Provider Type    Wilfrid Loja OT Occupational Therapist                Occupational Therapy Education                 Title: PT OT SLP Therapies (Done)     Topic: Occupational Therapy (Done)     Point: ADL training (Done)     Description:   Instruct learner(s) on proper safety adaptation and remediation techniques during self care or transfers.   Instruct in proper use of assistive devices.              Learning Progress Summary           Patient Acceptance, E,D, DU by CS at 7/5/2022 1115    Acceptance, E, VU,NR by TA at 7/2/2022 1319                   Point: Home exercise program (Done)     Description:   Instruct learner(s) on appropriate technique for monitoring, assisting and/or progressing therapeutic exercises/activities.              Learning Progress  Summary           Patient Acceptance, E,D, DU by CS at 7/5/2022 1115    Acceptance, E, VU,NR by TA at 7/2/2022 1319                   Point: Precautions (Done)     Description:   Instruct learner(s) on prescribed precautions during self-care and functional transfers.              Learning Progress Summary           Patient Acceptance, E,D, DU by CS at 7/5/2022 1115    Acceptance, E, VU,NR by TA at 7/2/2022 1319                   Point: Body mechanics (Done)     Description:   Instruct learner(s) on proper positioning and spine alignment during self-care, functional mobility activities and/or exercises.              Learning Progress Summary           Patient Acceptance, E,D, DU by CS at 7/5/2022 1115    Acceptance, E, VU,NR by TA at 7/2/2022 1319                               User Key     Initials Effective Dates Name Provider Type Discipline     06/16/21 -  Rex Thomas, OT Occupational Therapist OT     06/16/21 -  Wilfrid Vasques, OT Occupational Therapist OT              OT Recommendation and Plan     Plan of Care Review  Plan of Care Reviewed With: patient  Progress: improving  Outcome Evaluation: Pt has progressed to CGA/SBA for bed mobility, transfers, and functional distance(s) w/ RW. Continues to demonstrate mild LLE weakness and dynamic balance deficit. SBA for toileting tasks and supervision for sinkside grooming. CGA for targeted/dynamic reaching activities. Significant visual deficit, recommend driving eval. Will cont IPOT per POC as tolerated. Rec d/c to short IP rehab stay.     Time Calculation:    Time Calculation- OT     Row Name 07/05/22 1115             Time Calculation- OT    OT Start Time 1026  -CS      OT Received On 07/05/22  -CS      OT Goal Re-Cert Due Date 07/12/22  -CS              Timed Charges    49679 - OT Therapeutic Activity Minutes 15  -CS      23020 - OT Self Care/Mgmt Minutes 10  -CS              Total Minutes    Timed Charges Total Minutes 25  -CS       Total Minutes 25   -            User Key  (r) = Recorded By, (t) = Taken By, (c) = Cosigned By    Initials Name Provider Type    CS Wilfrid Vasques, OT Occupational Therapist              Therapy Charges for Today     Code Description Service Date Service Provider Modifiers Qty    [id number]  OT SELF CARE/MGMT/TRAIN EA 15 MIN 7/5/2022 Wilfrid Vasques, OT GO 1    [id number]  OT THERAPEUTIC ACT EA 15 MIN 7/5/2022 Wilfrid Vasques OT GO 1               Wilfrid Vasques OT  7/5/2022

## 2022-07-05 NOTE — CONSULTS
Chart review for diabetes educator consult.    At the time of this review patient A1c is 5.3 , they have no noted history of diabetes and no home medications noted for treatment of diabetes. At this time we do not feel the patient would benefit from diabetes education. Thank you for this consult, should patient needs change please re consult us.

## 2022-07-05 NOTE — CASE MANAGEMENT/SOCIAL WORK
Case Management Discharge Note      Final Note:     I met with Ms. Jacob today at the bedside to discuss discharge planning. She is agreeable to [facility] for rehab. I spoke to Cathy with [facility] who advises they can accept Ms. Jacob tomorrow for acute rehab.         Nursing to call report [phone].     Case management to fax the discharge summary to [phone].     A [facility] wheelchair van is scheduled for Wednesday 7/6/22 at 11:30 AM.     Nursing to please have Ms. Jacob downstairs at the [address] [facility] entrance no later than 11:15 AM.          Selected Continued Care - Admitted Since 7/1/2022     Destination Coordination complete.    Service Provider Selected Services Address Phone Fax Patient Preferred    [facility]  Inpatient Rehabilitation [address] [facility] [address] [phone] --               Transportation Services  W/C Van: Other ([facility] wheelchair van)    Final Discharge Disposition Code: 62 - inpatient rehab facility

## 2022-07-06 VITALS
OXYGEN SATURATION: 95 % | HEART RATE: 78 BPM | SYSTOLIC BLOOD PRESSURE: 137 MMHG | WEIGHT: 123.02 LBS | HEIGHT: 61 IN | TEMPERATURE: 98.2 F | RESPIRATION RATE: 18 BRPM | DIASTOLIC BLOOD PRESSURE: 74 MMHG | BODY MASS INDEX: 23.23 KG/M2

## 2022-07-06 PROCEDURE — 99239 HOSP IP/OBS DSCHRG MGMT >30: CPT | Performed by: PHYSICIAN ASSISTANT

## 2022-07-06 PROCEDURE — 97110 THERAPEUTIC EXERCISES: CPT

## 2022-07-06 PROCEDURE — 97116 GAIT TRAINING THERAPY: CPT

## 2022-07-06 RX ORDER — ACETAMINOPHEN 325 MG/1
650 TABLET ORAL EVERY 4 HOURS PRN
Start: 2022-07-06

## 2022-07-06 RX ORDER — LOPERAMIDE HYDROCHLORIDE 2 MG/1
4 CAPSULE ORAL ONCE
Status: COMPLETED | OUTPATIENT
Start: 2022-07-06 | End: 2022-07-06

## 2022-07-06 RX ORDER — ASPIRIN 325 MG
325 TABLET ORAL DAILY
Start: 2022-07-07 | End: 2022-09-27

## 2022-07-06 RX ORDER — ATORVASTATIN CALCIUM 80 MG/1
80 TABLET, FILM COATED ORAL NIGHTLY
Qty: 90 TABLET
Start: 2022-07-06

## 2022-07-06 RX ADMIN — ASPIRIN 325 MG ORAL TABLET 325 MG: 325 PILL ORAL at 08:05

## 2022-07-06 RX ADMIN — LOPERAMIDE HYDROCHLORIDE 4 MG: 2 CAPSULE ORAL at 10:24

## 2022-07-06 RX ADMIN — Medication 10 ML: at 08:05

## 2022-07-06 NOTE — THERAPY TREATMENT NOTE
Patient Name: Yolie Jacob  : 1935    MRN: [id number]                              Today's Date: 2022       Admit Date: 2022    Visit Dx:     ICD-10-CM ICD-9-CM   1. Visual disturbance  H53.9 368.9   2. 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 Cognitive communication deficit  R41.841 799.52   4. Supraclinoid carotid artery aneurysm, small  I67.1 442.81     Patient Active Problem List   Diagnosis   • Visual disturbance   • Ataxia   • Paresthesia and pain of left extremity   • Headache   • Atrial fibrillation (HCC)   • Cardioembolic stroke (HCC)     Past Medical History:   Diagnosis Date   • A-fib (HCC)    • Arthritis    • Hypertension      Past Surgical History:   Procedure Laterality Date   • APPENDECTOMY     • BREAST SURGERY     • CATARACT EXTRACTION     •  SECTION     • EYE SURGERY     • HEMORRHOIDECTOMY     • INTRAOCULAR LENS INSERTION     • TONSILLECTOMY        General Information     Row Name 22 0935          Physical Therapy Time and Intention    Document Type therapy note (daily note)  -MB     Mode of Treatment physical therapy  -MB     Row Name 22 0935          General Information    Patient Profile Reviewed yes  -MB     Existing Precautions/Restrictions fall  LLE weakness  -MB     Barriers to Rehab medically complex  -MB     Row Name 22 0935          Cognition    Orientation Status (Cognition) oriented x 3  -MB     Row Name 2235          Safety Issues, Functional Mobility    Safety Issues Affecting Function (Mobility) insight into deficits/self-awareness;safety precaution awareness;sequencing abilities  -MB     Impairments Affecting Function (Mobility) balance;strength;sensation/sensory awareness;endurance/activity tolerance  -MB     Cognitive Impairments, Mobility Safety/Performance insight into deficits/self-awareness;safety precaution awareness;sequencing abilities  -MB           User Key  (r) = Recorded By, (t) =  Taken By, (c) = Cosigned By    Initials Name Provider Type    Anjali Kwan, PT Physical Therapist               Mobility     Row Name 07/06/22 0935          Bed Mobility    Supine-Sit [place] (Bed Mobility) supervision  -MB     Assistive Device (Bed Mobility) bed rails;head of bed elevated  -MB     Comment, (Bed Mobility) Pt. advanced to EOB w/out difficulty; denied dizziness throughout.  -MB     Row Name 07/06/22 0935          Transfers    Comment, (Transfers) STS from EOB and toilet (3x 2/2 episodes of diarrhea) w/ VCs for safe hand placement. No LOB or instability observed.  -MB     Row Name 07/06/22 0935          Sit-Stand Transfer    Sit-Stand [place] (Transfers) contact guard;verbal cues  -MB     Assistive Device (Sit-Stand Transfers) walker, front-wheeled  -MB     Row Name 07/06/22 0935          Gait/Stairs (Locomotion)    [place] Level (Gait) contact guard;verbal cues  -MB     Assistive Device (Gait) walker, front-wheeled  -MB     Distance in Feet (Gait) 160  -MB     Deviations/Abnormal Patterns (Gait) weight shifting decreased;gait speed decreased;yang decreased;stride length decreased  -MB     Bilateral Gait Deviations forward flexed posture;heel strike decreased  -MB     Left Sided Gait Deviations weight shift ability decreased;heel strike decreased  -MB     Comment, (Gait/Stairs) Pt. ambulated w/ step through gait mechanics w/ slow yang, dec LLE stance phase and heelstrike. Pt. required VCs for upright posture/forward gaze, safe use of RW (to keep closer to WARNER), increased B heelstrike, and assist to manage RW.  -MB           User Key  (r) = Recorded By, (t) = Taken By, (c) = Cosigned By    Initials Name Provider Type    Anjali Kwan, PT Physical Therapist               Obj/Interventions     Row Name 07/06/22 3186          Motor Skills    Therapeutic Exercise shoulder;hip;knee;ankle  -MB     Row Name 07/06/22 5121          Shoulder (Therapeutic Exercise)     Shoulder AROM (Therapeutic Exercise) bilateral;flexion;aBduction;15 repititions  -MB     Row Name 07/06/22 1335          Hip (Therapeutic Exercise)    Hip (Therapeutic Exercise) strengthening exercise  -MB     Hip Strengthening (Therapeutic Exercise) bilateral;marching while seated;aBduction;15 repititions  -MB     Row Name 07/06/22 1335          Knee (Therapeutic Exercise)    Knee (Therapeutic Exercise) strengthening exercise  -MB     Knee Strengthening (Therapeutic Exercise) bilateral;LAQ (long arc quad);15 repititions  -MB     Row Name 07/06/22 1335          Ankle (Therapeutic Exercise)    Ankle (Therapeutic Exercise) strengthening exercise  -MB     Ankle Strengthening (Therapeutic Exercise) bilateral;dorsiflexion;plantarflexion;15 repititions  -MB     Row Name 07/06/22 1335          Balance    Balance Assessment standing static balance;standing dynamic balance  -MB     Static Sitting Balance independent  -MB     Dynamic Sitting Balance independent  -MB     Position, Sitting Balance unsupported;sitting in chair;sitting edge of bed  -MB     Sit to Stand Dynamic Balance contact guard;verbal cues  -MB     Static Standing Balance supervision  -MB     Dynamic Standing Balance standby assist  -MB     Position/Device Used, Standing Balance walker, rolling;supported  -MB     Balance Interventions standing;sit to stand;dynamic reaching;occupation based/functional task;weight shifting activity  -MB     Comment, Balance Pt. stood at RW and completed independent post-toilet hygiene x 2 episodes diarrhea (RN notified), and stood at sink to complete ADLs. No LOB or instability,.  -MB           User Key  (r) = Recorded By, (t) = Taken By, (c) = Cosigned By    Initials Name Provider Type    Anjali Kwan, PT Physical Therapist               Goals/Plan     Row Name 07/06/22 2006          Bed Mobility Goal 1 (PT)    Activity/Assistive Device (Bed Mobility Goal 1, PT) bed mobility activities, all  -MB     [place]  Level/Cues Needed (Bed Mobility Goal 1, PT) contact guard required  -MB     Time Frame (Bed Mobility Goal 1, PT) long term goal (LTG);2 weeks  -MB     Progress/Outcomes (Bed Mobility Goal 1, PT) goal met  -MB     Row Name 07/06/22 1345          Transfer Goal 1 (PT)    Activity/Assistive Device (Transfer Goal 1, PT) sit-to-stand/stand-to-sit;bed-to-chair/chair-to-bed  -MB     [place] Level/Cues Needed (Transfer Goal 1, PT) contact guard required  -MB     Time Frame (Transfer Goal 1, PT) long term goal (LTG);2 weeks  -MB     Progress/Outcome (Transfer Goal 1, PT) goal met  -MB     Row Name 07/06/22 1345          Gait Training Goal 1 (PT)    Activity/Assistive Device (Gait Training Goal 1, PT) gait (walking locomotion);walker, rolling  -MB     [place] Level (Gait Training Goal 1, PT) contact guard required  -MB     Distance (Gait Training Goal 1, PT) 200 FEET  -MB     Time Frame (Gait Training Goal 1, PT) long term goal (LTG);2 weeks  -MB     Progress/Outcome (Gait Training Goal 1, PT) goal partially met  -MB           User Key  (r) = Recorded By, (t) = Taken By, (c) = Cosigned By    Initials Name Provider Type    Anjali Kwan, PT Physical Therapist               Clinical Impression     Row Name 07/06/22 1340          Pain    Pretreatment Pain Rating 0/10 - no pain  -MB     Posttreatment Pain Rating 0/10 - no pain  -MB     Row Name 07/06/22 1340          Pain Scale: FACES Pre/Post-Treatment    Pain: FACES Scale, Pretreatment 0-->no hurt  -MB     Posttreatment Pain Rating 0-->no hurt  -MB     Row Name 07/06/22 1340          Plan of Care Review    Plan of Care Reviewed With patient  -MB     Progress improving  -MB     Outcome Evaluation Patient pleasant, w/ good effort and motivation. She demonstrates improving independence w/ mobility. Patient completed multiple sit to stand transfers w/ CGA and progressed forward ambulation to 160ft w/ RW and CGA. Patient continues to require cueing for low vision  and safety. PT continues to recommend IP rehab at D/C.  -MB     Row Name 07/06/22 1340          Vital Signs    Pre Systolic BP Rehab --  VSS. RN consent for PT.  -MB     Pre Patient Position Supine  -MB     Intra Patient Position Standing  -MB     Post Patient Position Sitting  -MB     Row Name 07/06/22 1340          Positioning and Restraints    Post Treatment Position bathroom  -MB     Bathroom notified nsg;call light within reach;sitting;with other staff  w/ PCT  -MB           User Key  (r) = Recorded By, (t) = Taken By, (c) = Cosigned By    Initials Name Provider Type    Anjali Kwan, PT Physical Therapist               Outcome Measures     Row Name 07/06/22 1346 07/06/22 0743       How much help from another person do you currently need...    Turning from your back to your side while in flat bed without using bedrails? 4  -MB 4  -LM    Moving from lying on back to sitting on the side of a flat bed without bedrails? 3  -MB 3  -LM    Moving to and from a bed to a chair (including a wheelchair)? 3  -MB 3  -LM    Standing up from a chair using your arms (e.g., wheelchair, bedside chair)? 3  -MB 3  -LM    Climbing 3-5 steps with a railing? 3  -MB 3  -LM    To walk in hospital room? 3  -MB 3  -LM    AM-PAC 6 Clicks Score (PT) 19  -MB 19  -LM    Highest level of mobility 6 --> Walked 10 steps or more  -MB 6 --> Walked 10 steps or more  -LM          User Key  (r) = Recorded By, (t) = Taken By, (c) = Cosigned By    Initials Name Provider Type    Anjali Kwan, PT Physical Therapist    Akanksha Kramer, RN Registered Nurse                             Physical Therapy Education                 Title: PT OT SLP Therapies (Done)     Topic: Physical Therapy (Done)     Point: Mobility training (Done)     Learning Progress Summary           Patient Acceptance, FAUSTINO HANSON,NR by CD at 7/2/2022 6796    Comment: BENEFITS OF OOB ACTIVITY, SAFETY WITH MOBILITY, PROGRESSION OF POC, D/C PLANNING,   Family Acceptance,  E, VU,NR by CD at 7/2/2022 1708    Comment: BENEFITS OF OOB ACTIVITY, SAFETY WITH MOBILITY, PROGRESSION OF POC, D/C PLANNING,                   Point: Home exercise program (Done)     Learning Progress Summary           Patient Acceptance, E, VU,NR by CD at 7/2/2022 1708    Comment: BENEFITS OF OOB ACTIVITY, SAFETY WITH MOBILITY, PROGRESSION OF POC, D/C PLANNING,   Family Acceptance, E, VU,NR by CD at 7/2/2022 1708    Comment: BENEFITS OF OOB ACTIVITY, SAFETY WITH MOBILITY, PROGRESSION OF POC, D/C PLANNING,                   Point: Body mechanics (Done)     Learning Progress Summary           Patient Acceptance, E, VU,NR by CD at 7/2/2022 1708    Comment: BENEFITS OF OOB ACTIVITY, SAFETY WITH MOBILITY, PROGRESSION OF POC, D/C PLANNING,   Family Acceptance, E, VU,NR by CD at 7/2/2022 1708    Comment: BENEFITS OF OOB ACTIVITY, SAFETY WITH MOBILITY, PROGRESSION OF POC, D/C PLANNING,                   Point: Precautions (Done)     Learning Progress Summary           Patient Acceptance, E, VU,NR by CD at 7/2/2022 1708    Comment: BENEFITS OF OOB ACTIVITY, SAFETY WITH MOBILITY, PROGRESSION OF POC, D/C PLANNING,   Family Acceptance, E, VU,NR by CD at 7/2/2022 1708    Comment: BENEFITS OF OOB ACTIVITY, SAFETY WITH MOBILITY, PROGRESSION OF POC, D/C PLANNING,                               User Key     Initials Effective Dates Name Provider Type Discipline    CD 06/16/21 -  Selena Durand, PT Physical Therapist PT              PT Recommendation and Plan     Plan of Care Reviewed With: patient  Progress: improving  Outcome Evaluation: Patient pleasant, w/ good effort and motivation. She demonstrates improving independence w/ mobility. Patient completed multiple sit to stand transfers w/ CGA and progressed forward ambulation to 160ft w/ RW and CGA. Patient continues to require cueing for low vision and safety. PT continues to recommend IP rehab at D/C.     Time Calculation:    PT Charges     Row Name 07/06/22 6948              Time Calculation    Start Time 0935  -MB      PT Received On 07/07/22  -MB      PT Goal Re-Cert Due Date 07/12/22  -MB              Time Calculation- PT    Total Timed Code Minutes- PT 60 minute(s)  -MB              Timed Charges    74289 - PT Therapeutic Exercise Minutes 30  -MB      35766 - Gait Training Minutes  30  -MB              Total Minutes    Timed Charges Total Minutes 60  -MB       Total Minutes 60  -MB            User Key  (r) = Recorded By, (t) = Taken By, (c) = Cosigned By    Initials Name Provider Type    Anjali Kwan, PT Physical Therapist              Therapy Charges for Today     Code Description Service Date Service Provider Modifiers Qty    99475755157 HC PT THER PROC EA 15 MIN 7/6/2022 Anjali Neri, PT GP 2    97750464923 HC GAIT TRAINING EA 15 MIN 7/6/2022 Anjali Neri, PT GP 2          PT G-Codes  Outcome Measure Options: AM-PAC 6 Clicks Daily Activity (OT)  AM-PAC 6 Clicks Score (PT): 19  AM-PAC 6 Clicks Score (OT): 19  Modified [place] Scale: 4 - Moderately severe disability.  Unable to walk without assistance, and unable to attend to own bodily needs without assistance.    Anjali Neri, JUSTINE  7/6/2022

## 2022-07-06 NOTE — PLAN OF CARE
Goal Outcome Evaluation:      Pt A&Ox4, RA, VSS. Discharging to [facility]; report called to STEVEN Chau. Education complete, pt verbally accepted.

## 2022-07-06 NOTE — PLAN OF CARE
Goal Outcome Evaluation:  Plan of Care Reviewed With: patient        Progress: improving  Outcome Evaluation: Patient pleasant, w/ good effort and motivation. She demonstrates improving independence w/ mobility. Patient completed multiple sit to stand transfers w/ CGA and progressed forward ambulation to 160ft w/ RW and CGA. Patient continues to require cueing for low vision and safety. PT continues to recommend IP rehab at D/C.

## 2022-07-06 NOTE — DISCHARGE SUMMARY
[facility] Medicine Services  DISCHARGE SUMMARY    Patient Name: Yolie Jacob  : 1935  MRN: [id number]    Date of Admission: 2022  1:57 PM  Date of Discharge: 2022  Primary Care Physician: Provider, No Known    Hospital Course     Presenting Problem:   Visual disturbance [H53.9]    Active Hospital Problems    Diagnosis  POA   • **Cardioembolic stroke (HCC) [I63.9]  Yes   • Visual disturbance [H53.9]  Yes   • Ataxia [R27.0]  Yes   • Paresthesia and pain of left extremity [M79.609, R20.2]  Yes   • Headache [R51.9]  Yes   • Atrial fibrillation (HCC) [I48.91]  Yes      Resolved Hospital Problems   No resolved problems to display.      Hospital Course:  Ms. Yolie Jacob is an 87yoF with PMH significant for CAD, HLD, atrial fibrillation (Eliquis) and prior COVID-19 infection (2021). She presented to [facility] ED on 22 for evaluation of headache, blurry vision, gait instability, dizziness and generalized weakness x 3 days. She was evaluated at [facility] ED on 22 due to complaints of waxing and waning left-sided sensory deficits and intermittent confusion. She underwent CTA head and CTA head/neck. CT head reportedly unremarkable. CTA head/neck reportedly revealed a 9mm L MCA aneurysm and L M1 stenosis. She was reportedly told not to take her Eliquis due to aneurysm and was discharged home to follow up with Dr. Amato of neurosurgery.     At [facility] ED, she underwent repeat CTA head/neck which was concerning for R P2 occlusion. She was admitted to the hospital medicine service and stroke neurology team was consulted to follow.     Acute ischemic R PCA CVA, suspected cardioembolic source  Hyperlipidemia  - Appreciate stroke neurology evaluation  - Recommended ASA 325mg PO daily and high-intensity statin  - Eliquis has been transitioned to Xarelto to assist with medication adherence (had only been taking Eliquis once per day because she generally  "forgets to take morning dose)  - PT/OT/SLP followed. 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 L anterior cerebral and middle cerebral arteries are of normal caliber   - Have referred to neurointerventional service for outpatient follow up      Atrial fibrillation, rate controlled  - Transitioned to Xarelto this admission due to issues with compliance as above    Day of Discharge     HPI:   Sitting up in bed. Complains right-sided posterior neck pain as well as ongoing left-sided \"heaviness\" and right-sided headache. Wants to know how long these symptoms will last. We discussed that we are hopeful her symptoms will continue to improve but it is unclear what extent of residual damage she will have long-term. We discussed the transition to Xarelto. She says she got confused with her Eliquis at home - had been having issues with low blood pressure / fatigue and when she reduced her antihypertensive to once daily, she also reduced her Eliquis because she thought it would help her.     Review of Systems  Gen- No fevers, chills  CV- No chest pain, palpitations  Resp- No cough, dyspnea  GI- No N/V/D, abd pain    Vital Signs:   Temp:  [98.2 °F (36.8 °C)-98.6 °F (37 °C)] 98.2 °F (36.8 °C)  Heart Rate:  [75-95] 78  Resp:  [16-18] 18  BP: (131-152)/(74-94) 137/74    Physical Exam:  Constitutional: No acute distress, awake, alert and conversant. Sitting upright in bed  HENT: NCAT, mucous membranes moist. Hard of hearing. R posterior neck pain is reproducible with palpation   Respiratory: Clear to auscultation bilaterally, respiratory effort normal on room air   Cardiovascular: RRR, no murmurs, rubs, or gallops  Gastrointestinal: Positive bowel sounds, soft, nontender, nondistended  Musculoskeletal: No bilateral ankle edema  Psychiatric: Appropriate affect, cooperative  Neurologic: Oriented x 3, " moves all extremities spontaneously, speech clear and coherent     Pertinent  and/or Most Recent Results     LAB RESULTS:      Lab 07/04/22  0704 07/03/22  0752 07/01/22  1440   WBC 3.61 4.13 5.12   HEMOGLOBIN 14.2 14.3 15.3   HEMATOCRIT 43.0 44.0 44.2   PLATELETS 209 207 213   NEUTROS ABS 2.38 2.92 3.96   IMMATURE GRANS (ABS) 0.01 0.01 0.01   LYMPHS ABS 0.63* 0.65* 0.64*   MONOS ABS 0.44 0.43 0.43   EOS ABS 0.13 0.10 0.06   MCV 88.8 90.9 87.2         Lab 07/04/22  0704 07/03/22  0752 07/02/22  0726 07/01/22  1440   SODIUM 142 139  --  141   POTASSIUM 3.7 3.6  --  3.7   CHLORIDE 108* 108*  --  107   CO2 22.0 21.0*  --  24.0   ANION GAP 12.0 10.0  --  10.0   BUN 16 19  --  16   CREATININE 0.70 0.65  --  0.61   EGFR 83.8 85.3  --  86.7   GLUCOSE 95 96  --  102*   CALCIUM 9.2 9.2  --  9.1   MAGNESIUM  --   --   --  2.0   HEMOGLOBIN A1C  --   --  5.30  --    TSH  --   --   --  2.040         Lab 07/04/22  0704 07/03/22  0752 07/01/22  1440   TOTAL PROTEIN 5.5* 5.9* 6.4   ALBUMIN 3.70 3.50 4.20   GLOBULIN 1.8 2.4 2.2   ALT (SGPT) 10 11 14   AST (SGOT) 14 14 17   BILIRUBIN 0.5 0.5 0.6   ALK PHOS 89 90 102         Lab 07/01/22  1440   TROPONIN T 0.014         Lab 07/02/22  0726   CHOLESTEROL 242*   LDL CHOL 162*   HDL CHOL 41   TRIGLYCERIDES 208*         Lab 07/01/22  1440   FOLATE 19.70   VITAMIN B 12 267     Brief Urine Lab Results  (Last result in the past 365 days)      Color   Clarity   Blood   Leuk Est   Nitrite   Protein   CREAT   Urine HCG        07/01/22 1557 Yellow   Clear   Negative   Trace   Negative   Negative               Microbiology Results (last 10 days)     Procedure Component Value - Date/Time    COVID PRE-OP / PRE-PROCEDURE SCREENING ORDER (NO ISOLATION) - Swab, Nasopharynx [[id number]]  (Normal) Collected: 07/01/22 1557    Lab Status: Final result Specimen: Swab from Nasopharynx Updated: 07/01/22 161    Narrative:      The following orders were created for panel order COVID PRE-OP / PRE-PROCEDURE  SCREENING ORDER (NO ISOLATION) - Swab, Nasopharynx.  Procedure                               Abnormality         Status                     ---------                               -----------         ------                     COVID-19 and FLU A/B PCR...[[id number]]  Normal              Final result                 Please view results for these tests on the individual orders.    COVID-19 and FLU A/B PCR - Swab, Nasopharynx [[id number]]  (Normal) Collected: 07/01/22 1555    Lab Status: Final result Specimen: Swab from Nasopharynx Updated: 07/01/22 1617     COVID19 Not Detected     Influenza A PCR Not Detected     Influenza B PCR Not Detected    Narrative:      Fact sheet for providers: https://www.fda.gov/media/852543/download    Fact sheet for patients: https://www.fda.gov/media/162642/download    Test performed by PCR.        Adult Transthoracic Echo Complete W/ Cont if Necessary Per Protocol (With Agitated Saline)    Result Date: 7/2/2022  · Left ventricular wall thickness is consistent with mild concentric hypertrophy. · Normal left ventricular systolic function, estimated EF 55%. · Left ventricular diastolic function is consistent with (grade I) impaired relaxation. · Aortic sclerosis with mild aortic insufficiency, no evidence of aortic stenosis. · Mild mitral vegetation. · Trace to mild tricuspid regurgitation with normal RVSP.      CT Angiogram Neck    Result Date: 7/1/2022  CT ANGIOGRAM HEAD-, CT ANGIOGRAM NECK-  Date of Exam: 7/1/2022 3:33 PM  Indication: headache, h/o recently dxd aneurysm.  Comparison Exams: None available.  Technique:  Multiple axial images were obtained from the aortic arch through the vertex before and after the administration contrast for CT angiogram of the brain. Volumetric imaging was performed and multiplanar and 3-dimensional angiographic images were reformatted on an independent workstation. Automatic exposure intraoperative reconstruction methods were utilized to minimize  radiation dose.   FINDINGS:  CTA NECK: There is mild calcified plaque of the aortic arch.  Origins of the great vessels appear widely patent.  Right brachiocephalic artery is patent.  Visualized right subclavian artery is patent.  Right common carotid artery is patent without focal stenosis.  No significant plaque the right carotid bifurcation.  No significant stenosis of the right internal or external carotid arteries.  Left common carotid artery is patent without stenosis.  There is minimal calcified plaque the left carotid bifurcation.  No significant stenosis of the left internal or external carotid arteries.  The left subclavian artery appears patent.  Vertebral arteries are codominant and patent without definite stenosis.  Soft tissues of the neck appear within normal limits.  There is calcified granuloma within the medial right lung apex.  Visualized lung apices are otherwise clear.  There are degenerative changes of spine. There are no lytic or sclerotic bony lesions identified.  CTA HEAD: There is fusiform aneurysmal enlargement of the supraclinoid segment of the left internal carotid artery.  This measures up to 8 mm in maximum diameter.  The left middle cerebral artery and anterior cerebral arteries are of normal caliber.  There is irregularity of the left anterior cerebral artery consistent with intracranial atherosclerosis.  Right intracranial internal carotid artery is patent without focal stenosis or aneurysm.  Right anterior and middle cerebral arteries appear patent without focal stenosis.  Vertebral arteries are patent skull base.  Basilar artery is patent without focal stenosis or occlusion.  The bilateral superior cerebellar arteries appear patent.  There is fetal-type origin of the left posterior cerebral artery.  This arises from the aneurysmal segment of the left internal carotid artery.  There is irregularity of the distal branch of the left posterior cerebral artery compatible with  intracranial atherosclerosis.  There is occlusion of the P2 segment of the right posterior cerebral artery.  No posterior circulation aneurysm identified.  There is no pathologic intracranial contrast enhancement.  Globes and orbits appear within normal limits.  Visualized paranasal sinuses and mastoid air cells appear clear.       1.  No evidence of carotid or vertebral artery stenosis. 2.  Fusiform aneurysmal enlargement of the supraclinoid segment of the left internal carotid artery.  The left anterior cerebral and middle cerebral arteries are of normal caliber. 2.  There is mild irregularity of the left anterior cerebral artery and a distal branch of the left posterior cerebral artery consistent with intracranial atherosclerosis. 3.  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 Findings personally discussed with Dr. Riggins of the emergency department at 4:20 PM on 7/1/2022  This report was finalized on 7/1/2022 4:25 PM by Noman Varghese MD.      MRI Brain Without Contrast    Result Date: 7/2/2022  DATE OF EXAM: 7/2/2022 5:00 AM  PROCEDURE: MRI BRAIN WO CONTRAST-  INDICATIONS: Stroke, follow up  COMPARISON: CTA head and neck  TECHNIQUE: Multiplanar multisequence images of the brain were performed without contrast according to routine brain MRI protocol.  FINDINGS: On diffusion there are signal changes in the right thalamus the region of the splenium of the corpus callosum and right occipital lobe that could relate to a right posterior cerebral artery territory infarct. There are periventricular, and deep white matter changes as well as signal in the basal ganglia and thalamic areas that could reflect more chronic small vessel ischemic change. There is no definite orbital abnormality. The pituitary is not enlarged. On susceptibility imaging there is blooming in the thalamic areas, as well as the right frontal parietal region in addition to some areas within the more central cerebellar  region that may be reflective of hypertension. There is fusiform dilatation of the supraclinoid portion left ICA which is noted on CTA.      1.  Restricted diffusion in the territory of the right posterior cerebral artery. 2.  T2 signal changes involving both cerebral hemispheres occur reflect more chronic small vessel ischemic change. 3.  Susceptibility artifact within the thalamic areas and cerebellum that could be seen with hypertension. There additionally some susceptibility artifact in the right frontal parietal area. This also may relate to hypertension or could be seen with amyloid angiopathy.  This report was finalized on 7/2/2022 8:23 AM by Kailash Parker MD.      XR Chest 1 View    Result Date: 7/1/2022  DATE OF EXAM: 7/1/2022 2:04 PM  PROCEDURE: XR CHEST 1 VW-  INDICATIONS: Weak/Dizzy/AMS triage protocol  COMPARISON: No comparisons available.  TECHNIQUE: Single radiographic AP view of the chest was obtained.  FINDINGS: History indicates generalized weakness and dizziness. The heart shadow is in the upper range of normal size. Pulmonary vasculature appears normal. Lungs are well-expanded to mildly hyperinflated, and show a coarsened, mildly reticular interstitial disease pattern which may reflect mild senescent change, possibly changes of obstructive or reactive airways disease. There is a small area of linear scarring in the left lateral mid lung, but no other significant focal disease. No effusion or pneumothorax is seen.      Borderline heart shadow enlargement and mild chronic appearing interstitial changes. No clearly acute chest pathology is identified.  This report was finalized on 7/1/2022 2:20 PM by Dr. Mil Betancourt MD.      CT Angiogram Head    Result Date: 7/1/2022  CT ANGIOGRAM HEAD-, CT ANGIOGRAM NECK-  Date of Exam: 7/1/2022 3:33 PM  Indication: headache, h/o recently dxd aneurysm.  Comparison Exams: None available.  Technique:  Multiple axial images were obtained from the aortic arch through the  vertex before and after the administration contrast for CT angiogram of the brain. Volumetric imaging was performed and multiplanar and 3-dimensional angiographic images were reformatted on an independent workstation. Automatic exposure intraoperative reconstruction methods were utilized to minimize radiation dose.   FINDINGS:  CTA NECK: There is mild calcified plaque of the aortic arch.  Origins of the great vessels appear widely patent.  Right brachiocephalic artery is patent.  Visualized right subclavian artery is patent.  Right common carotid artery is patent without focal stenosis.  No significant plaque the right carotid bifurcation.  No significant stenosis of the right internal or external carotid arteries.  Left common carotid artery is patent without stenosis.  There is minimal calcified plaque the left carotid bifurcation.  No significant stenosis of the left internal or external carotid arteries.  The left subclavian artery appears patent.  Vertebral arteries are codominant and patent without definite stenosis.  Soft tissues of the neck appear within normal limits.  There is calcified granuloma within the medial right lung apex.  Visualized lung apices are otherwise clear.  There are degenerative changes of spine. There are no lytic or sclerotic bony lesions identified.  CTA HEAD: There is fusiform aneurysmal enlargement of the supraclinoid segment of the left internal carotid artery.  This measures up to 8 mm in maximum diameter.  The left middle cerebral artery and anterior cerebral arteries are of normal caliber.  There is irregularity of the left anterior cerebral artery consistent with intracranial atherosclerosis.  Right intracranial internal carotid artery is patent without focal stenosis or aneurysm.  Right anterior and middle cerebral arteries appear patent without focal stenosis.  Vertebral arteries are patent skull base.  Basilar artery is patent without focal stenosis or occlusion.  The  bilateral superior cerebellar arteries appear patent.  There is fetal-type origin of the left posterior cerebral artery.  This arises from the aneurysmal segment of the left internal carotid artery.  There is irregularity of the distal branch of the left posterior cerebral artery compatible with intracranial atherosclerosis.  There is occlusion of the P2 segment of the right posterior cerebral artery.  No posterior circulation aneurysm identified.  There is no pathologic intracranial contrast enhancement.  Globes and orbits appear within normal limits.  Visualized paranasal sinuses and mastoid air cells appear clear.       1.  No evidence of carotid or vertebral artery stenosis. 2.  Fusiform aneurysmal enlargement of the supraclinoid segment of the left internal carotid artery.  The left anterior cerebral and middle cerebral arteries are of normal caliber. 2.  There is mild irregularity of the left anterior cerebral artery and a distal branch of the left posterior cerebral artery consistent with intracranial atherosclerosis. 3.  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 Findings personally discussed with Dr. Riggins of the emergency department at 4:20 PM on 7/1/2022  This report was finalized on 7/1/2022 4:25 PM by Noman Varghese MD.      Results for orders placed during the hospital encounter of 07/01/22    Adult Transthoracic Echo Complete W/ Cont if Necessary Per Protocol (With Agitated Saline)    Interpretation Summary  · Left ventricular wall thickness is consistent with mild concentric hypertrophy.  · Normal left ventricular systolic function, estimated EF 55%.  · Left ventricular diastolic function is consistent with (grade I) impaired relaxation.  · Aortic sclerosis with mild aortic insufficiency, no evidence of aortic stenosis.  · Mild mitral vegetation.  · Trace to mild tricuspid regurgitation with normal RVSP.    Discharge Details        Discharge Medications      New Medications       Instructions Start Date   acetaminophen 325 MG tablet  Commonly known as: TYLENOL   650 mg, Oral, Every 4 Hours PRN      aspirin 325 MG tablet   325 mg, Oral, Daily   Start Date: July 7, 2022     atorvastatin 80 MG tablet  Commonly known as: LIPITOR   80 mg, Oral, Nightly      rivaroxaban 20 MG tablet  Commonly known as: XARELTO   20 mg, Oral, Daily With Dinner         Continue These Medications      Instructions Start Date   b complex-vitamin c-folic acid 0.8 MG tablet tablet   1 tablet, Oral, Daily      EYE VITAMINS PO   Oral         Stop These Medications    ELIQUIS PO     mupirocin 2 % ointment  Commonly known as: BACTROBAN     triamcinolone 0.1 % cream  Commonly known as: KENALOG          Allergies   Allergen Reactions   • Codeine Other (See Comments)     Passed out   • Morphine Itching   • Penicillins Itching   • Sulfa Antibiotics Hives   • Doxycycline Palpitations   • Keflex [Cephalexin] Palpitations     Discharge Disposition:  Skilled Nursing Facility (DC - External)    Diet:  Diet Order   Procedures   • Diet Regular; Cardiac     Activity:  Activity Instructions     Activity as Tolerated      Up WIth Assist          CODE STATUS:    Code Status and Medical Interventions:   Ordered at: 07/01/22 1829     Level Of Support Discussed With:    Patient     Code Status (Patient has no pulse and is not breathing):    CPR (Attempt to Resuscitate)     Medical Interventions (Patient has pulse or is breathing):    Full Support     No future appointments.    Additional Instructions for the Follow-ups that You Need to Schedule     Discharge Follow-up with Specified Provider: Stroke neurology in 4-6 weeks   As directed      To: Stroke neurology in 4-6 weeks             Carol Orta PA-C  07/06/22    Time Spent on Discharge:  I spent 35 minutes on this discharge activity which included: face-to-face encounter with the patient, reviewing the data in the system, coordination of the care with the nursing staff as  well as consultants, documentation, and entering orders.

## 2022-08-02 ENCOUNTER — TELEPHONE (OUTPATIENT)
Dept: NEUROLOGY | Facility: CLINIC | Age: 87
End: 2022-08-02

## 2022-08-19 ENCOUNTER — OFFICE VISIT (OUTPATIENT)
Dept: NEUROSURGERY | Facility: CLINIC | Age: 87
End: 2022-08-19

## 2022-08-19 VITALS
DIASTOLIC BLOOD PRESSURE: 86 MMHG | BODY MASS INDEX: 24.51 KG/M2 | SYSTOLIC BLOOD PRESSURE: 158 MMHG | RESPIRATION RATE: 16 BRPM | HEIGHT: 61 IN | WEIGHT: 129.8 LBS | HEART RATE: 77 BPM

## 2022-08-19 DIAGNOSIS — I67.1 INTRACRANIAL ANEURYSM: Primary | ICD-10-CM

## 2022-08-19 PROCEDURE — 99204 OFFICE O/P NEW MOD 45 MIN: CPT | Performed by: NEUROLOGICAL SURGERY

## 2022-08-19 RX ORDER — ASPIRIN 325 MG
TABLET ORAL
COMMUNITY
End: 2022-09-27 | Stop reason: SDUPTHER

## 2022-08-19 NOTE — PROGRESS NOTES
"Subjective     Chief Complaint: Intracranial aneurysm    Patient ID: Yolie Jacob is a 87 y.o. female seen for consultation today at the request of  Kvng Gore DO    History of Present Illness    This is an 87-year-old woman who was treated and released at [facility] in July for a vascular event.  She underwent a CTA which demonstrated an incidentally discovered left supraclinoid fusiform aneurysm.  Referral to my clinic was accordingly established.    She denies any headaches, nausea, vomiting, photophobia, or meningismus.  She does not have a family history of intracranial aneurysms.  She does not smoke and does not have uncontrolled high blood pressure.  She denies alcohol use.    The following portions of the patient's history were reviewed and updated as appropriate: allergies, current medications, past family history, past medical history, past social history, past surgical history and problem list.    Family history: History reviewed. No pertinent family history.    Social history:   Social History     Socioeconomic History   • Marital status:    Tobacco Use   • Smoking status: Never Smoker   • Smokeless tobacco: Never Used   Vaping Use   • Vaping Use: Never used   Substance and Sexual Activity   • Alcohol use: No   • Drug use: No   • Sexual activity: Defer       Review of Systems    Objective   Blood pressure 158/86, pulse 77, resp. rate 16, height 154.9 cm (61\"), weight 58.9 kg (129 lb 12.8 oz).  Body mass index is 24.53 kg/m².    Physical Exam  Constitutional:       General: She is not in acute distress.     Appearance: She is well-developed. She is not diaphoretic.   HENT:      Head: Normocephalic and atraumatic.   Pulmonary:      Effort: Pulmonary effort is normal.   Skin:     General: Skin is warm and dry.   Neurological:      Mental Status: She is alert and oriented to person, place, and time.      Cranial Nerves: No cranial nerve deficit.         Assessment & Plan "     Independent Review of Radiographic Studies:      Available for my review is a CTA of the head which was performed on July 1, 2022.  There is a fusiform dilation of the supraclinoid segment of the left internal carotid artery.  I do not appreciate any saccular or berry aneurysms.    Medical Decision Making:      Given her age and medical comorbidities, I think the risk benefit of treatment of this fusiform aneurysm would favor observation.  I do not think she would be a candidate for any sort of surgical intervention so I do not think surveillance imaging is indicated.  I did review the signs and symptoms of subarachnoid hemorrhage with the patient.  I would be happy to see her on an as-needed basis moving forward.  There is no role for surgical intervention or continued neurosurgical surveillance at this point.    Diagnoses and all orders for this visit:    1. Intracranial aneurysm (Primary)        No follow-ups on file.           This document signed by BERNADETTE Amato MD August 19, 2022 12:09 EDT

## 2022-08-22 DIAGNOSIS — I67.1 INTRACRANIAL ANEURYSM: Primary | ICD-10-CM

## 2022-09-27 ENCOUNTER — OFFICE VISIT (OUTPATIENT)
Dept: NEUROLOGY | Facility: CLINIC | Age: 87
End: 2022-09-27

## 2022-09-27 VITALS
SYSTOLIC BLOOD PRESSURE: 154 MMHG | WEIGHT: 126.8 LBS | HEART RATE: 77 BPM | TEMPERATURE: 98.4 F | OXYGEN SATURATION: 97 % | BODY MASS INDEX: 23.96 KG/M2 | DIASTOLIC BLOOD PRESSURE: 96 MMHG

## 2022-09-27 DIAGNOSIS — I48.91 ATRIAL FIBRILLATION, UNSPECIFIED TYPE: ICD-10-CM

## 2022-09-27 DIAGNOSIS — H53.9 VISUAL DISTURBANCE: ICD-10-CM

## 2022-09-27 DIAGNOSIS — Z86.73 HISTORY OF STROKE: Primary | ICD-10-CM

## 2022-09-27 PROCEDURE — 99214 OFFICE O/P EST MOD 30 MIN: CPT | Performed by: CLINICAL NURSE SPECIALIST

## 2022-09-27 NOTE — PROGRESS NOTES
New Patient Office Visit      Encounter Date: 2022   Patient Name: Yolie Jacob  : 1935   MRN: [id number]   PCP: Marin Castañeda DO    Referring Provider: No ref. provider found     Chief Complaint:    Chief Complaint   Patient presents with   • Follow-up   • Stroke       History of Present Illness: Yolie Jacob is a 87 y.o. female who is here today to establish care and follow-up from hospitalization at [facility] on  where she was noted to have a right PCA infarct.  Patient's known medical diagnoses at that time included macular degeneration, hypertension, and A. fib for which she was taking her Eliquis incorrectly at 5 mg only once a day.  Patient presented to [facility] with left-sided sensory deficit, confusion and visual disturbance.  Originally patient was seen at [facility] the day before were CTA head and neck were performed and a left supraclinoid ICA aneurysm was noted.  Apparently the next day the patient's symptoms worsened and she started having visual changes and she presented to [facility] where an MRI did show a right PCA infarct.  Upon discharge from [facility] her anticoagulation was changed from Eliquis to daily Xarelto to ensure compliance.  She was discharged to [facility] where she stayed for a couple weeks.  She presents to clinic accompanied by her son who is very anxious.  She tells me she was unable to afford Xarelto and has gone back to Eliquis, she tells me she is taking it twice a day.  She continues to have problems with her vision she tells me she can see better in the daylight, she is still unable to read.  She has not yet followed up with an ophthalmologist.  She is asking about driving.    Stroke Risk Factors: atrial fibrillation, hyperlipidemia and hypertension      Subjective      Review of Systems:   Review of Systems   Eyes: Positive for blurred vision and visual disturbance.   All other systems reviewed and are  negative.      Past Medical History:   Past Medical History:   Diagnosis Date   • A-fib (HCC)    • Arthritis    • Hypertension    • Stroke (HCC)        Past Surgical History:   Past Surgical History:   Procedure Laterality Date   • APPENDECTOMY     • BREAST SURGERY     • CATARACT EXTRACTION     •  SECTION     • EYE SURGERY     • HEMORRHOIDECTOMY     • INTRAOCULAR LENS INSERTION     • TONSILLECTOMY         Family History: History reviewed. No pertinent family history.    Social History:   Social History     Socioeconomic History   • Marital status:    Tobacco Use   • Smoking status: Never Smoker   • Smokeless tobacco: Never Used   Vaping Use   • Vaping Use: Never used   Substance and Sexual Activity   • Alcohol use: No   • Drug use: No   • Sexual activity: Defer       Medications:     Current Outpatient Medications:   •  acetaminophen (TYLENOL) 325 MG tablet, Take 2 tablets by mouth Every 4 (Four) Hours As Needed for Mild Pain ., Disp: , Rfl:   •  apixaban (ELIQUIS) 2.5 MG tablet tablet, Eliquis 2.5 mg tablet  Two times a day, Disp: , Rfl:   •  b complex-vitamin c-folic acid (NEPHRO-IRMA) 0.8 MG tablet tablet, Take 1 tablet by mouth Daily., Disp: , Rfl:   •  Multiple Vitamins-Minerals (EYE VITAMINS PO), Take  by mouth., Disp: , Rfl:   •  atorvastatin (LIPITOR) 80 MG tablet, Take 1 tablet by mouth Every Night., Disp: 90 tablet, Rfl:     Allergies:   Allergies   Allergen Reactions   • Procaine Anaphylaxis   • Codeine Other (See Comments)     Passed out   • Latex Itching   • Morphine Itching   • Penicillins Itching   • Sulfa Antibiotics Hives   • Doxycycline Palpitations   • Keflex [Cephalexin] Palpitations       Objective     Physical Exam:  Vital Signs:   Vitals:    22 1311   BP: 154/96   Pulse: 77   Temp: 98.4 °F (36.9 °C)   SpO2: 97%   Weight: 57.5 kg (126 lb 12.8 oz)     Body mass index is 23.96 kg/m².     Physical Exam  Vitals reviewed.   Constitutional:       Appearance: Normal appearance.    HENT:      Head: Normocephalic.      Mouth/Throat:      Mouth: Mucous membranes are moist.   Eyes:      Extraocular Movements: Extraocular movements intact.   Pulmonary:      Effort: Pulmonary effort is normal.   Skin:     General: Skin is warm and dry.   Neurological:      Cranial Nerves: Cranial nerve deficit present.      Deep Tendon Reflexes: Strength normal.      Reflex Scores:       Bicep reflexes are 1+ on the right side and 1+ on the left side.       Brachioradialis reflexes are 1+ on the right side and 1+ on the left side.       Patellar reflexes are 1+ on the right side and 1+ on the left side.  Psychiatric:         Mood and Affect: Mood normal.         Speech: Speech normal.         Neurological Exam  Mental Status  Awake, alert and oriented to person, place and time. Speech is normal. Language is fluent with no aphasia. Fund of knowledge is appropriate for level of education.    Cranial Nerves  CN II: Left inferior quadrantanopsia.  CN III, IV, VI: Extraocular movements intact bilaterally.  CN V: Facial sensation is normal.  CN VII: Full and symmetric facial movement.  CN VIII: Hearing appears intact.  CN IX, X: Palate elevates symmetrically  CN XI: Shoulder shrug strength is normal.  CN XII: Tongue midline without atrophy or fasciculations.    Motor   Strength is 5/5 throughout all four extremities.    Sensory  Light touch is normal in upper and lower extremities.     Reflexes                                            Right                      Left  Brachioradialis                    1+                         1+  Biceps                                 1+                         1+  Patellar                                1+                         1+  Plantar                           Downgoing                Upgoing  Deep tendon reflexes: Patient's left great toe is upgoing at baseline due to previous bunion surgery.    Coordination    No obvious dysmetria.    Gait  Casual gait is normal including  stance, stride, and arm swing.       Imaging Reviewed: 7/2022-CTA head neck showing aneurysmal enlargement of the supraclinoid left ICA, as well as an occlusion of the right PCA  MRI brain shows a right PCA infarct  TTE EF of 55%, left atrial size normal    Laboratory Results: 7/2022-hemoglobin 14.2, hematocrit 43.0, platelets 206, hemoglobin A1c 5.30, , AST 14, ALT 10    Modified Garrard Score     MODIFIED TALIB SCALE (to be assessed for each patient having history of stroke) []Stroke history but not assessed  []0: No symptoms at all  []1: No significant disability despite symptoms  [x]2: Slight disability  []3: Moderate disability  []4: Moderately severe disability  []5: Severe disability  []6: Death        PHQ-9 Depression Screening  Little interest or pleasure in doing things? 0-->not at all   Feeling down, depressed, or hopeless? 1-->several days (see can't see)   Trouble falling or staying asleep, or sleeping too much?     Feeling tired or having little energy?     Poor appetite or overeating?     Feeling bad about yourself - or that you are a failure or have let yourself or your family down?     Trouble concentrating on things, such as reading the newspaper or watching television?     Moving or speaking so slowly that other people could have noticed? Or the opposite - being so fidgety or restless that you have been moving around a lot more than usual?     Thoughts that you would be better off dead, or of hurting yourself in some way?     PHQ-9 Total Score 1   If you checked off any problems, how difficult have these problems made it for you to do your work, take care of things at home, or get along with other people?       NIH Stroke Scale    1a  Level of consciousness: 0=alert; keenly responsive   1b. LOC questions:  0=Answers both questions correctly    1c. LOC commands: 0=Performs both tasks correctly   2.  Best Gaze: 0=normal   3. Visual: 1=Partial hemianopia   4. Facial Palsy: 0=Normal symmetric  movement   5a. Motor left arm: 0=No drift, limb holds 90 (or 45) degrees for full 10 seconds   5b.  Motor right arm: 0=No drift, limb holds 90 (or 45) degrees for full 10 seconds   6a. Motor left le=No drift, limb holds 90 (or 45) degrees for full 10 seconds   6b  Motor right le=No drift, limb holds 90 (or 45) degrees for full 10 seconds   7. Limb Ataxia: 0=Absent   8.  Sensory: 0=Normal; no sensory loss   9. Best Language:  0=No aphasia, normal   10. Dysarthria: 0=Normal   11. Extinction and Inattention: 0=No abnormality    Total:   1        Assessment / Plan      Assessment/Plan:   Diagnoses and all orders for this visit:    1. History of stroke (Primary)  -Patient should continue Eliquis 5 mg twice daily for stroke prevention  -Patient was taken off aspirin by her cardiologist due to being on Eliquis  -Continue outpatient therapy    2. Atrial fibrillation, unspecified type (HCC)  -Encourage compliance with Eliquis 5 mg twice daily for secondary stroke prevention    3. Visual disturbance  -     Ambulatory Referral to Ophthalmology-for visual mapping and released to drive       Plan as above was discussed with the patient and her son.  All questions answered.  Reviewed signs and symptoms of stroke and when to call 911.  We will see patient back in about 3 months or sooner if needed.    Follow Up:   Return in about 3 months (around 2022).    MEGHAN Rosenthal  [facility]

## 2022-09-29 ENCOUNTER — TELEPHONE (OUTPATIENT)
Dept: NEUROLOGY | Facility: CLINIC | Age: 87
End: 2022-09-29

## 2022-09-29 NOTE — TELEPHONE ENCOUNTER
JOSE stating she has an appointment on 10/7/22 at 2:00PM with Carolann Montesinos at [facility]. If she has any questions to please call the office.

## 2022-12-27 ENCOUNTER — TELEPHONE (OUTPATIENT)
Dept: NEUROLOGY | Facility: CLINIC | Age: 87
End: 2022-12-27

## 2022-12-29 ENCOUNTER — TELEPHONE (OUTPATIENT)
Dept: NEUROLOGY | Facility: CLINIC | Age: 87
End: 2022-12-29

## 2022-12-29 NOTE — TELEPHONE ENCOUNTER
PT WAS SCHEDULED IN CLINIC TODAY 12/29/22 BUT WAS LATE AND HAD TO BE RESCHEDULED. PATIENT SAID AT HER LAST APPOINTMENT IT WAS DISCUSSED SHE SHOULD USE EYE DROP FOR HER MACULAR DEGENERATION BUT WAS NEVER NOTIFIED FROM HER PHARMACY. PATIENT WOULD LIKE SOME DROPS TO BE PRESCRIBED.

## 2022-12-29 NOTE — TELEPHONE ENCOUNTER
I spoke to the patient to inform her that we received a copy of the ophthalmology note and it states that they recommend Clear Eyes prn OU and taking Sarita Eyes.  Patient states that she will pick Clear Eyes up today and is already taking Sarita Eyes.

## 2022-12-29 NOTE — TELEPHONE ENCOUNTER
Pt was referred to Dr. Adela Motnesinos's office at her first visit in our clinic.  I called Dr. Montesinos's office requesting copies of the office visits to be sent to us.   The office will fax three office notes to us.

## 2023-04-05 NOTE — PLAN OF CARE
Problem: Adult Inpatient Plan of Care  Goal: Plan of Care Review  Recent Flowsheet Documentation  Taken 7/5/2022 1111 by Wilfrid Vasques OT  Progress: improving  Plan of Care Reviewed With: patient  Outcome Evaluation: Pt has progressed to CGA/SBA for bed mobility, transfers, and functional distance(s) w/ RW. Continues to demonstrate mild LLE weakness and dynamic balance deficit. SBA for toileting tasks and supervision for sinkside grooming. CGA for targeted/dynamic reaching activities. Significant visual deficit, recommend driving eval. Will cont IPOT per POC as tolerated. Rec d/c to short IP rehab stay.      Statement Selected

## 2023-04-28 ENCOUNTER — TELEPHONE (OUTPATIENT)
Dept: NEUROLOGY | Facility: CLINIC | Age: 88
End: 2023-04-28
Payer: MEDICARE

## 2023-04-28 NOTE — TELEPHONE ENCOUNTER
Caller: Yolie Jacob    Relationship: Self    Best call back number: [phone]    What was the call regarding: PT RECEIVED LETTER IN THE MAIL STATING SHE NEEDED TO CALL TO RESCHEDULE HER F/U APPT THAT WAS MARKED AS A NO SHOW ON 4/24/23. PT STATES SHE CONTACTED DAYS BEFORE THE APPT AND WAS ADVISED THAT THE PROVIDER WOULD NOT BE AVAILABLE THAT DAY; AND SHE WOULD BE CONTACTED SOON TO RESCHEDULE THE APPT ONCE THE SCHEDULE WAS AVAILABLE. I ADVISED PT THAT IF THAT IS WHAT OCCURRED, SHE CAN DISREGARD THE LETTER.    SENDING ENCOUNTER AS A REMINDER REGARDING RESCHEDULING PT'S F/U APPT.    PLEASE REVIEW AND ADVISE.

## 2023-05-01 NOTE — TELEPHONE ENCOUNTER
LVM FOR PT TO CALL [phone] TO R/S TO 5/4/23 IF  PT IS AVAILABLE AND TO APOLOGIZE FOR PRIOR SCHEDULING MIXUP.

## 2024-05-03 ENCOUNTER — APPOINTMENT (OUTPATIENT)
Dept: GENERAL RADIOLOGY | Facility: HOSPITAL | Age: 89
End: 2024-05-03
Payer: MEDICARE

## 2024-05-03 ENCOUNTER — HOSPITAL ENCOUNTER (EMERGENCY)
Facility: HOSPITAL | Age: 89
Discharge: HOME | End: 2024-05-03
Attending: EMERGENCY MEDICINE
Payer: MEDICARE

## 2024-05-03 VITALS
SYSTOLIC BLOOD PRESSURE: 167 MMHG | WEIGHT: 140 LBS | TEMPERATURE: 98.5 F | HEIGHT: 61 IN | DIASTOLIC BLOOD PRESSURE: 101 MMHG | RESPIRATION RATE: 20 BRPM | HEART RATE: 90 BPM | BODY MASS INDEX: 26.43 KG/M2 | OXYGEN SATURATION: 97 %

## 2024-05-03 DIAGNOSIS — S80.12XA TRAUMATIC HEMATOMA OF LEFT LOWER LEG, INITIAL ENCOUNTER: Primary | ICD-10-CM

## 2024-05-03 DIAGNOSIS — S76.012A HIP STRAIN, LEFT, INITIAL ENCOUNTER: ICD-10-CM

## 2024-05-03 PROCEDURE — 73590 X-RAY EXAM OF LOWER LEG: CPT

## 2024-05-03 PROCEDURE — 73630 X-RAY EXAM OF FOOT: CPT

## 2024-05-03 PROCEDURE — 99283 EMERGENCY DEPT VISIT LOW MDM: CPT

## 2024-05-03 PROCEDURE — 73502 X-RAY EXAM HIP UNI 2-3 VIEWS: CPT

## 2024-05-03 RX ORDER — HYDROCODONE BITARTRATE AND ACETAMINOPHEN 5; 325 MG/1; MG/1
1 TABLET ORAL ONCE
Status: COMPLETED | OUTPATIENT
Start: 2024-05-03 | End: 2024-05-03

## 2024-05-03 RX ADMIN — HYDROCODONE BITARTRATE AND ACETAMINOPHEN 1 TABLET: 5; 325 TABLET ORAL at 21:11

## 2024-05-04 NOTE — ED PROVIDER NOTES
Subjective   History of Present Illness    Pt presents after a fall. She was walking on the steps outside her home.  They were a little damp and she slipped, striking her left shin on the stair.  She has pain primarily in the left shin but also some mild left groin pain with movement and some right lower leg soreness just above the ankle.  She denies hitting her head. She denies pain to the head, neck, back, arms or torso.  She has a wound on her left leg.  She says last tetanus booster was about 4 years ago.    History provided by:  Patient      Review of Systems   Cardiovascular:  Negative for chest pain.   Gastrointestinal:  Negative for abdominal pain.   Musculoskeletal:  Negative for back pain and neck pain.   Skin:  Positive for wound.   All other systems reviewed and are negative.      Past Medical History:   Diagnosis Date    A-fib     Arthritis     Hypertension     Stroke        Allergies   Allergen Reactions    Procaine Anaphylaxis    Codeine Other (See Comments)     Passed out    Latex Itching    Morphine Itching    Penicillins Itching    Sulfa Antibiotics Hives    Doxycycline Palpitations    Keflex [Cephalexin] Palpitations       Past Surgical History:   Procedure Laterality Date    APPENDECTOMY      BREAST SURGERY      CATARACT EXTRACTION       SECTION      EYE SURGERY      HEMORRHOIDECTOMY      INTRAOCULAR LENS INSERTION      TONSILLECTOMY         No family history on file.    Social History     Socioeconomic History    Marital status:    Tobacco Use    Smoking status: Never    Smokeless tobacco: Never   Vaping Use    Vaping status: Never Used   Substance and Sexual Activity    Alcohol use: No    Drug use: No    Sexual activity: Defer           Objective   Physical Exam  Vitals and nursing note reviewed.   Constitutional:       General: She is not in acute distress.     Appearance: Normal appearance. She is not ill-appearing.   HENT:      Head: Normocephalic and atraumatic.   Eyes:       General: No scleral icterus.        Right eye: No discharge.         Left eye: No discharge.      Conjunctiva/sclera: Conjunctivae normal.   Cardiovascular:      Rate and Rhythm: Normal rate.   Pulmonary:      Effort: Pulmonary effort is normal. No respiratory distress.   Musculoskeletal:         General: No swelling or deformity.      Cervical back: Normal range of motion and neck supple.      Comments: Left hip mild pain with ROM but intact ROM. No tenderness. No shortening or rotation.    Left knee normal ROM, no tenderness or deformity.    Left shin has an abrasion and skin tear about half-way down medially.  There is bruising over the proximal-mid shin with apparent subcutaneous hematoma.    The dorsum of the left foot has abrasions.  No tenderness or deformity.  There is post-operative deformity of the distal foot/great toe she relates to prior bunionectomy.    The right lower leg has mild tenderness distally and posteriorly with no swelling, deformity.  The right knee ankle and foot are intact and normal.    Except as documented there is no tenderness to gross palpation of the arms or legs, and intact painless ROM to the shoulders, elbows, wrists, hips, knees and ankles.   Skin:     General: Skin is dry.      Findings: No rash.   Neurological:      General: No focal deficit present.      Mental Status: She is alert and oriented to person, place, and time. Mental status is at baseline.   Psychiatric:         Mood and Affect: Mood normal.         Behavior: Behavior normal.         Thought Content: Thought content normal.         Procedures           ED Course    XRs negative.    Patient stable on serial rechecks.  Discussed findings, concerns, plan of care, expected course, reasons to return and followup.  Provided the opportunity to ask questions.    She will be sent out with a walker for stability.                                 KELLI reviewed by Noman Whitten MD       Medical Decision  Making  Problems Addressed:  Hip strain, left, initial encounter: complicated acute illness or injury  Traumatic hematoma of left lower leg, initial encounter: complicated acute illness or injury    Amount and/or Complexity of Data Reviewed  Radiology: ordered and independent interpretation performed. Decision-making details documented in ED Course.     Details: Tib fib XR: negative, read by me  Foot XR: negative, read by me  Hip XR: negative, read by me    Risk  Prescription drug management.        Final diagnoses:   Traumatic hematoma of left lower leg, initial encounter   Hip strain, left, initial encounter       ED Disposition  ED Disposition       ED Disposition   Discharge    Condition   Stable    Comment   --               Marin Castañeda, DO  [address] [address]  [phone]    In 1 week           Medication List      No changes were made to your prescriptions during this visit.            Noman Whitten MD  05/04/24 [id number]

## 2024-05-05 ENCOUNTER — HOSPITAL ENCOUNTER (EMERGENCY)
Facility: HOSPITAL | Age: 89
Discharge: HOME | End: 2024-05-05
Attending: EMERGENCY MEDICINE | Admitting: EMERGENCY MEDICINE
Payer: MEDICARE

## 2024-05-05 ENCOUNTER — APPOINTMENT (OUTPATIENT)
Dept: GENERAL RADIOLOGY | Facility: HOSPITAL | Age: 89
End: 2024-05-05
Payer: MEDICARE

## 2024-05-05 ENCOUNTER — APPOINTMENT (OUTPATIENT)
Dept: CT IMAGING | Facility: HOSPITAL | Age: 89
End: 2024-05-05
Payer: MEDICARE

## 2024-05-05 VITALS
HEIGHT: 61 IN | RESPIRATION RATE: 20 BRPM | HEART RATE: 86 BPM | WEIGHT: 138.89 LBS | SYSTOLIC BLOOD PRESSURE: 172 MMHG | TEMPERATURE: 98.9 F | OXYGEN SATURATION: 98 % | BODY MASS INDEX: 26.22 KG/M2 | DIASTOLIC BLOOD PRESSURE: 98 MMHG

## 2024-05-05 DIAGNOSIS — S83.92XA SPRAIN OF LEFT KNEE, UNSPECIFIED LIGAMENT, INITIAL ENCOUNTER: ICD-10-CM

## 2024-05-05 DIAGNOSIS — S39.012A STRAIN OF MUSCLE, FASCIA AND TENDON OF LOWER BACK, INITIAL ENCOUNTER: Primary | ICD-10-CM

## 2024-05-05 LAB
ALBUMIN SERPL-MCNC: 3.8 G/DL (ref 3.5–5.2)
ALBUMIN/GLOB SERPL: 1.6 G/DL
ALP SERPL-CCNC: 94 U/L (ref 39–117)
ALT SERPL W P-5'-P-CCNC: 25 U/L (ref 1–33)
ANION GAP SERPL CALCULATED.3IONS-SCNC: 10 MMOL/L (ref 5–15)
AST SERPL-CCNC: 27 U/L (ref 1–32)
BACTERIA UR QL AUTO: ABNORMAL /HPF
BASOPHILS # BLD AUTO: 0.02 10*3/MM3 (ref 0–0.2)
BASOPHILS NFR BLD AUTO: 0.4 % (ref 0–1.5)
BILIRUB SERPL-MCNC: 0.4 MG/DL (ref 0–1.2)
BILIRUB UR QL STRIP: NEGATIVE
BUN SERPL-MCNC: 12 MG/DL (ref 8–23)
BUN/CREAT SERPL: 19.7 (ref 7–25)
CALCIUM SPEC-SCNC: 8.8 MG/DL (ref 8.6–10.5)
CHLORIDE SERPL-SCNC: 110 MMOL/L (ref 98–107)
CLARITY UR: CLEAR
CO2 SERPL-SCNC: 24 MMOL/L (ref 22–29)
COLOR UR: YELLOW
CREAT SERPL-MCNC: 0.61 MG/DL (ref 0.57–1)
D-LACTATE SERPL-SCNC: 1.2 MMOL/L (ref 0.5–2)
DEPRECATED RDW RBC AUTO: 45 FL (ref 37–54)
EGFRCR SERPLBLD CKD-EPI 2021: 86.1 ML/MIN/1.73
EOSINOPHIL # BLD AUTO: 0.1 10*3/MM3 (ref 0–0.4)
EOSINOPHIL NFR BLD AUTO: 2.2 % (ref 0.3–6.2)
ERYTHROCYTE [DISTWIDTH] IN BLOOD BY AUTOMATED COUNT: 13.8 % (ref 12.3–15.4)
FLUAV RNA RESP QL NAA+PROBE: NOT DETECTED
FLUBV RNA RESP QL NAA+PROBE: NOT DETECTED
GLOBULIN UR ELPH-MCNC: 2.4 GM/DL
GLUCOSE SERPL-MCNC: 92 MG/DL (ref 65–99)
GLUCOSE UR STRIP-MCNC: NEGATIVE MG/DL
HCT VFR BLD AUTO: 43.3 % (ref 34–46.6)
HGB BLD-MCNC: 13.9 G/DL (ref 12–15.9)
HGB UR QL STRIP.AUTO: NEGATIVE
HYALINE CASTS UR QL AUTO: ABNORMAL /LPF
IMM GRANULOCYTES # BLD AUTO: 0.01 10*3/MM3 (ref 0–0.05)
IMM GRANULOCYTES NFR BLD AUTO: 0.2 % (ref 0–0.5)
KETONES UR QL STRIP: NEGATIVE
LEUKOCYTE ESTERASE UR QL STRIP.AUTO: ABNORMAL
LYMPHOCYTES # BLD AUTO: 0.59 10*3/MM3 (ref 0.7–3.1)
LYMPHOCYTES NFR BLD AUTO: 12.8 % (ref 19.6–45.3)
MCH RBC QN AUTO: 28.7 PG (ref 26.6–33)
MCHC RBC AUTO-ENTMCNC: 32.1 G/DL (ref 31.5–35.7)
MCV RBC AUTO: 89.5 FL (ref 79–97)
MONOCYTES # BLD AUTO: 0.39 10*3/MM3 (ref 0.1–0.9)
MONOCYTES NFR BLD AUTO: 8.5 % (ref 5–12)
NEUTROPHILS NFR BLD AUTO: 3.49 10*3/MM3 (ref 1.7–7)
NEUTROPHILS NFR BLD AUTO: 75.9 % (ref 42.7–76)
NITRITE UR QL STRIP: NEGATIVE
NRBC BLD AUTO-RTO: 0 /100 WBC (ref 0–0.2)
PH UR STRIP.AUTO: 6.5 [PH] (ref 5–8)
PLATELET # BLD AUTO: 220 10*3/MM3 (ref 140–450)
PMV BLD AUTO: 10.2 FL (ref 6–12)
POTASSIUM SERPL-SCNC: 3.7 MMOL/L (ref 3.5–5.2)
PROT SERPL-MCNC: 6.2 G/DL (ref 6–8.5)
PROT UR QL STRIP: NEGATIVE
QT INTERVAL: 342 MS
QTC INTERVAL: 376 MS
RBC # BLD AUTO: 4.84 10*6/MM3 (ref 3.77–5.28)
RBC # UR STRIP: ABNORMAL /HPF
REF LAB TEST METHOD: ABNORMAL
SARS-COV-2 RNA RESP QL NAA+PROBE: NOT DETECTED
SODIUM SERPL-SCNC: 144 MMOL/L (ref 136–145)
SP GR UR STRIP: 1.01 (ref 1–1.03)
SQUAMOUS #/AREA URNS HPF: ABNORMAL /HPF
UROBILINOGEN UR QL STRIP: ABNORMAL
WBC # UR STRIP: ABNORMAL /HPF
WBC NRBC COR # BLD AUTO: 4.6 10*3/MM3 (ref 3.4–10.8)

## 2024-05-05 PROCEDURE — 36415 COLL VENOUS BLD VENIPUNCTURE: CPT

## 2024-05-05 PROCEDURE — 85025 COMPLETE CBC W/AUTO DIFF WBC: CPT | Performed by: EMERGENCY MEDICINE

## 2024-05-05 PROCEDURE — 25810000003 SODIUM CHLORIDE 0.9 % SOLUTION: Performed by: EMERGENCY MEDICINE

## 2024-05-05 PROCEDURE — P9612 CATHETERIZE FOR URINE SPEC: HCPCS

## 2024-05-05 PROCEDURE — 93005 ELECTROCARDIOGRAM TRACING: CPT | Performed by: EMERGENCY MEDICINE

## 2024-05-05 PROCEDURE — 83605 ASSAY OF LACTIC ACID: CPT | Performed by: EMERGENCY MEDICINE

## 2024-05-05 PROCEDURE — 80053 COMPREHEN METABOLIC PANEL: CPT | Performed by: EMERGENCY MEDICINE

## 2024-05-05 PROCEDURE — 99284 EMERGENCY DEPT VISIT MOD MDM: CPT

## 2024-05-05 PROCEDURE — 71045 X-RAY EXAM CHEST 1 VIEW: CPT

## 2024-05-05 PROCEDURE — 81001 URINALYSIS AUTO W/SCOPE: CPT | Performed by: EMERGENCY MEDICINE

## 2024-05-05 PROCEDURE — 87636 SARSCOV2 & INF A&B AMP PRB: CPT | Performed by: EMERGENCY MEDICINE

## 2024-05-05 PROCEDURE — 73700 CT LOWER EXTREMITY W/O DYE: CPT

## 2024-05-05 PROCEDURE — 74176 CT ABD & PELVIS W/O CONTRAST: CPT

## 2024-05-05 RX ORDER — SODIUM CHLORIDE 0.9 % (FLUSH) 0.9 %
10 SYRINGE (ML) INJECTION AS NEEDED
Status: DISCONTINUED | OUTPATIENT
Start: 2024-05-05 | End: 2024-05-05 | Stop reason: HOSPADM

## 2024-05-05 RX ORDER — OXYCODONE HYDROCHLORIDE AND ACETAMINOPHEN 5; 325 MG/1; MG/1
1 TABLET ORAL EVERY 6 HOURS PRN
Qty: 12 TABLET | Refills: 0 | Status: SHIPPED | OUTPATIENT
Start: 2024-05-05 | End: 2024-05-05

## 2024-05-05 RX ADMIN — SODIUM CHLORIDE 1000 ML: 9 INJECTION, SOLUTION INTRAVENOUS at 14:48

## 2024-05-15 LAB
QT INTERVAL: 342 MS
QTC INTERVAL: 376 MS

## 2024-07-20 ENCOUNTER — APPOINTMENT (OUTPATIENT)
Dept: CT IMAGING | Facility: HOSPITAL | Age: 89
End: 2024-07-20
Payer: MEDICARE

## 2024-07-20 ENCOUNTER — APPOINTMENT (OUTPATIENT)
Dept: GENERAL RADIOLOGY | Facility: HOSPITAL | Age: 89
End: 2024-07-20
Payer: MEDICARE

## 2024-07-20 ENCOUNTER — HOSPITAL ENCOUNTER (INPATIENT)
Facility: HOSPITAL | Age: 89
LOS: 3 days | Discharge: NURSING, REHAB OR LONG-TERM CARE | End: 2024-07-24
Attending: EMERGENCY MEDICINE | Admitting: HOSPITALIST
Payer: MEDICARE

## 2024-07-20 DIAGNOSIS — R41.0 ACUTE CONFUSION: Primary | ICD-10-CM

## 2024-07-20 DIAGNOSIS — I63.9 ACUTE STROKE DUE TO ISCHEMIA: ICD-10-CM

## 2024-07-20 DIAGNOSIS — R47.81 SLURRED SPEECH: ICD-10-CM

## 2024-07-20 DIAGNOSIS — R47.01 APHASIA: ICD-10-CM

## 2024-07-20 DIAGNOSIS — I63.9 CEREBROVASCULAR ACCIDENT (CVA), UNSPECIFIED MECHANISM: ICD-10-CM

## 2024-07-20 LAB
BASOPHILS # BLD AUTO: 0.02 10*3/MM3 (ref 0–0.2)
BASOPHILS NFR BLD AUTO: 0.4 % (ref 0–1.5)
BUN BLDA-MCNC: 15 MG/DL (ref 8–26)
CA-I BLDA-SCNC: 1.16 MMOL/L (ref 1.2–1.32)
CHLORIDE BLDA-SCNC: 105 MMOL/L (ref 98–109)
CO2 BLDA-SCNC: 24 MMOL/L (ref 24–29)
CREAT BLDA-MCNC: 0.7 MG/DL (ref 0.6–1.3)
DEPRECATED RDW RBC AUTO: 44.6 FL (ref 37–54)
EGFRCR SERPLBLD CKD-EPI 2021: 82.8 ML/MIN/1.73
EOSINOPHIL # BLD AUTO: 0.12 10*3/MM3 (ref 0–0.4)
EOSINOPHIL NFR BLD AUTO: 2.2 % (ref 0.3–6.2)
ERYTHROCYTE [DISTWIDTH] IN BLOOD BY AUTOMATED COUNT: 13.8 % (ref 12.3–15.4)
GLUCOSE BLDC GLUCOMTR-MCNC: 126 MG/DL (ref 70–130)
HCT VFR BLD AUTO: 46.5 % (ref 34–46.6)
HCT VFR BLDA CALC: 46 % (ref 38–51)
HGB BLD-MCNC: 15.1 G/DL (ref 12–15.9)
HGB BLDA-MCNC: 15.6 G/DL (ref 12–17)
IMM GRANULOCYTES # BLD AUTO: 0.06 10*3/MM3 (ref 0–0.05)
IMM GRANULOCYTES NFR BLD AUTO: 1.1 % (ref 0–0.5)
INR PPP: 1.1 (ref 0.8–1.2)
LYMPHOCYTES # BLD AUTO: 0.72 10*3/MM3 (ref 0.7–3.1)
LYMPHOCYTES NFR BLD AUTO: 13.4 % (ref 19.6–45.3)
MCH RBC QN AUTO: 28.9 PG (ref 26.6–33)
MCHC RBC AUTO-ENTMCNC: 32.5 G/DL (ref 31.5–35.7)
MCV RBC AUTO: 88.9 FL (ref 79–97)
MONOCYTES # BLD AUTO: 0.48 10*3/MM3 (ref 0.1–0.9)
MONOCYTES NFR BLD AUTO: 8.9 % (ref 5–12)
NEUTROPHILS NFR BLD AUTO: 3.97 10*3/MM3 (ref 1.7–7)
NEUTROPHILS NFR BLD AUTO: 74 % (ref 42.7–76)
NRBC BLD AUTO-RTO: 0 /100 WBC (ref 0–0.2)
PLATELET # BLD AUTO: 235 10*3/MM3 (ref 140–450)
PMV BLD AUTO: 10.8 FL (ref 6–12)
POTASSIUM BLDA-SCNC: 3.5 MMOL/L (ref 3.5–4.9)
PROTHROMBIN TIME: 13.1 SECONDS (ref 12.8–15.2)
QT INTERVAL: 350 MS
QTC INTERVAL: 399 MS
RBC # BLD AUTO: 5.23 10*6/MM3 (ref 3.77–5.28)
SODIUM BLD-SCNC: 139 MMOL/L (ref 138–146)
TROPONIN T SERPL HS-MCNC: 13 NG/L
WBC NRBC COR # BLD AUTO: 5.37 10*3/MM3 (ref 3.4–10.8)

## 2024-07-20 PROCEDURE — 80047 BASIC METABLC PNL IONIZED CA: CPT

## 2024-07-20 PROCEDURE — 25510000001 IOPAMIDOL PER 1 ML: Performed by: EMERGENCY MEDICINE

## 2024-07-20 PROCEDURE — 71045 X-RAY EXAM CHEST 1 VIEW: CPT

## 2024-07-20 PROCEDURE — 87636 SARSCOV2 & INF A&B AMP PRB: CPT | Performed by: EMERGENCY MEDICINE

## 2024-07-20 PROCEDURE — 84484 ASSAY OF TROPONIN QUANT: CPT | Performed by: EMERGENCY MEDICINE

## 2024-07-20 PROCEDURE — 70450 CT HEAD/BRAIN W/O DYE: CPT

## 2024-07-20 PROCEDURE — 99223 1ST HOSP IP/OBS HIGH 75: CPT | Performed by: NURSE PRACTITIONER

## 2024-07-20 PROCEDURE — 93005 ELECTROCARDIOGRAM TRACING: CPT | Performed by: EMERGENCY MEDICINE

## 2024-07-20 PROCEDURE — 0042T HC CT CEREBRAL PERFUSION W/WO CONTRAST: CPT

## 2024-07-20 PROCEDURE — 99285 EMERGENCY DEPT VISIT HI MDM: CPT

## 2024-07-20 PROCEDURE — 85610 PROTHROMBIN TIME: CPT

## 2024-07-20 PROCEDURE — 80053 COMPREHEN METABOLIC PANEL: CPT | Performed by: EMERGENCY MEDICINE

## 2024-07-20 PROCEDURE — 85025 COMPLETE CBC W/AUTO DIFF WBC: CPT | Performed by: EMERGENCY MEDICINE

## 2024-07-20 PROCEDURE — 70496 CT ANGIOGRAPHY HEAD: CPT

## 2024-07-20 PROCEDURE — 85014 HEMATOCRIT: CPT

## 2024-07-20 PROCEDURE — 70498 CT ANGIOGRAPHY NECK: CPT

## 2024-07-20 PROCEDURE — 36415 COLL VENOUS BLD VENIPUNCTURE: CPT

## 2024-07-20 RX ADMIN — IOPAMIDOL 115 ML: 755 INJECTION, SOLUTION INTRAVENOUS at 23:28

## 2024-07-20 NOTE — Clinical Note
Level of Care: Telemetry [5]   Diagnosis: CVA (cerebral vascular accident) [090810]   Admitting Physician: IGGY MASON [[id number]]   Certification: I Certify That Inpatient Hospital Services Are Medically Necessary For Greater Than 2 Midnights

## 2024-07-21 ENCOUNTER — APPOINTMENT (OUTPATIENT)
Dept: GENERAL RADIOLOGY | Facility: HOSPITAL | Age: 89
End: 2024-07-21
Payer: MEDICARE

## 2024-07-21 ENCOUNTER — APPOINTMENT (OUTPATIENT)
Dept: CARDIOLOGY | Facility: HOSPITAL | Age: 89
End: 2024-07-21
Payer: MEDICARE

## 2024-07-21 PROBLEM — I63.9 CVA (CEREBRAL VASCULAR ACCIDENT): Status: ACTIVE | Noted: 2024-07-21

## 2024-07-21 PROBLEM — Z86.73 H/O: CVA (CEREBROVASCULAR ACCIDENT): Status: ACTIVE | Noted: 2024-07-21

## 2024-07-21 LAB
ALBUMIN SERPL-MCNC: 3.9 G/DL (ref 3.5–5.2)
ALBUMIN/GLOB SERPL: 1.6 G/DL
ALP SERPL-CCNC: 105 U/L (ref 39–117)
ALT SERPL W P-5'-P-CCNC: 21 U/L (ref 1–33)
ANION GAP SERPL CALCULATED.3IONS-SCNC: 12 MMOL/L (ref 5–15)
APTT PPP: 26.7 SECONDS (ref 60–90)
ASCENDING AORTA: 4.2 CM
AST SERPL-CCNC: 26 U/L (ref 1–32)
BASOPHILS # BLD AUTO: 0.02 10*3/MM3 (ref 0–0.2)
BASOPHILS NFR BLD AUTO: 0.4 % (ref 0–1.5)
BH CV ECHO MEAS - AI P1/2T: 564.9 MSEC
BH CV ECHO MEAS - AO MAX PG: 17.1 MMHG
BH CV ECHO MEAS - AO MEAN PG: 10 MMHG
BH CV ECHO MEAS - AO ROOT DIAM: 3.6 CM
BH CV ECHO MEAS - AO V2 MAX: 207 CM/SEC
BH CV ECHO MEAS - AO V2 VTI: 37 CM
BH CV ECHO MEAS - AVA(I,D): 2.32 CM2
BH CV ECHO MEAS - EDV(CUBED): 79.5 ML
BH CV ECHO MEAS - EDV(MOD-SP2): 35 ML
BH CV ECHO MEAS - EDV(MOD-SP4): 55 ML
BH CV ECHO MEAS - EF(MOD-BP): 63.3 %
BH CV ECHO MEAS - EF(MOD-SP2): 62 %
BH CV ECHO MEAS - EF(MOD-SP4): 64.4 %
BH CV ECHO MEAS - ESV(CUBED): 19.7 ML
BH CV ECHO MEAS - ESV(MOD-SP2): 13.3 ML
BH CV ECHO MEAS - ESV(MOD-SP4): 19.6 ML
BH CV ECHO MEAS - FS: 37.2 %
BH CV ECHO MEAS - IVS/LVPW: 1 CM
BH CV ECHO MEAS - IVSD: 1.2 CM
BH CV ECHO MEAS - LA DIMENSION: 4.4 CM
BH CV ECHO MEAS - LAT PEAK E' VEL: 13.1 CM/SEC
BH CV ECHO MEAS - LV MASS(C)D: 184.7 GRAMS
BH CV ECHO MEAS - LV MAX PG: 6.8 MMHG
BH CV ECHO MEAS - LV MEAN PG: 4 MMHG
BH CV ECHO MEAS - LV V1 MAX: 130 CM/SEC
BH CV ECHO MEAS - LV V1 VTI: 27.3 CM
BH CV ECHO MEAS - LVIDD: 4.3 CM
BH CV ECHO MEAS - LVIDS: 2.7 CM
BH CV ECHO MEAS - LVOT AREA: 3.1 CM2
BH CV ECHO MEAS - LVOT DIAM: 2 CM
BH CV ECHO MEAS - LVPWD: 1.2 CM
BH CV ECHO MEAS - MED PEAK E' VEL: 7.8 CM/SEC
BH CV ECHO MEAS - MV A MAX VEL: 131 CM/SEC
BH CV ECHO MEAS - MV DEC SLOPE: 446 CM/SEC2
BH CV ECHO MEAS - MV DEC TIME: 0.18 SEC
BH CV ECHO MEAS - MV E MAX VEL: 72.6 CM/SEC
BH CV ECHO MEAS - MV E/A: 0.55
BH CV ECHO MEAS - MV MAX PG: 6.3 MMHG
BH CV ECHO MEAS - MV MEAN PG: 2 MMHG
BH CV ECHO MEAS - MV P1/2T: 66.3 MSEC
BH CV ECHO MEAS - MV V2 VTI: 25.2 CM
BH CV ECHO MEAS - MVA(P1/2T): 3.3 CM2
BH CV ECHO MEAS - MVA(VTI): 3.4 CM2
BH CV ECHO MEAS - PA ACC TIME: 0.07 SEC
BH CV ECHO MEAS - RAP SYSTOLE: 3 MMHG
BH CV ECHO MEAS - RVSP: 29.4 MMHG
BH CV ECHO MEAS - SV(LVOT): 85.8 ML
BH CV ECHO MEAS - SV(MOD-SP2): 21.7 ML
BH CV ECHO MEAS - SV(MOD-SP4): 35.4 ML
BH CV ECHO MEAS - TAPSE (>1.6): 1.65 CM
BH CV ECHO MEAS - TR MAX PG: 26.4 MMHG
BH CV ECHO MEAS - TR MAX VEL: 257 CM/SEC
BH CV ECHO MEASUREMENTS AVERAGE E/E' RATIO: 6.95
BH CV VAS BP RIGHT ARM: NORMAL MMHG
BH CV XLRA - RV BASE: 3.2 CM
BH CV XLRA - RV LENGTH: 6.1 CM
BH CV XLRA - RV MID: 2.4 CM
BH CV XLRA - TDI S': 19.4 CM/SEC
BILIRUB SERPL-MCNC: 0.4 MG/DL (ref 0–1.2)
BILIRUB UR QL STRIP: NEGATIVE
BUN SERPL-MCNC: 13 MG/DL (ref 8–23)
BUN/CREAT SERPL: 18.3 (ref 7–25)
CALCIUM SPEC-SCNC: 8.9 MG/DL (ref 8.6–10.5)
CHLORIDE SERPL-SCNC: 104 MMOL/L (ref 98–107)
CHOLEST SERPL-MCNC: 231 MG/DL (ref 0–200)
CLARITY UR: CLEAR
CO2 SERPL-SCNC: 22 MMOL/L (ref 22–29)
COLOR UR: YELLOW
CREAT SERPL-MCNC: 0.71 MG/DL (ref 0.57–1)
DEPRECATED RDW RBC AUTO: 43.2 FL (ref 37–54)
EGFRCR SERPLBLD CKD-EPI 2021: 81.4 ML/MIN/1.73
EOSINOPHIL # BLD AUTO: 0.07 10*3/MM3 (ref 0–0.4)
EOSINOPHIL NFR BLD AUTO: 1.4 % (ref 0.3–6.2)
ERYTHROCYTE [DISTWIDTH] IN BLOOD BY AUTOMATED COUNT: 13.8 % (ref 12.3–15.4)
FLUAV RNA RESP QL NAA+PROBE: NOT DETECTED
FLUBV RNA RESP QL NAA+PROBE: NOT DETECTED
GLOBULIN UR ELPH-MCNC: 2.5 GM/DL
GLUCOSE BLDC GLUCOMTR-MCNC: 104 MG/DL (ref 70–130)
GLUCOSE SERPL-MCNC: 131 MG/DL (ref 65–99)
GLUCOSE UR STRIP-MCNC: NEGATIVE MG/DL
HBA1C MFR BLD: 5.1 % (ref 4.8–5.6)
HCT VFR BLD AUTO: 41.1 % (ref 34–46.6)
HDLC SERPL-MCNC: 40 MG/DL (ref 40–60)
HGB BLD-MCNC: 14 G/DL (ref 12–15.9)
HGB UR QL STRIP.AUTO: NEGATIVE
IMM GRANULOCYTES # BLD AUTO: 0.01 10*3/MM3 (ref 0–0.05)
IMM GRANULOCYTES NFR BLD AUTO: 0.2 % (ref 0–0.5)
INR PPP: 1.02 (ref 0.89–1.12)
IVRT: 74 MS
KETONES UR QL STRIP: NEGATIVE
LDLC SERPL CALC-MCNC: 156 MG/DL (ref 0–100)
LDLC/HDLC SERPL: 3.84 {RATIO}
LEFT ATRIUM VOLUME INDEX: 27.1 ML/M2
LEFT ATRIUM VOLUME: 41.2 ML
LEUKOCYTE ESTERASE UR QL STRIP.AUTO: NEGATIVE
LYMPHOCYTES # BLD AUTO: 0.57 10*3/MM3 (ref 0.7–3.1)
LYMPHOCYTES NFR BLD AUTO: 11 % (ref 19.6–45.3)
MCH RBC QN AUTO: 29.2 PG (ref 26.6–33)
MCHC RBC AUTO-ENTMCNC: 34.1 G/DL (ref 31.5–35.7)
MCV RBC AUTO: 85.8 FL (ref 79–97)
MONOCYTES # BLD AUTO: 0.48 10*3/MM3 (ref 0.1–0.9)
MONOCYTES NFR BLD AUTO: 9.3 % (ref 5–12)
NEUTROPHILS NFR BLD AUTO: 4.01 10*3/MM3 (ref 1.7–7)
NEUTROPHILS NFR BLD AUTO: 77.7 % (ref 42.7–76)
NITRITE UR QL STRIP: NEGATIVE
NRBC BLD AUTO-RTO: 0 /100 WBC (ref 0–0.2)
PH UR STRIP.AUTO: 6.5 [PH] (ref 5–8)
PLATELET # BLD AUTO: 206 10*3/MM3 (ref 140–450)
PMV BLD AUTO: 10.2 FL (ref 6–12)
POTASSIUM SERPL-SCNC: 3.6 MMOL/L (ref 3.5–5.2)
PROT SERPL-MCNC: 6.4 G/DL (ref 6–8.5)
PROT UR QL STRIP: NEGATIVE
PROTHROMBIN TIME: 13.5 SECONDS (ref 12.2–14.5)
RBC # BLD AUTO: 4.79 10*6/MM3 (ref 3.77–5.28)
SARS-COV-2 RNA RESP QL NAA+PROBE: NOT DETECTED
SODIUM SERPL-SCNC: 138 MMOL/L (ref 136–145)
SP GR UR STRIP: 1.04 (ref 1–1.03)
TRIGL SERPL-MCNC: 188 MG/DL (ref 0–150)
UFH PPP CHRO-ACNC: 0.1 IU/ML (ref 0.3–0.7)
UFH PPP CHRO-ACNC: 0.1 IU/ML (ref 0.3–0.7)
UROBILINOGEN UR QL STRIP: ABNORMAL
VLDLC SERPL-MCNC: 35 MG/DL (ref 5–40)
WBC NRBC COR # BLD AUTO: 5.16 10*3/MM3 (ref 3.4–10.8)

## 2024-07-21 PROCEDURE — 83036 HEMOGLOBIN GLYCOSYLATED A1C: CPT | Performed by: NURSE PRACTITIONER

## 2024-07-21 PROCEDURE — 92523 SPEECH SOUND LANG COMPREHEN: CPT | Performed by: SPEECH-LANGUAGE PATHOLOGIST

## 2024-07-21 PROCEDURE — 97530 THERAPEUTIC ACTIVITIES: CPT

## 2024-07-21 PROCEDURE — 80061 LIPID PANEL: CPT | Performed by: NURSE PRACTITIONER

## 2024-07-21 PROCEDURE — 85025 COMPLETE CBC W/AUTO DIFF WBC: CPT

## 2024-07-21 PROCEDURE — 93306 TTE W/DOPPLER COMPLETE: CPT

## 2024-07-21 PROCEDURE — 85520 HEPARIN ASSAY: CPT

## 2024-07-21 PROCEDURE — 97162 PT EVAL MOD COMPLEX 30 MIN: CPT

## 2024-07-21 PROCEDURE — 74018 RADEX ABDOMEN 1 VIEW: CPT

## 2024-07-21 PROCEDURE — 81003 URINALYSIS AUTO W/O SCOPE: CPT | Performed by: EMERGENCY MEDICINE

## 2024-07-21 PROCEDURE — 25010000002 SULFUR HEXAFLUORIDE MICROSPH 60.7-25 MG RECONSTITUTED SUSPENSION: Performed by: NURSE PRACTITIONER

## 2024-07-21 PROCEDURE — 99233 SBSQ HOSP IP/OBS HIGH 50: CPT | Performed by: STUDENT IN AN ORGANIZED HEALTH CARE EDUCATION/TRAINING PROGRAM

## 2024-07-21 PROCEDURE — 99223 1ST HOSP IP/OBS HIGH 75: CPT | Performed by: FAMILY MEDICINE

## 2024-07-21 PROCEDURE — 97166 OT EVAL MOD COMPLEX 45 MIN: CPT

## 2024-07-21 PROCEDURE — 85610 PROTHROMBIN TIME: CPT

## 2024-07-21 PROCEDURE — 25010000002 HEPARIN (PORCINE) 25000-0.45 UT/250ML-% SOLUTION

## 2024-07-21 PROCEDURE — 85730 THROMBOPLASTIN TIME PARTIAL: CPT

## 2024-07-21 PROCEDURE — 93306 TTE W/DOPPLER COMPLETE: CPT | Performed by: INTERNAL MEDICINE

## 2024-07-21 PROCEDURE — 82948 REAGENT STRIP/BLOOD GLUCOSE: CPT

## 2024-07-21 RX ORDER — ACETAMINOPHEN 325 MG/1
650 TABLET ORAL EVERY 4 HOURS PRN
Status: DISCONTINUED | OUTPATIENT
Start: 2024-07-21 | End: 2024-07-24 | Stop reason: HOSPADM

## 2024-07-21 RX ORDER — ASPIRIN 81 MG/1
81 TABLET, CHEWABLE ORAL DAILY
Status: DISCONTINUED | OUTPATIENT
Start: 2024-07-21 | End: 2024-07-21

## 2024-07-21 RX ORDER — ATORVASTATIN CALCIUM 40 MG/1
80 TABLET, FILM COATED ORAL NIGHTLY
Status: DISCONTINUED | OUTPATIENT
Start: 2024-07-21 | End: 2024-07-24 | Stop reason: HOSPADM

## 2024-07-21 RX ORDER — SODIUM CHLORIDE 9 MG/ML
40 INJECTION, SOLUTION INTRAVENOUS AS NEEDED
Status: DISCONTINUED | OUTPATIENT
Start: 2024-07-21 | End: 2024-07-24 | Stop reason: HOSPADM

## 2024-07-21 RX ORDER — SODIUM CHLORIDE 0.9 % (FLUSH) 0.9 %
10 SYRINGE (ML) INJECTION EVERY 12 HOURS SCHEDULED
Status: DISCONTINUED | OUTPATIENT
Start: 2024-07-21 | End: 2024-07-24 | Stop reason: HOSPADM

## 2024-07-21 RX ORDER — ACETAMINOPHEN 650 MG/1
650 SUPPOSITORY RECTAL EVERY 4 HOURS PRN
Status: DISCONTINUED | OUTPATIENT
Start: 2024-07-21 | End: 2024-07-24 | Stop reason: HOSPADM

## 2024-07-21 RX ORDER — BISACODYL 10 MG
10 SUPPOSITORY, RECTAL RECTAL DAILY PRN
Status: DISCONTINUED | OUTPATIENT
Start: 2024-07-21 | End: 2024-07-24 | Stop reason: HOSPADM

## 2024-07-21 RX ORDER — ASPIRIN 300 MG/1
300 SUPPOSITORY RECTAL DAILY
Status: DISCONTINUED | OUTPATIENT
Start: 2024-07-21 | End: 2024-07-21

## 2024-07-21 RX ORDER — ACETAMINOPHEN 160 MG/5ML
650 SOLUTION ORAL EVERY 4 HOURS PRN
Status: DISCONTINUED | OUTPATIENT
Start: 2024-07-21 | End: 2024-07-24 | Stop reason: HOSPADM

## 2024-07-21 RX ORDER — BISACODYL 5 MG/1
5 TABLET, DELAYED RELEASE ORAL DAILY PRN
Status: DISCONTINUED | OUTPATIENT
Start: 2024-07-21 | End: 2024-07-24 | Stop reason: HOSPADM

## 2024-07-21 RX ORDER — ALUMINA, MAGNESIA, AND SIMETHICONE 2400; 2400; 240 MG/30ML; MG/30ML; MG/30ML
15 SUSPENSION ORAL EVERY 6 HOURS PRN
Status: DISCONTINUED | OUTPATIENT
Start: 2024-07-21 | End: 2024-07-24 | Stop reason: HOSPADM

## 2024-07-21 RX ORDER — POLYETHYLENE GLYCOL 3350 17 G/17G
17 POWDER, FOR SOLUTION ORAL DAILY PRN
Status: DISCONTINUED | OUTPATIENT
Start: 2024-07-21 | End: 2024-07-24 | Stop reason: HOSPADM

## 2024-07-21 RX ORDER — SODIUM CHLORIDE 0.9 % (FLUSH) 0.9 %
10 SYRINGE (ML) INJECTION AS NEEDED
Status: DISCONTINUED | OUTPATIENT
Start: 2024-07-21 | End: 2024-07-24 | Stop reason: HOSPADM

## 2024-07-21 RX ORDER — AMOXICILLIN 250 MG
2 CAPSULE ORAL 2 TIMES DAILY PRN
Status: DISCONTINUED | OUTPATIENT
Start: 2024-07-21 | End: 2024-07-24 | Stop reason: HOSPADM

## 2024-07-21 RX ORDER — ONDANSETRON 2 MG/ML
4 INJECTION INTRAMUSCULAR; INTRAVENOUS EVERY 6 HOURS PRN
Status: DISCONTINUED | OUTPATIENT
Start: 2024-07-21 | End: 2024-07-24 | Stop reason: HOSPADM

## 2024-07-21 RX ORDER — HEPARIN SODIUM 10000 [USP'U]/100ML
18 INJECTION, SOLUTION INTRAVENOUS
Status: DISCONTINUED | OUTPATIENT
Start: 2024-07-21 | End: 2024-07-23

## 2024-07-21 RX ADMIN — ATORVASTATIN CALCIUM 80 MG: 40 TABLET, FILM COATED ORAL at 20:11

## 2024-07-21 RX ADMIN — ASPIRIN 81 MG: 81 TABLET, CHEWABLE ORAL at 08:33

## 2024-07-21 RX ADMIN — Medication 10 ML: at 08:34

## 2024-07-21 RX ADMIN — HEPARIN SODIUM 12 UNITS/KG/HR: 10000 INJECTION, SOLUTION INTRAVENOUS at 15:17

## 2024-07-21 RX ADMIN — SULFUR HEXAFLUORIDE 3 ML: KIT at 10:21

## 2024-07-21 RX ADMIN — Medication 10 ML: at 20:11

## 2024-07-21 NOTE — ED PROVIDER NOTES
Subjective   History of Present Illness  89-year-old female brought in for evaluation of confusion, speech changes, and facial droop.  The son provides the history.  Patient herself is confused.  The son states that between midnight to 1 AM last night, almost 24 hours prior to presentation the patient had left lower facial droop and slurred speech.  This did not seem to resolve.  The patient slept until roughly 3 PM today and then upon awakening she was confused with change in speech and difficulty finding her words.  She did not have any facial droop or any other focal weakness with an the son evaluated again at 3 PM.  He has been trying to convince her all day to come to the ER.  She denies fever or infectious symptoms.  No chest pain.  No abdominal pain.  No change in bowel or urinary function.  No runny nose cough congestion sore throat.  No other medications.  She has a previous history of TIA and a previous history of strokes in the past.  Was prescribed Eliquis but when she had a previous stroke in the past it is because of Eliquis noncompliance.  There is suspicion that she may have left with medications currently because she manages her own medications.  No other acute complaints.      Review of Systems   Constitutional:  Negative for chills, fatigue and fever.   HENT:  Negative for congestion, ear pain, postnasal drip, sinus pressure and sore throat.    Eyes:  Negative for pain, redness and visual disturbance.   Respiratory:  Negative for cough, chest tightness and shortness of breath.    Cardiovascular:  Negative for chest pain, palpitations and leg swelling.   Gastrointestinal:  Negative for abdominal pain, anal bleeding, blood in stool, diarrhea, nausea and vomiting.   Endocrine: Negative for polydipsia and polyuria.   Genitourinary:  Negative for difficulty urinating, dysuria, frequency and urgency.   Musculoskeletal:  Negative for arthralgias, back pain and neck pain.   Skin:  Negative for pallor and  rash.   Allergic/Immunologic: Negative for environmental allergies and immunocompromised state.   Neurological:  Positive for speech difficulty and weakness. Negative for dizziness and headaches.   Hematological:  Negative for adenopathy.   Psychiatric/Behavioral:  Positive for confusion and decreased concentration. Negative for self-injury and suicidal ideas. The patient is not nervous/anxious.    All other systems reviewed and are negative.      Past Medical History:   Diagnosis Date    A-fib     Arthritis     H/O cardioembolic CVA 2024    Hypertension     Stroke        Allergies   Allergen Reactions    Procaine Anaphylaxis    Codeine Other (See Comments)     Passed out    Latex Itching    Morphine Itching    Penicillins Itching    Sulfa Antibiotics Hives    Doxycycline Palpitations    Keflex [Cephalexin] Palpitations       Past Surgical History:   Procedure Laterality Date    APPENDECTOMY      BREAST SURGERY      CATARACT EXTRACTION       SECTION      EYE SURGERY      HEMORRHOIDECTOMY      INTRAOCULAR LENS INSERTION      TONSILLECTOMY         No family history on file.    Social History     Socioeconomic History    Marital status:    Tobacco Use    Smoking status: Never    Smokeless tobacco: Never   Vaping Use    Vaping status: Never Used   Substance and Sexual Activity    Alcohol use: No    Drug use: No    Sexual activity: Defer           Objective   Physical Exam  Vitals and nursing note reviewed.   Constitutional:       General: She is not in acute distress.     Appearance: Normal appearance. She is well-developed. She is not toxic-appearing or diaphoretic.   HENT:      Head: Normocephalic and atraumatic.      Right Ear: External ear normal.      Left Ear: External ear normal.      Nose: Nose normal.   Eyes:      General: Lids are normal.      Pupils: Pupils are equal, round, and reactive to light.   Neck:      Trachea: No tracheal deviation.   Cardiovascular:      Rate and Rhythm: Normal  rate and regular rhythm.      Pulses: No decreased pulses.      Heart sounds: Normal heart sounds. No murmur heard.     No friction rub. No gallop.   Pulmonary:      Effort: Pulmonary effort is normal. No respiratory distress.      Breath sounds: Normal breath sounds. No decreased breath sounds, wheezing, rhonchi or rales.   Abdominal:      General: Bowel sounds are normal.      Palpations: Abdomen is soft.      Tenderness: There is no abdominal tenderness. There is no guarding or rebound.   Musculoskeletal:         General: No deformity. Normal range of motion.      Cervical back: Normal range of motion and neck supple.   Lymphadenopathy:      Cervical: No cervical adenopathy.   Skin:     General: Skin is warm and dry.      Findings: No rash.   Neurological:      Mental Status: She is alert. She is disoriented and confused.      GCS: GCS eye subscore is 4. GCS verbal subscore is 4. GCS motor subscore is 6.      Cranial Nerves: No cranial nerve deficit.      Sensory: No sensory deficit.      Comments: Mildly slurred speech.  Confusion.   Psychiatric:         Speech: Speech normal.         Behavior: Behavior normal.         Thought Content: Thought content normal.         Judgment: Judgment normal.         Procedures           ED Course  ED Course as of 07/21/24 0129   Sun Jul 21, 2024   0042 The patient presents with the son being the primary historian.  The son witnessed left lower facial droop and slurred speech between midnight to 1:00 last night.  He also reports some intermittent episodes of dizziness for the last couple days.  The patient slept until roughly 3:00 today which was the next time that the son evaluated the patient.  She appeared confused and continued to have speech abnormalities which per report were new for her.  CT imaging shows an acute left MCA infarct.  Based off last known well time she is not a tenecteplase candidate.  The infarct is very distal in nature and therefore the patient is not  an interventional candidate.  Stroke service is following.  She is supposed to take Eliquis but she had a stroke in the past secondary to medication noncompliance and it is suspected that she has had medication noncompliance with this episode since she manages her own medications. [NS]      ED Course User Index  [NS] Taryn Cabrera MD                          Total (NIH Stroke Scale): 4                  Medical Decision Making  Differential diagnosis includes TIA, stroke, intracranial hemorrhage, acute infectious process, dehydration, other unspecified etiology.    Vital signs show elevated blood pressure.  Otherwise no significant abnormalities.  Labs are overall nonrevealing.  CT scan of the head without contrastShows atrophy and chronic microvascular ischemic changes.  No acute intracranial process.  CT perfusion reports 70 mL of ischemia in the left carotid bulb distribution of the left middle cerebral artery.  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  P2 segment of the right posterior cerebral artery.  Occlusion of the distal left M4 branch of the middle cerebral artery.    Stroke navigator is following.    Due to last known well time and Eliquis use the patient is not a tenecteplase candidate.    The patient will be admitted for further stroke evaluation.    I discussed the patient with the hospitalist, Dr. Armstrong, who will consult to determine status of admission.    Problems Addressed:  Acute confusion: complicated acute illness or injury with systemic symptoms that poses a threat to life or bodily functions  Acute stroke due to ischemia: complicated acute illness or injury with systemic symptoms that poses a threat to life or bodily functions  Slurred speech: complicated acute illness or injury with systemic symptoms that poses a threat to life or bodily functions    Amount and/or Complexity of Data Reviewed  Independent  Historian: friend     Details: Son provides additional history.  External Data Reviewed: labs, radiology and ECG.  Labs: ordered. Decision-making details documented in ED Course.  Radiology: ordered and independent interpretation performed. Decision-making details documented in ED Course.  ECG/medicine tests: ordered and independent interpretation performed. Decision-making details documented in ED Course.     Details: EKG independently interpreted on self shows sinus rhythm with no acute ischemic changes.    Risk  Prescription drug management.  Decision regarding hospitalization.        Final diagnoses:   Acute confusion   Slurred speech   Acute stroke due to ischemia       ED Disposition  ED Disposition       ED Disposition   Decision to Admit    Condition   --    Comment   Level of Care: Telemetry [5]   Diagnosis: CVA (cerebral vascular accident) [549181]                 No follow-up provider specified.       Medication List      No changes were made to your prescriptions during this visit.            Taryn Cabrera MD  07/21/24 5545

## 2024-07-21 NOTE — PROGRESS NOTES
[facility]  ADMISSION FOLLOW-UP NOTE          Patient admitted after midnight, H&P by my partner performed earlier on today's date reviewed.  Interim findings, labs, and charting also reviewed.        [facility] Problem List has been managed and updated to include any new diagnoses:  Active Hospital Problems    Diagnosis  POA    **CVA (cerebral vascular accident) [I63.9]  Yes    H/O cardioembolic CVA [Z86.73]  Not Applicable    Atrial fibrillation [I48.91]  Yes      Resolved Hospital Problems   No resolved problems to display.         ADDITIONAL PLAN:  - detailed assessment and plan from admission reviewed    Acute left MCA CVA with expressive aphasia and R hemineglect   - Stroke neurology following   - MRI brain pending   - TTE pending   - Continue ASA and Lipitor   - Holding eliquis pending MRI results   - PT/OT/SLP, will need IPR      Hx of cardioembolic CVA  Hx of right PCA CVA   PAF  Hx of noncompliance with Eliquis       Expected Discharge   Expected Discharge Date: 7/23/2024; Expected Discharge Time:      Bassam Guerrero DO  07/21/24

## 2024-07-21 NOTE — THERAPY EVALUATION
Patient Name: Yolie Jacob  : 1935    MRN: [id number]                              Today's Date: 2024       Admit Date: 2024    Visit Dx:     ICD-10-CM ICD-9-CM   1. Acute confusion  R41.0 293.0   2. Slurred speech  R47.81 784.59   3. Acute stroke due to ischemia  I63.9 434.91     Patient Active Problem List   Diagnosis    Visual disturbance    Ataxia    Paresthesia and pain of left extremity    Headache    Atrial fibrillation    Cardioembolic stroke    CVA (cerebral vascular accident)    H/O cardioembolic CVA     Past Medical History:   Diagnosis Date    A-fib     Arthritis     H/O cardioembolic CVA 2024    Hypertension     Stroke      Past Surgical History:   Procedure Laterality Date    APPENDECTOMY      BREAST SURGERY      CATARACT EXTRACTION       SECTION      EYE SURGERY      HEMORRHOIDECTOMY      INTRAOCULAR LENS INSERTION      TONSILLECTOMY        General Information       Row Name 24          OT Time and Intention    Document Type evaluation  -CS     Mode of Treatment occupational therapy  -CS       Row Name 24          General Information    Patient Profile Reviewed yes  -CS     Prior Level of Function independent:;transfer;all household mobility;ADL's  Pt limited historian 2/2 aphasia. Per chart review, son reports Pt manages meds Independently. Pt able to verbalize she normally does not use AD. Full home/social history requires further inquiry.  -CS     Existing Precautions/Restrictions fall;other (see comments)   exp aphasia, low vision (R field cut, mac degeneration), R coordination deficit  -CS     Barriers to Rehab medically complex  -CS       Row Name 24          Living Environment    People in Home child(amy), adult  -CS       Row Name 24          Stairs Within Home, Primary    Stairs, Within Home, Primary Per chart review Pt lives on 2nd floor of home, full home/social history requires further inquiry   -CS       Row  Name 07/21/24 0901          Cognition    Orientation Status (Cognition) oriented to;person;verbal cues/prompts needed for orientation;place;other (see comments)  limited by exp aphasia  -CS       Row Name 07/21/24 0901          Safety Issues, Functional Mobility    Safety Issues Affecting Function (Mobility) awareness of need for assistance;insight into deficits/self-awareness;judgment;safety precautions follow-through/compliance;safety precaution awareness;problem-solving;sequencing abilities  -CS     Impairments Affecting Function (Mobility) balance;cognition;coordination;visual/perceptual  -CS     Cognitive Impairments, Mobility Safety/Performance insight into deficits/self-awareness;judgment;safety precaution awareness;safety precaution follow-through;sequencing abilities  -CS     Comment, Safety Issues/Impairments (Mobility) low vision (R > L), dynamic balanc deficit in standing  -CS               User Key  (r) = Recorded By, (t) = Taken By, (c) = Cosigned By      Initials Name Provider Type    CS Wilfrid Vasques OT Occupational Therapist                     Mobility/ADL's       Row Name 07/21/24 0908          Bed Mobility    Bed Mobility supine-sit;scooting/bridging  -CS     Scooting/Bridging [place] (Bed Mobility) standby assist  -CS     Supine-Sit [place] (Bed Mobility) contact guard  -     Assistive Device (Bed Mobility) head of bed elevated;bed rails  -CS     Comment, (Bed Mobility) appropriate sequencing intact, no dizziness reported in sitting  -CS       Row Name 07/21/24 0908          Transfers    Transfers sit-stand transfer;bed-chair transfer  -CS     Comment, (Transfers) [place] assist and guidance for R hand to RW, STS x 3 and steps to recliner, no overt LOB during transfers  -CS       Row Name 07/21/24 0908          Bed-Chair Transfer    Bed-Chair [place] (Transfers) contact guard  -       Row Name 07/21/24 0908          Sit-Stand Transfer    Sit-Stand [place] (Transfers)  contact guard  -CS       [facility] Name 07/21/24 0908          Functional Mobility    Functional Mobility- Comment defer to PT for specifics, no overt LOB during ADL related mobility with RWTammie for verbal cues and RW mngt for environment navigation  -CS       [facility] Name 07/21/24 0908          Activities of Daily Living    BADL Assessment/Intervention upper body dressing;lower body dressing;grooming;feeding  -CS       [facility] Name 07/21/24 0908          Upper Body Dressing Assessment/Training    [place] Level (Upper Body Dressing) don;pajama/robe;minimum assist (75% patient effort)  -CS     Position (Upper Body Dressing) edge of bed sitting  -CS     Comment, (Upper Body Dressing) difficulty targeting sleeves during threading 2/2 vision deficit  -CS       Row Name 07/21/24 0908          Lower Body Dressing Assessment/Training    [place] Level (Lower Body Dressing) don;socks;contact guard assist  -CS     Position (Lower Body Dressing) edge of bed sitting  -CS     Comment, (Lower Body Dressing) reach to distal BLEs intact, CGA for balance during adjustments  -CS       [facility] Name 07/21/24 0908          Grooming Assessment/Training    [place] Level (Grooming) wash face, hands;set up;hair care, combing/brushing;minimum assist (75% patient effort)  -CS     Position (Grooming) supported sitting  -CS       [facility] Name 07/21/24 0908          Self-Feeding Assessment/Training    [place] Level (Feeding) feeding skills;liquids to mouth;verbal cues  -CS     Comment, (Feeding) cues for drink placement (low vision)  -CS               User Key  (r) = Recorded By, (t) = Taken By, (c) = Cosigned By      Initials Name Provider Type    Wilfrid Loja OT Occupational Therapist                   Obj/Interventions       [facility] Name 07/21/24 0911          Sensory Assessment (Somatosensory)    Sensory Assessment (Somatosensory) bilateral UE  -CS     Bilateral UE Sensory Assessment general sensation;light touch awareness;intact;light  touch localization;proprioception;unable/difficult to assess  -     Sensory Assessment light touch and proprioception assessment limited by cognition  -       Row Name 07/21/24 0911          Vision Assessment/Intervention    Visual Impairment/Limitations visual/perceptual impairments present;peripheral vision impaired right;blurry vision;other (see comments)  -     Vision Assessment Comment baseline macular degeneration, significant R visual field loss, tracking L/R intact  -       Row Name 07/21/24 0911          Range of Motion Comprehensive    General Range of Motion no range of motion deficits identified  -       Row Name 07/21/24 0911          Strength Comprehensive (MMT)    General Manual Muscle Testing (MMT) Assessment upper extremity strength deficits identified  -     Comment, General Manual Muscle Testing (MMT) Assessment BUE grossly 4+/5, appropriate for age, no significant asymmetry observed  -       Row Name 07/21/24 0911          Motor Skills    Motor Skills coordination;functional endurance  -     Coordination bimanual skills;WFL;other (see comments)  observed during simple ADLs  -     Functional Endurance fair, stable on RA  -       Row Name 07/21/24 0911          Balance    Balance Assessment sitting static balance;sitting dynamic balance;standing static balance;standing dynamic balance  -     Static Sitting Balance supervision  -     Dynamic Sitting Balance contact guard  -CS     Position, Sitting Balance unsupported;sitting edge of bed  -     Static Standing Balance standby assist  -     Dynamic Standing Balance contact guard  -CS     Position/Device Used, Standing Balance supported;walker, front-wheeled  -     Balance Interventions sitting;standing;sit to stand;occupation based/functional task  -     Comment, Balance no overt LOB during ADLs, improved balance with RW  -               User Key  (r) = Recorded By, (t) = Taken By, (c) = Cosigned By      Initials Name  Provider Type    CS Wilfrid Vasques OT Occupational Therapist                   Goals/Plan       Row Name 07/21/24 0920          Transfer Goal 1 (OT)    Activity/Assistive Device (Transfer Goal 1, OT) bed-to-chair/chair-to-bed;toilet  -CS     [place] Level/Cues Needed (Transfer Goal 1, OT) modified independence  -CS     Time Frame (Transfer Goal 1, OT) short term goal (STG);5 days  -CS     Strategies/Barriers (Transfers Goal 1, OT) AD recs per PT  -CS     Progress/Outcome (Transfer Goal 1, OT) new goal  -CS       Row Name 07/21/24 0920          Dressing Goal 1 (OT)    Activity/Device (Dressing Goal 1, OT) lower body dressing  -CS     [place]/Cues Needed (Dressing Goal 1, OT) set-up required;verbal cues required  -CS     Time Frame (Dressing Goal 1, OT) long term goal (LTG);10 days  -CS     Progress/Outcome (Dressing Goal 1, OT) new goal  -CS       Row Name 07/21/24 0920          Grooming Goal 1 (OT)    Activity/Device (Grooming Goal 1, OT) hair care;oral care;wash face, hands  -CS     [place] (Grooming Goal 1, OT) standby assist;verbal cues required;set-up required  -CS     Time Frame (Grooming Goal 1, OT) long term goal (LTG);10 days  -CS     Strategies/Barriers (Grooming Goal 1, OT) sinkside, tool location in R visual field, safety  -CS     Progress/Outcome (Grooming Goal 1, OT) new goal  -CS       Row Name 07/21/24 0920          Therapy Assessment/Plan (OT)    Planned Therapy Interventions (OT) activity tolerance training;occupation/activity based interventions;functional balance retraining;ROM/therapeutic exercise;strengthening exercise;transfer/mobility retraining;patient/caregiver education/training;neuromuscular control/coordination retraining;adaptive equipment training  -CS               User Key  (r) = Recorded By, (t) = Taken By, (c) = Cosigned By      Initials Name Provider Type    CS Wilfrid Vasques OT Occupational Therapist                   Clinical Impression       Row Name  07/21/24 0913          Pain Assessment    Additional Documentation Pain Scale: FACES Pre/Post-Treatment (Group)  -CS       Row Name 07/21/24 0913          Pain Scale: FACES Pre/Post-Treatment    Pain: FACES Scale, Pretreatment 0-->no hurt  -CS     Posttreatment Pain Rating 0-->no hurt  -CS       Row Name 07/21/24 0913          Plan of Care Review    Plan of Care Reviewed With patient  -CS     Progress no change  -CS     Outcome Evaluation Baseline ADL completion limited by new R visual field and speech deficits alongside dynamic balance impairment warranting skilled IP OT services to promote return to PLOF. Tammie required for functional mobility for safe navigation and RW mngt, grossly Tammie for LB dressing. Rec d/c to IRF.  -CS       Row Name 07/21/24 0913          Therapy Assessment/Plan (OT)    Patient/Family Therapy Goal Statement (OT) difficulty stating 2/2 cognition  -CS     Rehab Potential (OT) good, to achieve stated therapy goals  -CS     Criteria for Skilled Therapeutic Interventions Met (OT) yes;meets criteria;skilled treatment is necessary  -CS     Therapy Frequency (OT) daily  -CS       Row Name 07/21/24 0913          Therapy Plan Review/Discharge Plan (OT)    Anticipated Discharge Disposition (OT) inpatient rehabilitation facility  -CS       Row Name 07/21/24 0913          Vital Signs    Pre Systolic BP Rehab 150  RN cleared for eval  -CS     Pre Treatment Diastolic BP 79  -CS     Post Systolic BP Rehab 184  -CS     Post Treatment Diastolic BP 99  -CS     O2 Delivery Pre Treatment room air  -CS     O2 Delivery Intra Treatment room air  -CS     O2 Delivery Post Treatment room air  -CS     Pre Patient Position Supine  -CS     Intra Patient Position Standing  -CS     Post Patient Position Sitting  -CS       Row Name 07/21/24 0913          Positioning and Restraints    Pre-Treatment Position in bed  -CS     Post Treatment Position chair  -CS     In Chair notified nsg;reclined;call light within  reach;encouraged to call for assist;sitting;exit alarm on;legs elevated;waffle cushion;with nsg  -               User Key  (r) = Recorded By, (t) = Taken By, (c) = Cosigned By      Initials Name Provider Type    CS Wilfrid Vasques OT Occupational Therapist                   Outcome Measures       Row Name 07/21/24 0921          How much help from another is currently needed...    Putting on and taking off regular lower body clothing? 2  -CS     Bathing (including washing, rinsing, and drying) 2  -CS     Toileting (which includes using toilet bed pan or urinal) 3  -CS     Putting on and taking off regular upper body clothing 3  -CS     Taking care of personal grooming (such as brushing teeth) 3  -CS     Eating meals 3  -CS     AM-PAC 6 Clicks Score (OT) 16  -       Row Name 07/21/24 0658          How much help from another person do you currently need...    Turning from your back to your side while in flat bed without using bedrails? 3  -JT     Moving from lying on back to sitting on the side of a flat bed without bedrails? 3  -JT     Moving to and from a bed to a chair (including a wheelchair)? 2  -JT     Standing up from a chair using your arms (e.g., wheelchair, bedside chair)? 2  -JT     Climbing 3-5 steps with a railing? 2  -JT     To walk in hospital room? 2  -JT     AM-PAC 6 Clicks Score (PT) 14  -JT     Highest Level of Mobility Goal 4 --> Transfer to chair/commode  -JT       Row Name 07/21/24 0921          Modified [place] Scale    Modified [place] Scale 3 - Moderate disability.  Requiring some help, but able to walk without assistance.  -       Row Name 07/21/24 0921          Functional Assessment    Outcome Measure Options AM-PAC 6 Clicks Daily Activity (OT);Modified [place]  -CS               User Key  (r) = Recorded By, (t) = Taken By, (c) = Cosigned By      Initials Name Provider Type    Wilfrid Loja, OT Occupational Therapist    Evelin Valencia RN Registered Nurse                     Occupational Therapy Education       Title: PT OT SLP Therapies (In Progress)       Topic: Occupational Therapy (In Progress)       Point: ADL training (Done)       Description:   Instruct learner(s) on proper safety adaptation and remediation techniques during self care or transfers.   Instruct in proper use of assistive devices.                  Learning Progress Summary             Patient Acceptance, E,D, VU,DU by  at 7/21/2024 0921                         Point: Home exercise program (Not Started)       Description:   Instruct learner(s) on appropriate technique for monitoring, assisting and/or progressing therapeutic exercises/activities.                  Learner Progress:  Not documented in this visit.              Point: Precautions (Done)       Description:   Instruct learner(s) on prescribed precautions during self-care and functional transfers.                  Learning Progress Summary             Patient Acceptance, E,D, VU,DU by  at 7/21/2024 0921                         Point: Body mechanics (Done)       Description:   Instruct learner(s) on proper positioning and spine alignment during self-care, functional mobility activities and/or exercises.                  Learning Progress Summary             Patient Acceptance, E,D, VU,DU by  at 7/21/2024 0921                                         User Key       Initials Effective Dates Name Provider Type Discipline     06/16/21 -  Wilfrid Vasques, OT Occupational Therapist OT                  OT Recommendation and Plan  Planned Therapy Interventions (OT): activity tolerance training, occupation/activity based interventions, functional balance retraining, ROM/therapeutic exercise, strengthening exercise, transfer/mobility retraining, patient/caregiver education/training, neuromuscular control/coordination retraining, adaptive equipment training  Therapy Frequency (OT): daily  Plan of Care Review  Plan of Care Reviewed With: patient  Progress:  no change  Outcome Evaluation: Baseline ADL completion limited by new R visual field and speech deficits alongside dynamic balance impairment warranting skilled IP OT services to promote return to PLOF. Tammie required for functional mobility for safe navigation and RW mngt, grossly Tammie for LB dressing. Rec d/c to IRF.     Time Calculation:   Evaluation Complexity (OT)  Review Occupational Profile/Medical/Therapy History Complexity: expanded/moderate complexity  Assessment, Occupational Performance/Identification of Deficit Complexity: 3-5 performance deficits  Clinical Decision Making Complexity (OT): detailed assessment/moderate complexity  Overall Complexity of Evaluation (OT): moderate complexity     Time Calculation- OT       Row Name 07/21/24 0922             Time Calculation- OT    OT Start Time 0737  -CS      OT Received On 07/21/24  -CS      OT Goal Re-Cert Due Date 07/31/24  -CS         Timed Charges    10200 - OT Therapeutic Activity Minutes 6  -CS      52171 - OT Self Care/Mgmt Minutes 4  -CS         Untimed Charges    OT Eval/Re-eval Minutes 47  -CS         Total Minutes    Timed Charges Total Minutes 10  -CS      Untimed Charges Total Minutes 47  -CS       Total Minutes 57  -CS                User Key  (r) = Recorded By, (t) = Taken By, (c) = Cosigned By      Initials Name Provider Type    CS Wilfrid Vasques, OT Occupational Therapist                  Therapy Charges for Today       Code Description Service Date Service Provider Modifiers Qty    [id number]  OT THERAPEUTIC ACT EA 15 MIN 7/21/2024 Wilfrid Vasques, OT GO 1    [id number]  OT EVAL MOD COMPLEXITY 4 7/21/2024 Wilfrid Vasques, OT GO 1                 Wilfrid Vasques OT  7/21/2024

## 2024-07-21 NOTE — PLAN OF CARE
Goal Outcome Evaluation:  Plan of Care Reviewed With: patient        Progress:  (initial eval)         Anticipated Discharge Disposition (SLP): inpatient rehabilitation facility    SLP Diagnosis: mild-moderate, aphasia (07/21/24 0731)

## 2024-07-21 NOTE — PLAN OF CARE
Goal Outcome Evaluation:  Plan of Care Reviewed With: patient        Progress: no change  Outcome Evaluation: Baseline ADL completion limited by new R visual field and speech deficits alongside dynamic balance impairment warranting skilled IP OT services to promote return to PLOF. Tammie required for functional mobility for safe navigation and RW mngt, grossly Tammie for LB dressing. Rec d/c to IRF.      Anticipated Discharge Disposition (OT): inpatient rehabilitation facility

## 2024-07-21 NOTE — H&P
[facility] Medicine Services  HISTORY AND PHYSICAL    Patient Name: Yolie Jacob  : 1935  MRN: [id number]  Primary Care Physician: Marin Castañeda DO  Date of admission: 2024      Subjective   Subjective     Chief Complaint:  Acute CVA    HPI:  Yolie Jacob is a 89 y.o. female with PAF and known previous history of cardioembolic CVA related to noncompliance with Eliquis, also has a history of previous ischemic right PCA territory CVA in .  Brought to [facility] ED for confusion.    Last known well 2024, in the early morning hours around 1 AM on 2024 was noted to have a left facial droop with some speech slurring by family member who lives in the home.  Upon seeing patient again today around 3 PM she no longer had a facial droop but was having ongoing speech difficulties as well as obvious confusion and inability to follow directions.      Remains confused in the emergency department with ongoing expressive dysphasia.    Personal History     Past Medical History:   Diagnosis Date    A-fib     Arthritis     H/O cardioembolic CVA 2024    Hypertension     Stroke            Past Surgical History:   Procedure Laterality Date    APPENDECTOMY      BREAST SURGERY      CATARACT EXTRACTION       SECTION      EYE SURGERY      HEMORRHOIDECTOMY      INTRAOCULAR LENS INSERTION      TONSILLECTOMY         Family History: family history is not on file.     Social History:  reports that she has never smoked. She has never used smokeless tobacco. She reports that she does not drink alcohol and does not use drugs.  Social History     Social History Narrative    Not on file       Medications:  Available home medication information reviewed.  Multiple Vitamins-Minerals, acetaminophen, apixaban, atorvastatin, and b complex-vitamin c-folic acid    Allergies   Allergen Reactions    Procaine Anaphylaxis    Codeine Other (See Comments)     Passed out     Latex Itching    Morphine Itching    Penicillins Itching    Sulfa Antibiotics Hives    Doxycycline Palpitations    Keflex [Cephalexin] Palpitations       Objective   Objective     Vital Signs:   Temp:  [98.7 °F (37.1 °C)] 98.7 °F (37.1 °C)  Heart Rate:  [77-98] 77  Resp:  [18] 18  BP: (147-169)/(100-104) 158/104  Total (NIH Stroke Scale): 4    Physical Exam   Constitutional: No acute distress, awake, alert, nontoxic  HENT: Mucous membranes moist  Respiratory: Good effort, nonlabored respirations on RA  Cardiovascular: NSR tele  Gastrointestinal: Soft, nondistended  Musculoskeletal: No overt peripheral edema, normal muscle tone for age  Psychiatric: Appropriate affect, cooperative  Neuro:  oriented to self and hospital but not exact name of hospital, no focal weakness, moves all ext equally.  Word finding and completion difficulty.         LAB RESULTS:      Lab 07/20/24 2326 07/20/24 2313 07/20/24 2312   WBC 5.37  --   --    HEMOGLOBIN 15.1  --   --    HEMOGLOBIN, POC  --  15.6  --    HEMATOCRIT 46.5  --   --    HEMATOCRIT POC  --  46  --    PLATELETS 235  --   --    NEUTROS ABS 3.97  --   --    IMMATURE GRANS (ABS) 0.06*  --   --    LYMPHS ABS 0.72  --   --    MONOS ABS 0.48  --   --    EOS ABS 0.12  --   --    MCV 88.9  --   --    PROTIME  --   --  13.1   INR  --   --  1.1         Lab 07/20/24 2326 07/20/24 2313   SODIUM 138  --    POTASSIUM 3.6  --    CHLORIDE 104  --    CO2 22.0  --    ANION GAP 12.0  --    BUN 13  --    CREATININE 0.71 0.70   EGFR 81.4 82.8   GLUCOSE 131*  --    CALCIUM 8.9  --          Lab 07/20/24 2326   TOTAL PROTEIN 6.4   ALBUMIN 3.9   GLOBULIN 2.5   ALT (SGPT) 21   AST (SGOT) 26   BILIRUBIN 0.4   ALK PHOS 105         Lab 07/20/24 2326   HSTROP T 13                 UA          5/5/2024    14:38 7/21/2024    00:16   Urinalysis   Squamous Epithelial Cells, UA 0-2     Specific Gravity, UA 1.007  1.037    Ketones, UA Negative  Negative    Blood, UA Negative  Negative    Leukocytes, UA  Trace  Negative    Nitrite, UA Negative  Negative    RBC, UA 0-2     WBC, UA 3-5     Bacteria, UA None Seen         Microbiology Results (last 10 days)       ** No results found for the last 240 hours. **            CT Angiogram Neck    Result Date: 7/21/2024  CT ANGIOGRAM HEAD W AI ANALYSIS OF LVO, CT ANGIOGRAM NECK Date of Exam: 7/20/2024 11:06 PM EDT Indication: confusion Acute Stroke. Comparison: CT angiogram of the head and neck from 7/1/2022 Technique: CTA of the head was performed after the uneventful intravenous administration of 115 mL Isovue-370. Reconstructed coronal and sagittal images were also obtained. In addition, a 3-D volume rendered image was created for interpretation. Automated exposure control and iterative reconstruction methods were used. Findings: Aortic arch: The aortic arch is unremarkable.  There is conventional 3 vessel arch anatomy.  The right brachiocephalic and visualized bilateral subclavian arteries are within normal limits. Right carotid: The right CCA arises as expected from the brachiocephalic trunk.  The CCA follows a normal course and appears normal caliber.  The carotid bifurcation is unremarkable.  The external carotid artery and distal branches appear within normal limits.  The cervical internal carotid artery follows a normal course and appears normal caliber throughout the neck and into the head.  The intracranial ICA segments appear within normal limits.  The ophthalmic artery origin is normal.  The A1 and M1 segments appear within normal limits.  The visualized distal MANN and MCA branches appear patent.  There is  a patent  anterior communicating artery. There is a patent  posterior communicating artery. Left carotid: The left CCA arises as expected from the aortic arch.   The CCA follows a normal course and appears normal caliber.  The carotid bifurcation is unremarkable.  The external carotid artery and distal branches appear within normal limits.  The  cervical  internal carotid artery follows a normal course and appears normal caliber throughout the neck and into the head. There is fusiform aneurysmal enlargement of the supraclinoid portion of the left internal carotid artery up to 8 mm, similar to the prior study. The ophthalmic artery origin is normal.  The A1 and M1 segments appear within normal limits. There is slight irregularity of the left anterior cerebral artery consistent with intracranial atherosclerosis. There is slight narrowing of the  proximal left M3 branch of the middle cerebral artery without occlusion. There is truncation of the distal left M4. There is a patent  posterior communicating artery. Posterior circulation: Vertebral arteries are patent skull base. Basilar artery is patent without focal stenosis or occlusion. The bilateral superior cerebellar arteries appear patent. There is fetal-type origin of the left posterior cerebral artery. This arises from the aneurysmal segment of the left internal carotid artery. There is irregularity of the distal branch of the left posterior cerebral artery compatible with intracranial atherosclerosis. There is occlusion of the P2 segment of the right posterior cerebral artery. No posterior circulation aneurysm identified. Nonvascular findings: Intracranial structures appear unremarkable.  Orbits are within normal limits.  Paranasal sinuses and mastoid air cells appear well aerated.  Superficial soft tissues and underlying musculature appear within normal limits.  The lung  apices are clear.  The thyroid and salivary glands appear unremarkable.  The suprahyoid and infrahyoid spaces of the neck appear unremarkable.  There is no evidence of cervical lymphadenopathy or a neck mass.  There are no acute osseous abnormalities or  destructive bone lesions.     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 2.Occlusion of the P2 segment of the right posterior  cerebral artery. 3.Occlusion of the distal left M4 branch of the middle cerebral artery. 4.Intracranial atherosclerosis. Electronically Signed: Familia Karimi MD  7/21/2024 12:23 AM EDT  Workstation ID: [id number]    CT Angiogram Head w AI Analysis of LVO    Result Date: 7/21/2024  CT ANGIOGRAM HEAD W AI ANALYSIS OF LVO, CT ANGIOGRAM NECK Date of Exam: 7/20/2024 11:06 PM EDT Indication: confusion Acute Stroke. Comparison: CT angiogram of the head and neck from 7/1/2022 Technique: CTA of the head was performed after the uneventful intravenous administration of 115 mL Isovue-370. Reconstructed coronal and sagittal images were also obtained. In addition, a 3-D volume rendered image was created for interpretation. Automated exposure control and iterative reconstruction methods were used. Findings: Aortic arch: The aortic arch is unremarkable.  There is conventional 3 vessel arch anatomy.  The right brachiocephalic and visualized bilateral subclavian arteries are within normal limits. Right carotid: The right CCA arises as expected from the brachiocephalic trunk.  The CCA follows a normal course and appears normal caliber.  The carotid bifurcation is unremarkable.  The external carotid artery and distal branches appear within normal limits.  The cervical internal carotid artery follows a normal course and appears normal caliber throughout the neck and into the head.  The intracranial ICA segments appear within normal limits.  The ophthalmic artery origin is normal.  The A1 and M1 segments appear within normal limits.  The visualized distal MANN and MCA branches appear patent.  There is  a patent  anterior communicating artery. There is a patent  posterior communicating artery. Left carotid: The left CCA arises as expected from the aortic arch.   The CCA follows a normal course and appears normal caliber.  The carotid bifurcation is unremarkable.  The external carotid artery and distal branches appear within normal limits.   The  cervical internal carotid artery follows a normal course and appears normal caliber throughout the neck and into the head. There is fusiform aneurysmal enlargement of the supraclinoid portion of the left internal carotid artery up to 8 mm, similar to the prior study. The ophthalmic artery origin is normal.  The A1 and M1 segments appear within normal limits. There is slight irregularity of the left anterior cerebral artery consistent with intracranial atherosclerosis. There is slight narrowing of the  proximal left M3 branch of the middle cerebral artery without occlusion. There is truncation of the distal left M4. There is a patent  posterior communicating artery. Posterior circulation: Vertebral arteries are patent skull base. Basilar artery is patent without focal stenosis or occlusion. The bilateral superior cerebellar arteries appear patent. There is fetal-type origin of the left posterior cerebral artery. This arises from the aneurysmal segment of the left internal carotid artery. There is irregularity of the distal branch of the left posterior cerebral artery compatible with intracranial atherosclerosis. There is occlusion of the P2 segment of the right posterior cerebral artery. No posterior circulation aneurysm identified. Nonvascular findings: Intracranial structures appear unremarkable.  Orbits are within normal limits.  Paranasal sinuses and mastoid air cells appear well aerated.  Superficial soft tissues and underlying musculature appear within normal limits.  The lung  apices are clear.  The thyroid and salivary glands appear unremarkable.  The suprahyoid and infrahyoid spaces of the neck appear unremarkable.  There is no evidence of cervical lymphadenopathy or a neck mass.  There are no acute osseous abnormalities or  destructive bone lesions.     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 2.Occlusion of the P2 segment of the right  posterior cerebral artery. 3.Occlusion of the distal left M4 branch of the middle cerebral artery. 4.Intracranial atherosclerosis. Electronically Signed: Familia Karimi MD  7/21/2024 12:23 AM EDT  Workstation ID: [id number]    CT CEREBRAL PERFUSION WITH & WITHOUT CONTRAST    Result Date: 7/21/2024  CT CEREBRAL PERFUSION W WO CONTRAST Date of Exam: 7/20/2024 11:06 PM EDT Indication: confusion.  Comparison: CT scan of the head dated 7/1/2022; MRI brain 7/2/2022 Technique: Axial CT images of the brain were obtained prior to and after the administration of 115 mL Isovue-370. Core blood volume, core blood flow, mean transit time, and Tmax images were obtained utilizing the Rapid software protocol. A limited CT angiogram of the head was also performed to measure the blood vessel density. The radiation dose reduction device was turned on for each scan per the ALARA (As Low as Reasonably Achievable) protocol. Findings: Cerebral blood flow, blood volume and mean transit time maps are symmetric without large perfusion defect. CBF<30% volume: 0 mL Tmax>6sec volume: 17 mL within the left parietal lobe in the distribution of the left middle cerebral artery. Mismatch volume: 17 mL Mismatch ratio: Infinite     Impression: Impression: 17 mL of ischemia in the left parietal lobe in the distribution of the left middle cerebral artery. Electronically Signed: Familia Karimi MD  7/21/2024 12:06 AM EDT  Workstation ID: [id number]    XR Chest 1 View    Result Date: 7/20/2024  XR CHEST 1 VW Date of Exam: 7/20/2024 11:03 PM EDT Indication: confusion Comparison: Chest radiograph dated 5/5/2024 Findings: The heart is enlarged. There is tortuosity of the thoracic aorta. The lungs are clear.     Impression: Impression: Cardiomegaly. No active disease. Electronically Signed: Familia Karimi MD  7/20/2024 11:50 PM EDT  Workstation ID: [id number]    CT Head Without Contrast Stroke Protocol    Result Date: 7/20/2024  CT HEAD WO CONTRAST STROKE PROTOCOL Date of  Exam: 7/20/2024 11:05 PM EDT Indication: confusion. Comparison: None available. Technique: Axial CT images were obtained of the head without contrast administration.  Reconstructed coronal images were also obtained. Automated exposure control and iterative construction methods were used. Scan Time: 2307 Results discussed with Rep. from the stroke team at 11:16 p.m. Findings: There is mild diffuse generalized atrophy. There are low-attenuation areas in the periventricular white matter consistent with chronic microvascular ischemic change. There is no mass, mass effect or midline shift. There are no abnormal extra-axial fluid collections or areas of acute hemorrhage. The paranasal sinuses are clear. The mastoid air cells are clear.     Impression: Impression: Atrophy and chronic microvascular ischemic change. No acute intracranial process. Electronically Signed: Familia Karimi MD  7/20/2024 11:16 PM EDT  Workstation ID: [id number]     Results for orders placed during the hospital encounter of 07/01/22    Adult Transthoracic Echo Complete W/ Cont if Necessary Per Protocol (With Agitated Saline)    Interpretation Summary  · Left ventricular wall thickness is consistent with mild concentric hypertrophy.  · Normal left ventricular systolic function, estimated EF 55%.  · Left ventricular diastolic function is consistent with (grade I) impaired relaxation.  · Aortic sclerosis with mild aortic insufficiency, no evidence of aortic stenosis.  · Mild mitral vegetation.  · Trace to mild tricuspid regurgitation with normal RVSP.      Assessment & Plan   Assessment & Plan       CVA (cerebral vascular accident)    Atrial fibrillation    H/O cardioembolic CVA      Acute left MCA CVA with residual confusion and dysphasia  - Stroke team follows  - MRI pending  - SLP evaluation pending    Hx of cardioembolic CVA  Hx of right PCA CVA   PAF  Hx of noncompliance with Eliquis      VTE Prophylaxis:  Mechanical VTE prophylaxis orders are signed  & held.            CODE STATUS:  FULL  There are no questions and answers to display.       Expected Discharge   7/23/24    Mishel Armstrong MD  07/21/24

## 2024-07-21 NOTE — ED NOTES
Yolie Jacob    Nursing Report ED to Floor:  Mental status: a&ox1  Ambulatory status: up w1  Oxygen Therapy:  ra  Cardiac Rhythm: nsr  Admitted from: home  Safety Concerns:  fall risk  Social Issues: none  ED Room #:  20    ED Nurse Phone Extension - [phone] or may call [phone].      HPI:   Chief Complaint   Patient presents with    Speech Problem    Blurred Vision    Gait Problem       Past Medical History:  Past Medical History:   Diagnosis Date    A-fib     Arthritis     H/O cardioembolic CVA 2024    Hypertension     Stroke         Past Surgical History:  Past Surgical History:   Procedure Laterality Date    APPENDECTOMY      BREAST SURGERY      CATARACT EXTRACTION       SECTION      EYE SURGERY      HEMORRHOIDECTOMY      INTRAOCULAR LENS INSERTION      TONSILLECTOMY          Admitting Doctor:   Mishel Armstrong MD    Consulting Provider(s):  Consults       No orders found for last 30 day(s).             Admitting Diagnosis:   The primary encounter diagnosis was Acute confusion. Diagnoses of Slurred speech and Acute stroke due to ischemia were also pertinent to this visit.    Most Recent Vitals:   Vitals:    24 0145 24 0200 24 0215 24 0230   BP: 159/96 148/80 (!) 167/111 (!) 164/102   Pulse: 85 81 98 81   Resp:       Temp:       TempSrc:       SpO2: 94% 94% 94% 94%   Weight:       Height:           Active LDAs/IV Access:   Lines, Drains & Airways       Active LDAs       Name Placement date Placement time Site Days    Peripheral IV 24 2328 Anterior;Left;Upper Arm 24  2328  Arm  less than 1                    Labs (abnormal labs have a star):   Labs Reviewed   COMPREHENSIVE METABOLIC PANEL - Abnormal; Notable for the following components:       Result Value    Glucose 131 (*)     All other components within normal limits    Narrative:     GFR Normal >60  Chronic Kidney Disease <60  Kidney Failure <15    The GFR formula is only valid for adults with stable renal function  between ages 18 and 70.   URINALYSIS W/ CULTURE IF INDICATED - Abnormal; Notable for the following components:    Specific Gravity, UA 1.037 (*)     All other components within normal limits    Narrative:     In absence of clinical symptoms, the presence of pyuria, bacteria, and/or nitrites on the urinalysis result does not correlate with infection.  Urine microscopic not indicated.   CBC WITH AUTO DIFFERENTIAL - Abnormal; Notable for the following components:    Lymphocyte % 13.4 (*)     Immature Grans % 1.1 (*)     Immature Grans, Absolute 0.06 (*)     All other components within normal limits   POCT CHEM 8 - Abnormal; Notable for the following components:    Ionized Calcium 1.16 (*)     All other components within normal limits   COVID-19 AND FLU A/B, NP SWAB IN TRANSPORT MEDIA 1 HR TAT - Normal    Narrative:     Fact sheet for providers: https://www.fda.gov/media/490579/download    Fact sheet for patients: https://www.fda.gov/media/924610/download    Test performed by PCR.   SINGLE HS TROPONIN T - Normal    Narrative:     High Sensitive Troponin T Reference Range:  <14.0 ng/L- Negative Female for AMI  <22.0 ng/L- Negative Male for AMI  >=14 - Abnormal Female indicating possible myocardial injury.  >=22 - Abnormal Male indicating possible myocardial injury.   Clinicians would have to utilize clinical acumen, EKG, Troponin, and serial changes to determine if it is an Acute Myocardial Infarction or myocardial injury due to an underlying chronic condition.        POCT PROTIME - INR - Normal   COVID PRE-OP / PRE-PROCEDURE SCREENING ORDER (NO ISOLATION)    Narrative:     The following orders were created for panel order COVID PRE-OP / PRE-PROCEDURE SCREENING ORDER (NO ISOLATION) - Swab, Nasopharynx.  Procedure                               Abnormality         Status                     ---------                               -----------         ------                     COVID-19 and FLU A/B PCR...[[id number]]  Normal               Final result                 Please view results for these tests on the individual orders.   CBC AND DIFFERENTIAL    Narrative:     The following orders were created for panel order CBC & Differential.  Procedure                               Abnormality         Status                     ---------                               -----------         ------                     CBC Auto Differential[[id number]]        Abnormal            Final result                 Please view results for these tests on the individual orders.       Meds Given in ED:   Medications   iopamidol (ISOVUE-370) 76 % injection 150 mL (115 mL Intravenous Given 7/20/24 9132)     No current facility-administered medications for this encounter.       Last NIH score:     1a. Level of Consciousness: 0-->Alert, keenly responsive  1b. LOC Questions: 1-->Answers one question correctly  1c. LOC Commands: 0-->Performs both tasks correctly  2. Best Gaze: 0-->Normal  3. Visual: 2-->Complete hemianopia  4. Facial Palsy: 0-->Normal symmetrical movements  5a. Motor Arm, Left: 0-->No drift, limb holds 90 (or 45) degrees for full 10 secs  5b. Motor Arm, Right: 0-->No drift, limb holds 90 (or 45) degrees for full 10 secs  6a. Motor Leg, Left: 0-->No drift, leg holds 30 degree position for full 5 secs  6b. Motor Leg, Right: 0-->No drift, leg holds 30 degree position for full 5 secs  7. Limb Ataxia: 0-->Absent  8. Sensory: 0-->Normal, no sensory loss  9. Best Language: 1-->Mild-to-moderate aphasia, some obvious loss of fluency or facility of comprehension, without significant limitation on ideas expressed or form of expression. Reduction of speech and/or comprehension, however, makes conversation. . . (see row details)  10. Dysarthria: 0-->Normal  11. Extinction and Inattention (formerly Neglect): 0-->No abnormality    Total (NIH Stroke Scale): 4     Dysphagia screening results:  Patient Factors Component (Dysphagia:Stroke or Rule-out)  Best Eye  Response: 4-->(E4) spontaneous (07/21/24 0033)  Best Motor Response: 6-->(M6) obeys commands (07/21/24 0033)  Best Verbal Response: 4-->(V4) confused (07/21/24 0033)  [place] Coma Scale Score: 14 (07/21/24 0033)     [place] Coma Scale:  No data recorded     CIWA:        Restraint Type:            Isolation Status:  No active isolations

## 2024-07-21 NOTE — THERAPY EVALUATION
Patient Name: Yolie Jacob  : 1935    MRN: [id number]                              Today's Date: 2024       Admit Date: 2024    Visit Dx:     ICD-10-CM ICD-9-CM   1. Acute confusion  R41.0 293.0   2. Slurred speech  R47.81 784.59   3. Acute stroke due to ischemia  I63.9 434.91     Patient Active Problem List   Diagnosis    Visual disturbance    Ataxia    Paresthesia and pain of left extremity    Headache    Atrial fibrillation    Cardioembolic stroke    CVA (cerebral vascular accident)    H/O cardioembolic CVA     Past Medical History:   Diagnosis Date    A-fib     Arthritis     H/O cardioembolic CVA 2024    Hypertension     Stroke      Past Surgical History:   Procedure Laterality Date    APPENDECTOMY      BREAST SURGERY      CATARACT EXTRACTION       SECTION      EYE SURGERY      HEMORRHOIDECTOMY      INTRAOCULAR LENS INSERTION      TONSILLECTOMY        General Information       Row Name 24 1436          Physical Therapy Time and Intention    Document Type evaluation  -KG     Mode of Treatment physical therapy  -KG       Row Name 24 1436          General Information    Patient Profile Reviewed yes  -KG     Prior Level of Function independent:;all household mobility;gait;transfer;ADL's;dressing;bathing  pt limited historian; aphasic; TBA later  -KG     Existing Precautions/Restrictions fall;other (see comments)  aphasia; confusion; decreased command following; delayed response; low vision; R neglect  -KG     Barriers to Rehab medically complex;cognitive status;visual deficit  -KG       Row Name 24 1436          Living Environment    People in Home child(amy), adult  -KG       Row Name 24 1436          Home Main Entrance    Number of Stairs, Main Entrance --  pt is limited historian; TBA later  -KG       Row Name 24 1436          Cognition    Orientation Status (Cognition) oriented to;person;verbal cues/prompts needed for orientation;place  -KG        Row Name 07/21/24 1436          Safety Issues, Functional Mobility    Safety Issues Affecting Function (Mobility) ability to follow commands;awareness of need for assistance;insight into deficits/self-awareness;judgment;safety precaution awareness;safety precautions follow-through/compliance;sequencing abilities  -KG     Impairments Affecting Function (Mobility) balance;cognition;coordination;endurance/activity tolerance;postural/trunk control;strength;visual/perceptual  -KG     Cognitive Impairments, Mobility Safety/Performance attention;awareness, need for assistance;insight into deficits/self-awareness;safety precaution awareness;safety precaution follow-through;sequencing abilities  -KG               User Key  (r) = Recorded By, (t) = Taken By, (c) = Cosigned By      Initials Name Provider Type    KG Gwendolyn Barnes, PT Physical Therapist                   Mobility       Row Name 07/21/24 1438          Bed Mobility    Bed Mobility supine-sit;sit-supine  -KG     Supine-Sit [place] (Bed Mobility) contact guard;verbal cues  -KG     Sit-Supine [place] (Bed Mobility) contact guard;verbal cues  -KG     Assistive Device (Bed Mobility) bed rails;head of bed elevated  -KG     Comment, (Bed Mobility) VC's for sequencing and technique. Pt required increased time and effort to complete.  -KG       Row Name 07/21/24 1438          Transfers    Comment, (Transfers) VC's for sequencing and safety awareness.  -KG       Row Name 07/21/24 1438          Sit-Stand Transfer    Sit-Stand [place] (Transfers) contact guard;verbal cues  -KG       Row Name 07/21/24 1438          Gait/Stairs (Locomotion)    [place] Level (Gait) minimum assist (75% patient effort);verbal cues  -KG     Assistive Device (Gait) other (see comments)  L UE support  -KG     Distance in Feet (Gait) 100  -KG     Deviations/Abnormal Patterns (Gait) base of support, narrow;yang decreased;stride length decreased  -KG     Bilateral Gait  Deviations forward flexed posture;heel strike decreased  -KG     Comment, (Gait/Stairs) Pt demonstrated step through gait pattern with slow yang and decreased step length. VC's for upright posture. Pt unsteady at times with periods of lateral lean to the R.  -KG               User Key  (r) = Recorded By, (t) = Taken By, (c) = Cosigned By      Initials Name Provider Type    KG Gwendolyn Barnes, JUSTINE Physical Therapist                   Obj/Interventions       Row Name 07/21/24 1439          Range of Motion Comprehensive    General Range of Motion no range of motion deficits identified  -KG     Comment, General Range of Motion B LE WFL  -KG       Row Name 07/21/24 1439          Strength Comprehensive (MMT)    Comment, General Manual Muscle Testing (MMT) Assessment B LE grossly 3+/5  -KG       Row Name 07/21/24 1439          Balance    Balance Assessment sitting static balance;standing static balance;standing dynamic balance  -KG     Static Sitting Balance supervision  -KG     Position, Sitting Balance unsupported;sitting edge of bed  -KG     Static Standing Balance contact guard  -KG     Dynamic Standing Balance minimal assist  -KG     Position/Device Used, Standing Balance supported  -KG       Row Name 07/21/24 1439          Sensory Assessment (Somatosensory)    Sensory Assessment (Somatosensory) LE sensation intact  -KG               User Key  (r) = Recorded By, (t) = Taken By, (c) = Cosigned By      Initials Name Provider Type    KG Gwendolyn Barnes, PT Physical Therapist                   Goals/Plan       Row Name 07/21/24 1441          Bed Mobility Goal 1 (PT)    Activity/Assistive Device (Bed Mobility Goal 1, PT) sit to supine;supine to sit  -KG     [place] Level/Cues Needed (Bed Mobility Goal 1, PT) standby assist  -KG     Time Frame (Bed Mobility Goal 1, PT) short term goal (STG);3 days  -KG     Progress/Outcomes (Bed Mobility Goal 1, PT) goal ongoing  -KG       Row Name 07/21/24 1441           Transfer Goal 1 (PT)    Activity/Assistive Device (Transfer Goal 1, PT) sit-to-stand/stand-to-sit;bed-to-chair/chair-to-bed;walker, rolling  -KG     [place] Level/Cues Needed (Transfer Goal 1, PT) contact guard required  -KG     Time Frame (Transfer Goal 1, PT) long term goal (LTG);1 week  -KG     Progress/Outcome (Transfer Goal 1, PT) goal ongoing  -KG       Row Name 07/21/24 1441          Gait Training Goal 1 (PT)    Activity/Assistive Device (Gait Training Goal 1, PT) gait (walking locomotion);assistive device use;walker, rolling  -KG     [place] Level (Gait Training Goal 1, PT) contact guard required  -KG     Distance (Gait Training Goal 1, PT) 200 feet  -KG     Time Frame (Gait Training Goal 1, PT) long term goal (LTG);1 week  -KG     Progress/Outcome (Gait Training Goal 1, PT) goal ongoing  -KG       Row Name 07/21/24 1441          Therapy Assessment/Plan (PT)    Planned Therapy Interventions (PT) balance training;bed mobility training;gait training;strengthening;transfer training  -KG               User Key  (r) = Recorded By, (t) = Taken By, (c) = Cosigned By      Initials Name Provider Type    KG Gwendolyn Barnes, PT Physical Therapist                   Clinical Impression       Row Name 07/21/24 1440          Pain    Pretreatment Pain Rating 0/10 - no pain  -KG     Posttreatment Pain Rating 0/10 - no pain  -KG       Row Name 07/21/24 1440          Plan of Care Review    Plan of Care Reviewed With patient  -KG     Outcome Evaluation PT initial evaluation completed for pt presenting with generalized weakness, impaired balance and coordination, aphasia, confusion, visual deficits, and decreased functional mobility. Pt ambulated 100ft with Gama and UE support. Pt's decreased independence warrants PT skilled care. Recommend D/C to  rehab facility.  -KG       Row Name 07/21/24 1440          Therapy Assessment/Plan (PT)    Patient/Family Therapy Goals Statement (PT) return to [facility]  -KG     Rehab  Potential (PT) good, to achieve stated therapy goals  -KG     Criteria for Skilled Interventions Met (PT) yes;skilled treatment is necessary  -KG     Therapy Frequency (PT) daily  -KG     Predicted Duration of Therapy Intervention (PT) 7 days  -KG       Row Name 07/21/24 1440          Vital Signs    Pre Systolic BP Rehab 174  -KG     Pre Treatment Diastolic BP 95  -KG     Post Systolic BP Rehab 172  -KG     Post Treatment Diastolic BP 98  -KG     Pretreatment Heart Rate (beats/min) 85  -KG     Posttreatment Heart Rate (beats/min) 83  -KG     Pre SpO2 (%) 93  -KG     O2 Delivery Pre Treatment room air  -KG     Post SpO2 (%) 95  -KG     O2 Delivery Post Treatment room air  -KG     Pre Patient Position Supine  -KG     Intra Patient Position Standing  -KG     Post Patient Position Supine  -KG       Row Name 07/21/24 1440          Positioning and Restraints    Pre-Treatment Position in bed  -KG     Post Treatment Position bed  -KG     In Bed notified nsg;supine;call light within reach;encouraged to call for assist;exit alarm on;side rails up x2  -KG               User Key  (r) = Recorded By, (t) = Taken By, (c) = Cosigned By      Initials Name Provider Type    KG Gwendolyn Barnes, PT Physical Therapist                   Outcome Measures       Row Name 07/21/24 1442 07/21/24 0800       How much help from another person do you currently need...    Turning from your back to your side while in flat bed without using bedrails? 3  -KG 3  -LH    Moving from lying on back to sitting on the side of a flat bed without bedrails? 3  -KG 3  -LH    Moving to and from a bed to a chair (including a wheelchair)? 3  -KG 3  -LH    Standing up from a chair using your arms (e.g., wheelchair, bedside chair)? 3  -KG 3  -LH    Climbing 3-5 steps with a railing? 2  -KG 3  -LH    To walk in hospital room? 3  -KG 3  -LH    AM-PAC 6 Clicks Score (PT) 17  -KG 18  -LH    Highest Level of Mobility Goal 5 --> Static standing  -KG 6 --> Walk 10  steps or more  -      Row Name 07/21/24 0658          How much help from another person do you currently need...    Turning from your back to your side while in flat bed without using bedrails? 3  -JT     Moving from lying on back to sitting on the side of a flat bed without bedrails? 3  -JT     Moving to and from a bed to a chair (including a wheelchair)? 2  -JT     Standing up from a chair using your arms (e.g., wheelchair, bedside chair)? 2  -JT     Climbing 3-5 steps with a railing? 2  -JT     To walk in hospital room? 2  -JT     AM-PAC 6 Clicks Score (PT) 14  -JT     Highest Level of Mobility Goal 4 --> Transfer to chair/commode  -JT       Row Name 07/21/24 1442 07/21/24 0921       Modified [place] Scale    Pre-Stroke Modified [place] Scale 6 - Unable to determine (UTD) from the medical record documentation  -KG --    Modified Demetrius Scale 3 - Moderate disability.  Requiring some help, but able to walk without assistance.  -KG 3 - Moderate disability.  Requiring some help, but able to walk without assistance.  -      Row Name 07/21/24 1442 07/21/24 0921       Functional Assessment    Outcome Measure Options AM-PAC 6 Clicks Basic Mobility (PT);Modified [place]rius  -KG AM-PAC 6 Clicks Daily Activity (OT);Modified [place]  -CS              User Key  (r) = Recorded By, (t) = Taken By, (c) = Cosigned By      Initials Name Provider Type    Gwendolyn Grove, PT Physical Therapist    Wilfrid Loja, OT Occupational Therapist    Evelin Valencia, RN Registered Nurse     Jackie Guzmán RN Registered Nurse                                 Physical Therapy Education       Title: PT OT SLP Therapies (In Progress)       Topic: Physical Therapy (In Progress)       Point: Mobility training (In Progress)       Learning Progress Summary             Patient Acceptance, E, NR by KG at 7/21/2024 1026                         Point: Home exercise program (Not Started)       Learner Progress:  Not documented in this visit.               Point: Body mechanics (In Progress)       Learning Progress Summary             Patient Acceptance, E, NR by KG at 7/21/2024 1026                         Point: Precautions (In Progress)       Learning Progress Summary             Patient Acceptance, E, NR by KG at 7/21/2024 1026                                         User Key       Initials Effective Dates Name Provider Type Discipline    NATALY 05/22/20 -  Gwendolyn Barnes, PT Physical Therapist PT                  PT Recommendation and Plan  Planned Therapy Interventions (PT): balance training, bed mobility training, gait training, strengthening, transfer training  Plan of Care Reviewed With: patient  Outcome Evaluation: PT initial evaluation completed for pt presenting with generalized weakness, impaired balance and coordination, aphasia, confusion, visual deficits, and decreased functional mobility. Pt ambulated 100ft with Gama and UE support. Pt's decreased independence warrants PT skilled care. Recommend D/C to  rehab facility.     Time Calculation:   PT Evaluation Complexity  History, PT Evaluation Complexity: 3 or more personal factors and/or comorbidities  Examination of Body Systems (PT Eval Complexity): total of 3 or more elements  Clinical Presentation (PT Evaluation Complexity): evolving  Clinical Decision Making (PT Evaluation Complexity): moderate complexity  Overall Complexity (PT Evaluation Complexity): moderate complexity     PT Charges       Row Name 07/21/24 1026             Time Calculation    Start Time 1026  -KG      PT Received On 07/21/24  -KG      PT Goal Re-Cert Due Date 07/31/24  -KG         Untimed Charges    PT Eval/Re-eval Minutes 49  -KG         Total Minutes    Untimed Charges Total Minutes 49  -KG       Total Minutes 49  -KG                User Key  (r) = Recorded By, (t) = Taken By, (c) = Cosigned By      Initials Name Provider Type    KG Gwendolyn Barnes, PT Physical Therapist                  Therapy  Charges for Today       Code Description Service Date Service Provider Modifiers Qty    88836068791 HC PT EVAL MOD COMPLEXITY 4 7/21/2024 Gwendolyn Barnes, PT GP 1            PT G-Codes  Outcome Measure Options: AM-PAC 6 Clicks Basic Mobility (PT), Modified [place]  AM-PAC 6 Clicks Score (PT): 17  AM-PAC 6 Clicks Score (OT): 16  Modified Demetrius Scale: 3 - Moderate disability.  Requiring some help, but able to walk without assistance.  PT Discharge Summary  Anticipated Discharge Disposition (PT): inpatient rehabilitation facility    Kacey Barnes, PT  7/21/2024

## 2024-07-21 NOTE — PROGRESS NOTES
Stroke Progress Note       Chief Complaint: AMS        Subjective    Subjective     Subjective:    Patient comprehension seems to be affected    Objective      Temp:  [98.3 °F (36.8 °C)-98.7 °F (37.1 °C)] 98.3 °F (36.8 °C)  Heart Rate:  [69-98] 77  Resp:  [16-18] 16  BP: (131-177)/() 131/80            Results Review:    I reviewed the patient's new clinical results.    Lab Results (last 24 hours)       Procedure Component Value Units Date/Time    POC Glucose Once [[id number]]  (Normal) Collected: 07/21/24 0623    Specimen: Blood Updated: 07/21/24 0624     Glucose 104 mg/dL     COVID PRE-OP / PRE-PROCEDURE SCREENING ORDER (NO ISOLATION) - Swab, Nasopharynx [[id number]]  (Normal) Collected: 07/20/24 2342    Specimen: Swab from Nasopharynx Updated: 07/21/24 0149    Narrative:      The following orders were created for panel order COVID PRE-OP / PRE-PROCEDURE SCREENING ORDER (NO ISOLATION) - Swab, Nasopharynx.  Procedure                               Abnormality         Status                     ---------                               -----------         ------                     COVID-19 and FLU A/B PCR...[[id number]]  Normal              Final result                 Please view results for these tests on the individual orders.    COVID-19 and FLU A/B PCR, 1 HR TAT - Swab, Nasopharynx [[id number]]  (Normal) Collected: 07/20/24 2342    Specimen: Swab from Nasopharynx Updated: 07/21/24 0149     COVID19 Not Detected     Influenza A PCR Not Detected     Influenza B PCR Not Detected    Narrative:      Fact sheet for providers: https://www.fda.gov/media/813806/download    Fact sheet for patients: https://www.fda.gov/media/147357/download    Test performed by PCR.    Urinalysis With Culture If Indicated - Urine, Clean Catch [[id number]]  (Abnormal) Collected: 07/21/24 0016    Specimen: Urine, Clean Catch Updated: 07/21/24 0053     Color, UA Yellow     Appearance, UA Clear     pH, UA 6.5     Specific [place], UA 1.037      Glucose, UA Negative     Ketones, UA Negative     Bilirubin, UA Negative     Blood, UA Negative     Protein, UA Negative     Leuk Esterase, UA Negative     Nitrite, UA Negative     Urobilinogen, UA 0.2 E.U./dL    Narrative:      In absence of clinical symptoms, the presence of pyuria, bacteria, and/or nitrites on the urinalysis result does not correlate with infection.  Urine microscopic not indicated.    Comprehensive Metabolic Panel [[id number]]  (Abnormal) Collected: 07/20/24 2326    Specimen: Blood Updated: 07/21/24 0000     Glucose 131 mg/dL      BUN 13 mg/dL      Creatinine 0.71 mg/dL      Sodium 138 mmol/L      Potassium 3.6 mmol/L      Chloride 104 mmol/L      CO2 22.0 mmol/L      Calcium 8.9 mg/dL      Total Protein 6.4 g/dL      Albumin 3.9 g/dL      ALT (SGPT) 21 U/L      AST (SGOT) 26 U/L      Alkaline Phosphatase 105 U/L      Total Bilirubin 0.4 mg/dL      Globulin 2.5 gm/dL      Comment: Calculated Result        A/G Ratio 1.6 g/dL      BUN/Creatinine Ratio 18.3     Anion Gap 12.0 mmol/L      eGFR 81.4 mL/min/1.73     Narrative:      GFR Normal >60  Chronic Kidney Disease <60  Kidney Failure <15    The GFR formula is only valid for adults with stable renal function between ages 18 and 70.    Single High Sensitivity Troponin T [[id number]]  (Normal) Collected: 07/20/24 2326    Specimen: Blood Updated: 07/20/24 2358     HS Troponin T 13 ng/L     Narrative:      High Sensitive Troponin T Reference Range:  <14.0 ng/L- Negative Female for AMI  <22.0 ng/L- Negative Male for AMI  >=14 - Abnormal Female indicating possible myocardial injury.  >=22 - Abnormal Male indicating possible myocardial injury.   Clinicians would have to utilize clinical acumen, EKG, Troponin, and serial changes to determine if it is an Acute Myocardial Infarction or myocardial injury due to an underlying chronic condition.         CBC & Differential [[id number]]  (Abnormal) Collected: 07/20/24 2326    Specimen: Blood Updated: 07/20/24 0963     Narrative:      The following orders were created for panel order CBC & Differential.  Procedure                               Abnormality         Status                     ---------                               -----------         ------                     CBC Auto Differential[[id number]]        Abnormal            Final result                 Please view results for these tests on the individual orders.    CBC Auto Differential [[id number]]  (Abnormal) Collected: 07/20/24 2326    Specimen: Blood Updated: 07/20/24 2339     WBC 5.37 10*3/mm3      RBC 5.23 10*6/mm3      Hemoglobin 15.1 g/dL      Hematocrit 46.5 %      MCV 88.9 fL      MCH 28.9 pg      MCHC 32.5 g/dL      RDW 13.8 %      RDW-SD 44.6 fl      MPV 10.8 fL      Platelets 235 10*3/mm3      Neutrophil % 74.0 %      Lymphocyte % 13.4 %      Monocyte % 8.9 %      Eosinophil % 2.2 %      Basophil % 0.4 %      Immature Grans % 1.1 %      Neutrophils, Absolute 3.97 10*3/mm3      Lymphocytes, Absolute 0.72 10*3/mm3      Monocytes, Absolute 0.48 10*3/mm3      Eosinophils, Absolute 0.12 10*3/mm3      Basophils, Absolute 0.02 10*3/mm3      Immature Grans, Absolute 0.06 10*3/mm3      nRBC 0.0 /100 WBC     POC CHEM 8 [[id number]]  (Abnormal) Collected: 07/20/24 2313    Specimen: Blood Updated: 07/20/24 2327     Glucose 126 mg/dL      BUN 15 mg/dL      Creatinine 0.70 mg/dL      Sodium 139 mmol/L      POC Potassium 3.5 mmol/L      Chloride 105 mmol/L      Total CO2 24 mmol/L      Hemoglobin 15.6 g/dL      Comment: Serial Number: [id number]:  [id number]        Hematocrit 46 %      Ionized Calcium 1.16 mmol/L      eGFR 82.8 mL/min/1.73     POC Protime / INR [[id number]]  (Normal) Collected: 07/20/24 2312    Specimen: Blood Updated: 07/20/24 2326     Protime 13.1 seconds      INR 1.1     Comment: Serial Number: [id number]:  [id number]             CT Angiogram Neck    Result Date: 7/21/2024  Impression: 1.Stable 8 mm fusiform aneurysm of the supraclinoid left internal  carotid artery. 2.Occlusion of the P2 segment of the right posterior cerebral artery. 3.Occlusion of the distal left M4 branch of the middle cerebral artery. 4.Intracranial atherosclerosis. Electronically Signed: Familia Karimi MD  7/21/2024 12:23 AM EDT  Workstation ID: [id number]    CT Angiogram Head w AI Analysis of LVO    Result Date: 7/21/2024  Impression: 1.Stable 8 mm fusiform aneurysm of the supraclinoid left internal carotid artery. 2.Occlusion of the P2 segment of the right posterior cerebral artery. 3.Occlusion of the distal left M4 branch of the middle cerebral artery. 4.Intracranial atherosclerosis. Electronically Signed: Familia Karimi MD  7/21/2024 12:23 AM EDT  Workstation ID: [id number]    CT CEREBRAL PERFUSION WITH & WITHOUT CONTRAST    Result Date: 7/21/2024  Impression: 17 mL of ischemia in the left parietal lobe in the distribution of the left middle cerebral artery. Electronically Signed: Familia Karimi MD  7/21/2024 12:06 AM EDT  Workstation ID: [id number]    XR Chest 1 View    Result Date: 7/20/2024  Impression: Cardiomegaly. No active disease. Electronically Signed: Familia Karimi MD  7/20/2024 11:50 PM EDT  Workstation ID: [id number]    CT Head Without Contrast Stroke Protocol    Result Date: 7/20/2024  Impression: Atrophy and chronic microvascular ischemic change. No acute intracranial process. Electronically Signed: Familia Karimi MD  7/20/2024 11:16 PM EDT  Workstation ID: [id number]   Results for orders placed during the hospital encounter of 07/01/22    Adult Transthoracic Echo Complete W/ Cont if Necessary Per Protocol (With Agitated Saline)    Interpretation Summary  · Left ventricular wall thickness is consistent with mild concentric hypertrophy.  · Normal left ventricular systolic function, estimated EF 55%.  · Left ventricular diastolic function is consistent with (grade I) impaired relaxation.  · Aortic sclerosis with mild aortic insufficiency, no evidence of aortic stenosis.  · Mild mitral  vegetation.  · Trace to mild tricuspid regurgitation with normal RVSP.            Assessment/Plan     Assessment/Plan:  89 year old  female with multiple vascular risk factors who presented to [facility] ED with her son for intermittent neurologic symptoms including AMS, LFD, speech difficulties, and balance disturbance.    CTA showed left supraclinoid ICA aneurysm, which is stable, left M3 M4 occlusion.  With a perfusion deficit in the MCA territory.    On my exam, strength 4/5 in all extremities, neglect on the right, mixed aphasia,    Suspect left MCA stroke, likely etiology cardioembolism in setting of noncompliance of her anticoagulation.  -start heparin for now, discontinue aspirin   - It is crucial to engage with the family to discuss the patient’s compliance challenges and potential insurance barriers for Eliquis approval.  --Systolic 110-160  -PT OT speech and continue to work with the patient    Left ICA aneurysm -8 mm stable- unruptured- follow up with neurosurgery    Stroke team will continue to follow the patient.        Ulises Kay MD  07/21/24  12:29 EDT

## 2024-07-21 NOTE — PLAN OF CARE
Goal Outcome Evaluation:  Plan of Care Reviewed With: patient           Outcome Evaluation: PT initial evaluation completed for pt presenting with generalized weakness, impaired balance and coordination, aphasia, confusion, visual deficits, and decreased functional mobility. Pt ambulated 100ft with Gama and UE support. Pt's decreased independence warrants PT skilled care. Recommend D/C to IP rehab facility.      Anticipated Discharge Disposition (PT): inpatient rehabilitation facility

## 2024-07-21 NOTE — THERAPY EVALUATION
Acute Care - Speech Language Pathology Initial Evaluation  [facility]  Cognitive-Communication Evaluation       Patient Name: Yolie Jacob  : 1935  MRN: [id number]  Today's Date: 2024               Admit Date: 2024     Visit Dx:    ICD-10-CM ICD-9-CM   1. Acute confusion  R41.0 293.0   2. Slurred speech  R47.81 784.59   3. Acute stroke due to ischemia  I63.9 434.91     Patient Active Problem List   Diagnosis    Visual disturbance    Ataxia    Paresthesia and pain of left extremity    Headache    Atrial fibrillation    Cardioembolic stroke    CVA (cerebral vascular accident)    H/O cardioembolic CVA     Past Medical History:   Diagnosis Date    A-fib     Arthritis     H/O cardioembolic CVA 2024    Hypertension     Stroke      Past Surgical History:   Procedure Laterality Date    APPENDECTOMY      BREAST SURGERY      CATARACT EXTRACTION       SECTION      EYE SURGERY      HEMORRHOIDECTOMY      INTRAOCULAR LENS INSERTION      TONSILLECTOMY         SLP Recommendation and Plan  SLP Diagnosis: mild-moderate, aphasia (24 1030)              SLC Criteria for Skilled Therapy Interventions Met: yes (24 1030)  Anticipated Discharge Disposition (SLP): inpatient rehabilitation facility (24 1030)        Therapy Frequency (SLP SLC): 5 days per week (24 1030)  Predicted Duration Therapy Intervention (Days): 2 weeks (24 1030)                             Plan of Care Reviewed With: patient (24 1138)  Progress:  (initial eval) (24 1138)      SLP EVALUATION (Last 72 Hours)       SLP SLC Evaluation       Row Name 24 1030                   Communication Assessment/Intervention    Document Type evaluation  -CJ        Subjective Information no complaints  -CJ        Patient Observations alert;cooperative  -CJ        Patient Effort good  -CJ        Symptoms Noted During/After Treatment none  -CJ           General Information    Patient Profile Reviewed yes   -CJ        Pertinent History Of Current Problem Pt adm on stroke pathway; sig h/o afib, CVA, ataxia.  -CJ        Precautions/Limitations, Vision WFL;for purposes of eval  -CJ        Precautions/Limitations, Hearing WFL;for purposes of eval  -CJ        Prior Level of Function-Communication WFL  -CJ        Plans/Goals Discussed with patient;agreed upon  -CJ        Barriers to Rehab none identified  -CJ        Patient's Goals for Discharge patient did not state  -CJ           Pain    Additional Documentation Pain Scale: FACES Pre/Post-Treatment (Group)  -CJ           Pain Scale: FACES Pre/Post-Treatment    Pain: FACES Scale, Pretreatment 0-->no hurt  -CJ        Posttreatment Pain Rating 0-->no hurt  -CJ           Comprehension Assessment/Intervention    Comprehension Assessment/Intervention Auditory Comprehension;Reading Comprehension  -CJ           Auditory Comprehension Assessment/Intervention    Auditory Comprehension (Communication) mild impairment  -CJ        Able to Identify Objects/Pictures (Communication) WFL;familiar objects  -CJ        Answers Questions (Communication) mild impairment;complex;yes/no  -CJ        Able to Follow Commands (Communication) mild impairment;2-step  -CJ           Reading Comprehension Assessment/Intervention    Reading Comprehension (Communication) WFL  -CJ           Expression Assessment/Intervention    Expression Assessment/Intervention verbal expression;graphic expression  -CJ           Verbal Expression Assessment/Intervention    Verbal Expression mild impairment;moderate impairment  -CJ        Automatic Speech (Communication) WFL;months of year;days of week;counting 1-20  -CJ        Repetition mild impairment;sentences  -CJ        Phrase Completion mild impairment;unpredictable  -CJ        Responsive Naming mild impairment;complex  -CJ        Confrontational Naming WFL;low frequency  -CJ        Spontaneous/Functional Words moderate impairment;complex  -CJ        Sentence  Formulation mild impairment;moderate impairment;complex  -CJ        Conversational Discourse/Fluency moderate impairment  -CJ           Graphic Expression Assessment/Intervention    Graphic Expression unable/difficult to assess  -CJ           Oral Motor Structure and Function    Oral Motor Structure and Function mild impairment  -CJ        Dentition Assessment natural, present and adequate  -CJ        Mucosal Quality moist, healthy  -CJ           Oral Musculature and Cranial Nerve Assessment    Oral Motor General Assessment WFL  -CJ           Motor Speech Assessment/Intervention    Motor Speech Function WFL  -CJ        Speech intelligibility 100%;in quiet environment;with unfamiliar listener;in connected speech  -CJ           Cognitive Assessment Intervention- SLP    Cognitive Function (Cognition) unable/difficult to assess;other (see comments)  2/2 aphasia  -CJ           SLP Evaluation Clinical Impressions    SLP Diagnosis mild-moderate;aphasia  -CJ        Rehab Potential/Prognosis good  -CJ        SLC Criteria for Skilled Therapy Interventions Met yes  -CJ        Functional Impact functional impact in ADLs;difficulty communicating wants, needs;difficulty communicating in an emergency  -CJ           Recommendations    Therapy Frequency (SLP SLC) 5 days per week  -CJ        Predicted Duration Therapy Intervention (Days) 2 weeks  -CJ        Anticipated Discharge Disposition (SLP) inpatient rehabilitation facility  -                  User Key  (r) = Recorded By, (t) = Taken By, (c) = Cosigned By      Initials Name Effective Dates    Lorna More, MS CCC-SLP 06/03/24 -                        EDUCATION  The patient has been educated in the following areas:     Cognitive Impairment Communication Impairment.           SLP GOALS       Row Name 07/21/24 1030             Patient will demonstrate functional language skills for return to discharge environment     [place] with minimal cues  -CJ      Time frame by  discharge  -CJ      Progress/Outcomes new goal  -CJ         Comprehend Questions Goal 1 (SLP)    Improve Ability to Comprehend Questions Goal 1 (SLP) complex yes/no questions;simple wh questions;90%;with minimal cues (75-90%)  -CJ      Time Frame (Comprehend Questions Goal 1, SLP) 1 week  -CJ      Progress/Outcomes (Comprehend Questions Goal 1, SLP) new goal  -CJ         Follow Directions Goal 2 (SLP)    Improve Ability to Follow Directions Goal 1 (SLP) 2 step commands;90%;with minimal cues (75-90%)  -CJ      Time Frame (Follow Directions Goal 1, SLP) 1 week  -CJ      Progress/Outcomes (Follow Directions Goal 1, SLP) new goal  -CJ         Word Retrieval Skills Goal 1 (SLP)    Improve Word Retrieval Skills By Goal 1 (SLP) responsive naming task;complex;completing open ended unstructured sentence;completing functional word finding tasks;90%;with minimal cues (75-90%)  -CJ      Time Frame (Word Retrieval Goal 1, SLP) 1 week  -CJ      Progress/Outcomes (Word Retrieval Goal 1, SLP) new goal  -CJ                User Key  (r) = Recorded By, (t) = Taken By, (c) = Cosigned By      Initials Name Provider Type    Lorna More MS CCC-SLP Speech and Language Pathologist                              Time Calculation:      Time Calculation- SLP       Row Name 07/21/24 1138             Time Calculation- SLP    SLP Start Time 1030  -      SLP Received On 07/21/24  -         Untimed Charges    56113-ER Eval Speech and Production w/ Language Minutes 41  -CJ         Total Minutes    Untimed Charges Total Minutes 41  -CJ       Total Minutes 41  -CJ                User Key  (r) = Recorded By, (t) = Taken By, (c) = Cosigned By      Initials Name Provider Type    Lorna More MS CCC-SLP Speech and Language Pathologist                    Therapy Charges for Today       Code Description Service Date Service Provider Modifiers Qty    77635900244 HC ST EVAL SPEECH AND PROD W LANG  3 7/21/2024 Lorna Mckeon MS CCC-SLP GN  1                       Lorna Mckeon, MS CCC-SLP  7/21/2024

## 2024-07-21 NOTE — PROGRESS NOTES
HEPARIN INFUSION  Yolie Jacob is a  89 y.o. female receiving heparin infusion.     Therapy for (VTE/Cardiac): Cardiac  Patient Weight: 62.6 kg  Initial Bolus (Y/N):  N  Any Bolus (Y/N): No bolus ever        Signs or Symptoms of Bleeding: None    Cardiac or Other (Not VTE)   Initial Bolus: 60 units/kg (Max 4,000 units)  Initial rate: 12 units/kg/hr (Max 1,000 units/hr)   Anti Xa Rebolus Infusion Hold time Change infusion Dose (Units/kg/hr) Next Anti Xa or aPTT Level Due   < 0.11 50 Units/kg  (4000 Units Max) None Increase by  3 Units/kg/hr 6 hours   0.11- 0.19 25 Units/kg  (2000 Units Max) None Increase by  2 Units/kg/hr 6 hours   0.2 - 0.29 0 None Increase by  1 Units/kg/hr 6 hours   0.3 - 0.5 0 None No Change 6 hours (after 2 consecutive levels in range check qAM)   0.51 - 0.6 0 None Decrease by  1 Units/kg/hr 6 hours   0.61 - 0.8 0 30 Minutes Decrease by  2 Units/kg/hr 6 hours   0.81 - 1 0 60 Minutes Decrease by  3 Units/kg/hr 6 hours   >1 0 Hold  After Anti Xa less than 0.5 decrease previous rate by  4 Units/kg/hr  Every 2 hours until Anti Xa  less than 0.5 then when infusion restarts in 6 hours       Results from last 7 days   Lab Units 07/21/24  1246 07/20/24  2326 07/20/24  2313 07/20/24  2312   INR  1.02  --   --  1.1   HEMOGLOBIN g/dL 14.0 15.1  --   --    HEMOGLOBIN, POC g/dL  --   --  15.6  --    HEMATOCRIT % 41.1 46.5  --   --    HEMATOCRIT POC %  --   --  46  --    PLATELETS 10*3/mm3 206 235  --   --           Date   Time   Anti-Xa Current Rate (Unit/kg/hr) Bolus   (Units) Rate Change   (Unit/kg/hr) New Rate (Unit/kg/hr) Next   Anti-Xa Comments  Pump Check Daily   7/21 1246 0.10 -- -- +12 12 2000 D/w STEVEN Mejia. Will use anti-Xa levels given apparent noncompliance with Eliquis (aXa is baseline)                                                                                                                                                                                                                                  Crow Jernigan, PharmD  7/21/2024  15:11 EDT

## 2024-07-21 NOTE — CONSULTS
Stroke Consult Note    Patient Name: Yolie Jacob   MRN: [id number]  Age: 89 y.o.  Sex: female  : 1935    Primary Care Physician: Marin Castañeda DO  Referring Physician:  Dr. Cabrera     TIME STROKE TEAM CALLED:  EST     TIME PATIENT SEEN:  EST    Handedness: Right  Race:      Chief Complaint/Reason for Consultation: AMS, Speech Difficulties     Subjective .  HPI:     Yolie Jacob 89 year old  female with PMH significant for atrial fibrillation (Eliquis), macular degeneration, arthritis, and Stroke ( R PCA territory infarct). She is brought to the ED this evening with her son. He reports that last night around [id number] he noted her to have left facial drooping, confusion, balance disturbance, and speech difficulties. Unfortunately secondary to her confusion the patient is unable to provide any relevant medical history. Family reports that he did not see the patient again until this afternoon around 1500, he reports that her facial droop was not there any longer however the rest of her symptoms had continued. Unsure of her home dose of Eliquis at this time and son reports she normally manages her medications at home independently. Family also reports that she has had increased difficulty with her vision over the past few days as well. On examination She is able to tell me her name, but is unsure of her location or the year. She denies any headache at this time. She does not have any notable weakness in any extremity. She is able to intermittently follow one step commands. Unable to identify objects. She has delayed blink to threat. She will be admitted to [facility] for further work-up and evaluation under the hospital medicine team.     Last Known Normal Date/Time: Unclear     Review of Systems   Unable to perform ROS: Mental status change      Past Medical History:   Diagnosis Date    A-fib     Arthritis     Hypertension     Stroke      Past Surgical History:   Procedure  Laterality Date    APPENDECTOMY      BREAST SURGERY      CATARACT EXTRACTION       SECTION      EYE SURGERY      HEMORRHOIDECTOMY      INTRAOCULAR LENS INSERTION      TONSILLECTOMY       No family history on file.  Social History     Socioeconomic History    Marital status:    Tobacco Use    Smoking status: Never    Smokeless tobacco: Never   Vaping Use    Vaping status: Never Used   Substance and Sexual Activity    Alcohol use: No    Drug use: No    Sexual activity: Defer     Allergies   Allergen Reactions    Procaine Anaphylaxis    Codeine Other (See Comments)     Passed out    Latex Itching    Morphine Itching    Penicillins Itching    Sulfa Antibiotics Hives    Doxycycline Palpitations    Keflex [Cephalexin] Palpitations     Prior to Admission medications    Medication Sig Start Date End Date Taking? Authorizing Provider   acetaminophen (TYLENOL) 325 MG tablet Take 2 tablets by mouth Every 4 (Four) Hours As Needed for Mild Pain . 22   Carol Orta PA-C   apixaban (ELIQUIS) 2.5 MG tablet tablet Eliquis 2.5 mg tablet   Two times a day    Provider, MD Dora   atorvastatin (LIPITOR) 80 MG tablet Take 1 tablet by mouth Every Night. 22   Carol Orta PA-C   b complex-vitamin c-folic acid (NEPHRO-IRMA) 0.8 MG tablet tablet Take 1 tablet by mouth Daily.    Emergency, Nurse Epic, RN   Multiple Vitamins-Minerals (EYE VITAMINS PO) Take  by mouth.    Emergency, Nurse Epic, RN     Objective     Temp:  [98.7 °F (37.1 °C)] 98.7 °F (37.1 °C)  Heart Rate:  [98] 98  Resp:  [18] 18  BP: (147)/(100) 147/100    Neurological Exam  Mental Status  Awake and alert. Oriented only to person. Expressive aphasia present.    Cranial Nerves  CN II: Very difficult to assess, very delayed blink to threat bilaterally.   Previously documented vision deficits from prior CVA. .  CN III, IV, VI: Extraocular movements intact bilaterally.  CN VII:  Right: There is no facial weakness.  Left: There is no  facial weakness.  CN VIII: Hearing appears to be intact bilaterally   .  CN IX, X:  Right: Palate is normal.  Left: Palate is normal.    Motor  Normal muscle bulk throughout. No fasciculations present. Strength is 5/5 throughout all four extremities.    Sensory Sensation level: Withdrawals to pain in all 4 extremities .    Coordination    No dysmetria to note with finger to nose .    Gait    Unable to fully assess, but she is able to ambulate from CT table to the wheelchair with one assistance .      Physical Exam  Constitutional:       General: She is awake. She is not in acute distress.     Appearance: Normal appearance.   HENT:      Head: Normocephalic and atraumatic.      Mouth/Throat:      Pharynx: Oropharynx is clear.   Eyes:      Extraocular Movements: Extraocular movements intact.   Cardiovascular:      Rate and Rhythm: Normal rate and regular rhythm.   Skin:     General: Skin is warm and dry.   Neurological:      Mental Status: She is alert. She is disoriented.      Motor: Motor strength is normal.No weakness.   Psychiatric:         Mood and Affect: Mood is anxious.         Speech: Speech is delayed.         Behavior: Behavior is cooperative.         Cognition and Memory: Cognition is impaired. Memory is impaired.       Acute Stroke Data    IV Thrombolytic (TPA/Tenecteplase) Inclusion / Exclusion Criteria    Time: 23:19 EDT  Person Administering Scale: MEGHAN Alicia    Inclusion Criteria  [x]   18 years of age or greater   []   Onset of symptoms < 4.5 hours before beginning treatment (stroke onset = time patient was last seen well or without symptoms).   []   Diagnosis of acute ischemic stroke causing measurable disabling deficit (Complete Hemianopia, Any Aphasia, Visual or Sensory Extinction, Any weakness limiting sustained effort against gravity)   []   Any remaining deficit considered potentially disabling in view of patient and practitioner   Exclusion criteria (Do not proceed with Alteplase  if any are checked under exclusion criteria)  [x]   Onset unknown or GREATER than 4.5 hours   []   ICH on CT/MRI   []   CT demonstrates hypodensity representing acute or subacute infarct   []   Significant head trauma or prior stroke in the previous 3 months   []   Symptoms suggestive of subarachnoid hemorrhage   []   History of un-ruptured intracranial aneurysm GREATER than 10 mm   []   Recent intracranial or intraspinal surgery within the last 3 months   []   Arterial puncture at a non-compressible site in the previous 7 days   []   Active internal bleeding   []   Acute bleeding tendency   []   Platelet count LESS than 100,000 for known hematological diseases such as leukemia, thrombocytopenia or chronic cirrhosis   []   Current use of anticoagulant with INR GREATER than 1.7 or PT GREATER than 15 seconds, aPTT GREATER than 40 seconds   []   Heparin received within 48 hours, resulting in abnormally elevated aPTT GREATER than upper limit of normal   []   Current use of direct thrombin inhibitors or direct factor Xa inhibitors in the past 48 hours   []   Elevated blood pressure refractory to treatment (systolic GREATER than 185 mm/Hg or diastolic  GREATER than 110 mm/Hg   []   Suspected infective endocarditis and aortic arch dissection   []   Current use of therapeutic treatment dose of low-molecular-weight heparin (LMWH) within the previous 24 hours   []   Structural GI malignancy or bleed   Relative exclusion for all patients  []   Only minor nondisabling symptoms   []   Pregnancy   []   Seizure at onset with postictal residual neurological impairments   []   Major surgery or previous trauma within past 14 days   []   History of previous spontaneous ICH, intracranial neoplasm, or AV malformation   []   Postpartum (within previous 14 days)   []   Recent GI or urinary tract hemorrhage (within previous 21 days)   []   Recent acute MI (within previous 3 months)   []   History of unruptured intracranial aneurysm LESS  than 10 mm   []   History of ruptured intracranial aneurysm   []   Blood glucose LESS than 50 mg/dL (2.7 mmol/L)   []   Dural puncture within the last 7 days   []   Known GREATER than 10 cerebral microbleeds   Additional exclusions for patients with symptoms onset between 3 and 4.5 hours.  [x]   Age > 80.   []   On any anticoagulants regardless of INR  >>> Warfarin (Coumadin), Heparin, Enoxaparin (Lovenox), fondaparinux (Arixtra), bivalirudin (Angiomax), Argatroban, dabigatran (Pradaxa), rivaroxaban (Xarelto), or apixaban (Eliquis)   []   Severe stroke (NIHSS > 25).   []   History of BOTH diabetes and previous ischemic stroke.   []   The risks and benefits have been discussed with the patient or family related to the administration of IV alteplase for stroke symptoms.   []   I have discussed and reviewed the patient's case and imaging with the attending prior to IV Thrombolytic (TPA/Tenecteplase).   N/A Time Thrombolytic administered       Hospital Meds:  Scheduled-   Infusions- No current facility-administered medications for this encounter.     PRNs-     Functional Status Prior to Current Stroke/Demetrius Score: 1    NIH Stroke Scale  Time: 23:19 EDT  Person Administering Scale: MEGHAN Alicia    1a  Level of consciousness: 0=alert; keenly responsive   1b. LOC questions:  2=Performs neither task correctly   1c. LOC commands: 2=Performs neither task correctly   2.  Best Gaze: 0=normal   3.  Visual: 1=Partial hemianopia   4. Facial Palsy: 0=Normal symmetric movement   5a.  Motor left arm: 0=No drift, limb holds 90 (or 45) degrees for full 10 seconds   5b.  Motor right arm: 0=No drift, limb holds 90 (or 45) degrees for full 10 seconds   6a. motor left le=No drift, limb holds 90 (or 45) degrees for full 10 seconds   6b  Motor right le=No drift, limb holds 90 (or 45) degrees for full 10 seconds   7. Limb Ataxia: 0=Absent   8.  Sensory: 0=Normal; no sensory loss   9. Best Language:  1=Mild to moderate  aphasia; some obvious loss of fluency or facility of comprehension without significant limitation on ideas expressed or form of expression.   10. Dysarthria: 0=Normal   11. Extinction and Inattention: 0=No abnormality    Total:   6     Results Reviewed:  I have personally reviewed current lab, radiology, and data and agree with results.    CT Head Without Contrast Stroke Protocol    Result Date: 7/20/2024  CT HEAD WO CONTRAST STROKE PROTOCOL Date of Exam: 7/20/2024 11:05 PM EDT Indication: confusion. Comparison: None available. Technique: Axial CT images were obtained of the head without contrast administration.  Reconstructed coronal images were also obtained. Automated exposure control and iterative construction methods were used. Scan Time: 2307 Results discussed with Rep. from the stroke team at 11:16 p.m. Findings: There is mild diffuse generalized atrophy. There are low-attenuation areas in the periventricular white matter consistent with chronic microvascular ischemic change. There is no mass, mass effect or midline shift. There are no abnormal extra-axial fluid collections or areas of acute hemorrhage. The paranasal sinuses are clear. The mastoid air cells are clear.     Impression: Impression: Atrophy and chronic microvascular ischemic change. No acute intracranial process. Electronically Signed: Familia Karimi MD  7/20/2024 11:16 PM EDT  Workstation ID: [id number]     CT CEREBRAL PERFUSION W WO CONTRAST     Date of Exam: 7/20/2024 11:06 PM EDT     Indication: confusion.     Comparison: CT scan of the head dated 7/1/2022; MRI brain 7/2/2022     Technique: Axial CT images of the brain were obtained prior to and after the administration of 115 mL Isovue-370. Core blood volume, core blood flow, mean transit time, and Tmax images were obtained utilizing the Rapid software protocol. A limited CT   angiogram of the head was also performed to measure the blood vessel density.     The radiation dose reduction device  was turned on for each scan per the ALARA (As Low as Reasonably Achievable) protocol.        Findings:  Cerebral blood flow, blood volume and mean transit time maps are symmetric without large perfusion defect.     CBF<30% volume: 0 mL  Tmax>6sec volume: 17 mL within the left parietal lobe in the distribution of the left middle cerebral artery.  Mismatch volume: 17 mL  Mismatch ratio: Infinite     IMPRESSION:  Impression:  17 mL of ischemia in the left parietal lobe in the distribution of the left middle cerebral artery.         Electronically Signed: Familia Karimi MD    7/21/2024 12:06 AM EDT    Workstation ID: [id number]    CT ANGIOGRAM HEAD W AI ANALYSIS OF LVO, CT ANGIOGRAM NECK     Date of Exam: 7/20/2024 11:06 PM EDT     Indication: confusion  Acute Stroke.     Comparison: CT angiogram of the head and neck from 7/1/2022     Technique: CTA of the head was performed after the uneventful intravenous administration of 115 mL Isovue-370. Reconstructed coronal and sagittal images were also obtained. In addition, a 3-D volume rendered image was created for interpretation.   Automated exposure control and iterative reconstruction methods were used.     Findings:  Aortic arch: The aortic arch is unremarkable.  There is conventional 3 vessel arch anatomy.  The right brachiocephalic and visualized bilateral subclavian arteries are within normal limits.     Right carotid: The right CCA arises as expected from the brachiocephalic trunk.  The CCA follows a normal course and appears normal caliber.  The carotid bifurcation is unremarkable.  The external carotid artery and distal branches appear within normal   limits.  The cervical internal carotid artery follows a normal course and appears normal caliber throughout the neck and into the head.  The intracranial ICA segments appear within normal limits.  The ophthalmic artery origin is normal.  The A1 and M1   segments appear within normal limits.  The visualized distal MANN and  MCA branches appear patent.  There is  a patent  anterior communicating artery. There is a patent  posterior communicating artery.     Left carotid: The left CCA arises as expected from the aortic arch.   The CCA follows a normal course and appears normal caliber.  The carotid bifurcation is unremarkable.  The external carotid artery and distal branches appear within normal limits.  The   cervical internal carotid artery follows a normal course and appears normal caliber throughout the neck and into the head. There is fusiform aneurysmal enlargement of the supraclinoid portion of the left internal carotid artery up to 8 mm, similar to   the prior study. The ophthalmic artery origin is normal.  The A1 and M1 segments appear within normal limits. There is slight irregularity of the left anterior cerebral artery consistent with intracranial atherosclerosis. There is slight narrowing of the   proximal left M3 branch of the middle cerebral artery without occlusion. There is truncation of the distal left M4. There is a patent  posterior communicating artery.     Posterior circulation: Vertebral arteries are patent skull base. Basilar artery is patent without focal stenosis or occlusion. The bilateral superior cerebellar arteries appear patent. There is fetal-type origin of the left posterior cerebral artery.   This arises from the aneurysmal segment of  the left internal carotid artery. There is irregularity of the distal branch of the left posterior cerebral artery compatible with intracranial atherosclerosis. There is occlusion of the P2 segment of the right posterior cerebral artery. No posterior   circulation aneurysm identified.     Nonvascular findings: Intracranial structures appear unremarkable.  Orbits are within normal limits.  Paranasal sinuses and mastoid air cells appear well aerated.  Superficial soft tissues and underlying musculature appear within normal limits.  The lung   apices are clear.  The thyroid  and salivary glands appear unremarkable.  The suprahyoid and infrahyoid spaces of the neck appear unremarkable.  There is no evidence of cervical lymphadenopathy or a neck mass.  There are no acute osseous abnormalities or   destructive bone lesions.     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  2.Occlusion of the P2 segment of the right posterior cerebral artery.  3.Occlusion of the distal left M4 branch of the middle cerebral artery.  4.Intracranial atherosclerosis.     Electronically Signed: Familia Karimi MD    7/21/2024 12:23 AM EDT    Workstation ID: [id number]    Lab Results   Component Value Date    GLUCOSE 92 05/05/2024    BUN 12 05/05/2024    CREATININE 0.70 07/20/2024    EGFR 82.8 07/20/2024    BCR 19.7 05/05/2024    K 3.7 05/05/2024    CO2 24.0 05/05/2024    CALCIUM 8.8 05/05/2024    ALBUMIN 3.8 05/05/2024    BILITOT 0.4 05/05/2024    AST 27 05/05/2024    ALT 25 05/05/2024     WBC   Date Value Ref Range Status   07/20/2024 5.37 3.40 - 10.80 10*3/mm3 Final     RBC   Date Value Ref Range Status   07/20/2024 5.23 3.77 - 5.28 10*6/mm3 Final     Hemoglobin   Date Value Ref Range Status   07/20/2024 15.1 12.0 - 15.9 g/dL Final   07/20/2024 15.6 12.0 - 17.0 g/dL Final     Comment:     Serial Number: [id number]:  [id number]     Hematocrit   Date Value Ref Range Status   07/20/2024 46.5 34.0 - 46.6 % Final   07/20/2024 46 38 - 51 % Final     MCV   Date Value Ref Range Status   07/20/2024 88.9 79.0 - 97.0 fL Final     MCH   Date Value Ref Range Status   07/20/2024 28.9 26.6 - 33.0 pg Final     MCHC   Date Value Ref Range Status   07/20/2024 32.5 31.5 - 35.7 g/dL Final     RDW   Date Value Ref Range Status   07/20/2024 13.8 12.3 - 15.4 % Final     RDW-SD   Date Value Ref Range Status   07/20/2024 44.6 37.0 - 54.0 fl Final     MPV   Date Value Ref Range Status   07/20/2024 10.8 6.0 - 12.0 fL Final     Platelets   Date Value Ref Range Status   07/20/2024  235 140 - 450 10*3/mm3 Final     Neutrophil %   Date Value Ref Range Status   07/20/2024 74.0 42.7 - 76.0 % Final     Lymphocyte %   Date Value Ref Range Status   07/20/2024 13.4 (L) 19.6 - 45.3 % Final     Monocyte %   Date Value Ref Range Status   07/20/2024 8.9 5.0 - 12.0 % Final     Eosinophil %   Date Value Ref Range Status   07/20/2024 2.2 0.3 - 6.2 % Final     Basophil %   Date Value Ref Range Status   07/20/2024 0.4 0.0 - 1.5 % Final     Immature Grans %   Date Value Ref Range Status   07/20/2024 1.1 (H) 0.0 - 0.5 % Final     Neutrophils, Absolute   Date Value Ref Range Status   07/20/2024 3.97 1.70 - 7.00 10*3/mm3 Final     Lymphocytes, Absolute   Date Value Ref Range Status   07/20/2024 0.72 0.70 - 3.10 10*3/mm3 Final     Monocytes, Absolute   Date Value Ref Range Status   07/20/2024 0.48 0.10 - 0.90 10*3/mm3 Final     Eosinophils, Absolute   Date Value Ref Range Status   07/20/2024 0.12 0.00 - 0.40 10*3/mm3 Final     Basophils, Absolute   Date Value Ref Range Status   07/20/2024 0.02 0.00 - 0.20 10*3/mm3 Final     Immature Grans, Absolute   Date Value Ref Range Status   07/20/2024 0.06 (H) 0.00 - 0.05 10*3/mm3 Final     nRBC   Date Value Ref Range Status   07/20/2024 0.0 0.0 - 0.2 /100 WBC Final       Assessment/Plan:  89 year old  female with multiple vascular risk factors who presented to [facility] ED with her son for intermittent neurologic symptoms including AMS, LFD, speech difficulties, and balance disturbance. She is not deemed to be an appropriate IV thrombolytic therapy candidate secondary to unclear LKW. Not deemed to be an appropriate emergent endovascular therapy candidate secondary to lack of attainable LVO on CT perfusion imaging.    Antiplatelet PTA: none  Anticoagulant PTA: Eliquis, unsure of compliance      AMS, Aphasia, Dysarthria   Suspect left hemispheric stroke in the setting of distal left M4 branch occlusion   -CVA orderset  -Beside dysphagia screening prior to PO  intake  -Nursing communication: Please call and clarify home medications and doses, suspect medication noncompliance  -Lipitor 80mg nightly   -Add ASA 81mg at this time in the setting of multifocal ICAD  -Hold home Eliquis at this time until MRI is completed   -Allow for autoregulation of blood pressure overnight secondary to prolonged symptoms presentation, overall management per Hospital Medicine Team   -MRI brain wo contrast, routine   -TTE wo bubble  -FLP and A1c in   -SLP/PT/OT to see and assess in AM   -CM assistance with DC planning   -NIHSS and neuro checks     Stroke Neurology will continue to follow. Plan of care discussed with ED MD. Thanks for the consult in the care of this patient. Please call with any further questions or concerns.     Keisha Sparks, APRN  July 20, 2024  23:19 EDT

## 2024-07-22 ENCOUNTER — APPOINTMENT (OUTPATIENT)
Dept: MRI IMAGING | Facility: HOSPITAL | Age: 89
End: 2024-07-22
Payer: MEDICARE

## 2024-07-22 LAB
BASOPHILS # BLD AUTO: 0.02 10*3/MM3 (ref 0–0.2)
BASOPHILS NFR BLD AUTO: 0.4 % (ref 0–1.5)
DEPRECATED RDW RBC AUTO: 44.2 FL (ref 37–54)
EOSINOPHIL # BLD AUTO: 0.1 10*3/MM3 (ref 0–0.4)
EOSINOPHIL NFR BLD AUTO: 1.9 % (ref 0.3–6.2)
ERYTHROCYTE [DISTWIDTH] IN BLOOD BY AUTOMATED COUNT: 13.7 % (ref 12.3–15.4)
HCT VFR BLD AUTO: 41.5 % (ref 34–46.6)
HGB BLD-MCNC: 13.9 G/DL (ref 12–15.9)
IMM GRANULOCYTES # BLD AUTO: 0.03 10*3/MM3 (ref 0–0.05)
IMM GRANULOCYTES NFR BLD AUTO: 0.6 % (ref 0–0.5)
LYMPHOCYTES # BLD AUTO: 0.78 10*3/MM3 (ref 0.7–3.1)
LYMPHOCYTES NFR BLD AUTO: 14.6 % (ref 19.6–45.3)
MCH RBC QN AUTO: 29.4 PG (ref 26.6–33)
MCHC RBC AUTO-ENTMCNC: 33.5 G/DL (ref 31.5–35.7)
MCV RBC AUTO: 87.7 FL (ref 79–97)
MONOCYTES # BLD AUTO: 0.56 10*3/MM3 (ref 0.1–0.9)
MONOCYTES NFR BLD AUTO: 10.5 % (ref 5–12)
NEUTROPHILS NFR BLD AUTO: 3.84 10*3/MM3 (ref 1.7–7)
NEUTROPHILS NFR BLD AUTO: 72 % (ref 42.7–76)
NRBC BLD AUTO-RTO: 0 /100 WBC (ref 0–0.2)
PLATELET # BLD AUTO: 195 10*3/MM3 (ref 140–450)
PMV BLD AUTO: 10.7 FL (ref 6–12)
RBC # BLD AUTO: 4.73 10*6/MM3 (ref 3.77–5.28)
UFH PPP CHRO-ACNC: 0.16 IU/ML (ref 0.3–0.7)
UFH PPP CHRO-ACNC: 0.22 IU/ML (ref 0.3–0.7)
UFH PPP CHRO-ACNC: 0.42 IU/ML (ref 0.3–0.7)
WBC NRBC COR # BLD AUTO: 5.33 10*3/MM3 (ref 3.4–10.8)

## 2024-07-22 PROCEDURE — 25010000002 HEPARIN (PORCINE) 25000-0.45 UT/250ML-% SOLUTION

## 2024-07-22 PROCEDURE — 99232 SBSQ HOSP IP/OBS MODERATE 35: CPT | Performed by: STUDENT IN AN ORGANIZED HEALTH CARE EDUCATION/TRAINING PROGRAM

## 2024-07-22 PROCEDURE — 99232 SBSQ HOSP IP/OBS MODERATE 35: CPT

## 2024-07-22 PROCEDURE — 85520 HEPARIN ASSAY: CPT

## 2024-07-22 PROCEDURE — 70551 MRI BRAIN STEM W/O DYE: CPT

## 2024-07-22 PROCEDURE — 85025 COMPLETE CBC W/AUTO DIFF WBC: CPT

## 2024-07-22 RX ADMIN — Medication 10 ML: at 19:49

## 2024-07-22 RX ADMIN — Medication 10 ML: at 08:45

## 2024-07-22 RX ADMIN — HEPARIN SODIUM 18 UNITS/KG/HR: 10000 INJECTION, SOLUTION INTRAVENOUS at 16:06

## 2024-07-22 RX ADMIN — ATORVASTATIN CALCIUM 80 MG: 40 TABLET, FILM COATED ORAL at 19:49

## 2024-07-22 NOTE — CASE MANAGEMENT/SOCIAL WORK
Continued Stay Note  [facility]     Patient Name: Yolie Jacob  MRN: [id number]  Today's Date: 7/22/2024    Admit Date: 7/20/2024    Plan: [facility]   Discharge Plan       Row Name 07/22/24 1447       Plan    Plan [facility]    Patient/Family in Agreement with Plan yes    Provided Post Acute Provider List? Yes    Post Acute Provider List Inpatient Rehab    Provided Post Acute Provider Quality & Resource List? Yes    Post Acute Provider Quality and Resource List Inpatient Rehab    Delivered To Support Person    Support Person Memo    Method of Delivery Telephone    Plan Comments Spoke with son Memo by phone. DIANNA gave Memo a list of rehab facilities in [place] and he chose [facility]. CM called Chris at [facility] and made the referral. CM will continue to follow.    Final Discharge Disposition Code 62 - inpatient rehab facility                   Discharge Codes    No documentation.                 Expected Discharge Date and Time       Expected Discharge Date Expected Discharge Time    Jul 23, 2024               Bassam Chester RN

## 2024-07-22 NOTE — CASE MANAGEMENT/SOCIAL WORK
Discharge Planning Assessment  [facility]     Patient Name: Yolie Jacob  MRN: [id number]  Today's Date: 7/22/2024    Admit Date: 7/20/2024    Plan: Home vs reahb   Discharge Needs Assessment       Row Name 07/22/24 0925       Living Environment    People in Home child(amy), adult    Name(s) of People in Home Memo (son) [phone]    Current Living Arrangements home    Potentially Unsafe Housing Conditions none    Primary Care Provided by self    Provides Primary Care For no one    Family Caregiver if Needed child(amy), adult    Able to Return to Prior Arrangements yes       Resource/Environmental Concerns    Resource/Environmental Concerns none    Transportation Concerns none       Transition Planning    Patient/Family Anticipates Transition to inpatient rehabilitation facility    Patient/Family Anticipated Services at Transition none    Transportation Anticipated family or friend will provide       Discharge Needs Assessment    Readmission Within the Last 30 Days no previous admission in last 30 days    Equipment Currently Used at Home walker, rolling;wheelchair    Concerns to be Addressed denies needs/concerns at this time    Anticipated Changes Related to Illness none    Equipment Needed After Discharge none                   Discharge Plan       Row Name 07/22/24 0972       Plan    Plan Home vs reahb    Patient/Family in Agreement with Plan yes    Plan Comments Spoke with patient at bedside. Lives with Memo (son) [phone] in [place]. Is somewhat independent with ADL's. No problems with Medicare or medications. Uses a rolling walker and wheelchair at home. No advanced directives. PCP is Marin Zhao DO. Plan is home vs rehab. Son can transport. CM will continue to follow.    Final Discharge Disposition Code 01 - home or self-care                  Continued Care and Services - Admitted Since 7/20/2024    No active coordination exists for this encounter.       Expected Discharge Date and Time        Expected Discharge Date Expected Discharge Time    Jul 23, 2024            Demographic Summary       Row Name 07/22/24 0924       General Information    Admission Type inpatient    Arrived From emergency department    Referral Source admission list    Reason for Consult discharge planning    Preferred Language English       Contact Information    Permission Granted to Share Info With     Contact Information Obtained for                    Functional Status       Row Name 07/22/24 0924       Functional Status    Usual Activity Tolerance fair    Current Activity Tolerance fair       Functional Status, IADL    Medications independent    Meal Preparation independent    Housekeeping assistive person    Laundry assistive person    Shopping assistive person       Mental Status    General Appearance WDL WDL       Mental Status Summary    Recent Changes in Mental Status/Cognitive Functioning no changes       Employment/    Employment Status retired                   Psychosocial    No documentation.                  Abuse/Neglect    No documentation.                  Legal    No documentation.                  Substance Abuse    No documentation.                  Patient Forms    No documentation.                     Bassam Chester RN

## 2024-07-22 NOTE — CONSULTS
Diabetes Education    Patient Name:  Yolie Jacob  YOB: 1935  MRN: [id number]  Admit Date:  7/20/2024      Order criteria not met for diabetes education consult. Current A1c is 5.1, noted during chart review. Pt has no history of DM and no home meds for DM. Thank you.        Electronically signed by:  Che Ontiveros RN  07/22/24 08:21 EDT

## 2024-07-22 NOTE — PROGRESS NOTES
[facility] Medicine Services  PROGRESS NOTE    Patient Name: Yolie Jacob  : 1935  MRN: [id number]    Date of Admission: 2024  Primary Care Physician: Marin Castañeda DO    Subjective   Subjective     CC:  Abnormal speech     HPI:  Patient reports continued decreased vision in R visual field but admits to improvement in speech.       Objective   Objective     Vital Signs:   Temp:  [97 °F (36.1 °C)-97.8 °F (36.6 °C)] 97.2 °F (36.2 °C)  Heart Rate:  [77-83] 77  Resp:  [16-18] 18  BP: (133-154)/(71-93) 142/83     Physical Exam:  General appearance: alert, awake, oriented, no acute distress   Cardiovascular: RRR, no murmurs or rubs, radial pulse full 2/4 BL   Respiratory: CTAB, no crackles or wheezes   Abdomen: soft, non-tender, no organomegaly, bowel sounds normoactive    Neuro/CNS: alert and oriented x3, expressive aphasia, right hemineglect    Results Reviewed:  LAB RESULTS:      Lab 24  1023 24  0336 24  1937 24  1246 24  2326 24  2313 24  2312   WBC  --  5.33  --  5.16 5.37  --   --    HEMOGLOBIN  --  13.9  --  14.0 15.1  --   --    HEMOGLOBIN, POC  --   --   --   --   --  15.6  --    HEMATOCRIT  --  41.5  --  41.1 46.5  --   --    HEMATOCRIT POC  --   --   --   --   --  46  --    PLATELETS  --  195  --  206 235  --   --    NEUTROS ABS  --  3.84  --  4.01 3.97  --   --    IMMATURE GRANS (ABS)  --  0.03  --  0.01 0.06*  --   --    LYMPHS ABS  --  0.78  --  0.57* 0.72  --   --    MONOS ABS  --  0.56  --  0.48 0.48  --   --    EOS ABS  --  0.10  --  0.07 0.12  --   --    MCV  --  87.7  --  85.8 88.9  --   --    PROTIME  --   --   --  13.5  --   --  13.1   APTT  --   --   --  26.7*  --   --   --    HEPARIN ANTI-XA 0.22* 0.16* 0.10* 0.10*  --   --   --          Lab 24  1234 24  2326 24  2313   SODIUM  --  138  --    POTASSIUM  --  3.6  --    CHLORIDE  --  104  --    CO2  --  22.0  --    ANION GAP  --  12.0  --     BUN  --  13  --    CREATININE  --  0.71 0.70   EGFR  --  81.4 82.8   GLUCOSE  --  131*  --    CALCIUM  --  8.9  --    HEMOGLOBIN A1C 5.10  --   --          Lab 07/20/24 2326   TOTAL PROTEIN 6.4   ALBUMIN 3.9   GLOBULIN 2.5   ALT (SGPT) 21   AST (SGOT) 26   BILIRUBIN 0.4   ALK PHOS 105         Lab 07/21/24  1246 07/20/24  2326 07/20/24  2312   HSTROP T  --  13  --    PROTIME 13.5  --  13.1   INR 1.02  --  1.1         Lab 07/21/24  1234   CHOLESTEROL 231*   LDL CHOL 156*   HDL CHOL 40   TRIGLYCERIDES 188*             Brief Urine Lab Results  (Last result in the past 365 days)        Color   Clarity   Blood   Leuk Est   Nitrite   Protein   CREAT   Urine HCG        07/21/24 0016 Yellow   Clear   Negative   Negative   Negative   Negative                   Microbiology Results Abnormal       Procedure Component Value - Date/Time    COVID PRE-OP / PRE-PROCEDURE SCREENING ORDER (NO ISOLATION) - Swab, Nasopharynx [[id number]]  (Normal) Collected: 07/20/24 2342    Lab Status: Final result Specimen: Swab from Nasopharynx Updated: 07/21/24 0149    Narrative:      The following orders were created for panel order COVID PRE-OP / PRE-PROCEDURE SCREENING ORDER (NO ISOLATION) - Swab, Nasopharynx.  Procedure                               Abnormality         Status                     ---------                               -----------         ------                     COVID-19 and FLU A/B PCR...[[id number]]  Normal              Final result                 Please view results for these tests on the individual orders.    COVID-19 and FLU A/B PCR, 1 HR TAT - Swab, Nasopharynx [[id number]]  (Normal) Collected: 07/20/24 2342    Lab Status: Final result Specimen: Swab from Nasopharynx Updated: 07/21/24 0149     COVID19 Not Detected     Influenza A PCR Not Detected     Influenza B PCR Not Detected    Narrative:      Fact sheet for providers: https://www.fda.gov/media/920883/download    Fact sheet for patients:  https://www.fda.gov/media/160377/download    Test performed by PCR.            MRI Brain Without Contrast    Result Date: 7/22/2024  MRI BRAIN WO CONTRAST Date of Exam: 7/22/2024 4:57 AM EDT Indication: Stroke, follow up stroke.  Comparison: CT scan the head dated 7/20/2024 Technique:  Routine multiplanar/multisequence sequence images of the brain were obtained without contrast administration. Findings: There is mild diffuse generalized atrophy. There are areas of increased T2 and FLAIR signal throughout the bilateral periventricular and subcortical white matter consistent with chronic microvascular ischemia. There is pathologic restricted diffusion in the posterior left parietal lobe in the distribution of the left middle cerebral artery. There is no evidence of an acute hemorrhage. There is no mass or mass effect. There are no abnormal extra-axial fluid collections.     Impression: Impression: Acute infarct in the distribution of the left middle cerebral artery. Electronically Signed: Familia Karimi MD  7/22/2024 6:29 AM EDT  Workstation ID: [id number]    XR Abdomen KUB    Result Date: 7/21/2024  XR ABDOMEN KUB Date of Exam: 7/21/2024 9:37 PM EDT Indication: mri clearance Comparison: None available. Findings: No gross free air or pneumatosis though evaluation is slightly limited due to technique. There is a non obstructive bowel gas pattern. A normal stool burden is present.  No suspicious calcifications identified. No acute osseous abnormality identified. There are clips from cholecystectomy. There are no metallic foreign bodies to prevent MRI. Review of the patient's chest x-ray also demonstrates no evidence of metallic foreign body to prevent MRI.     Impression: Impression: No acute abnormality. No metallic foreign body to prevent MRI. Electronically Signed: Familia Karimi MD  7/21/2024 10:48 PM EDT  Workstation ID: [id number]    Adult Transthoracic Echo Complete W/ Cont if Necessary Per Protocol (With Agitated  Saline)    Result Date: 7/21/2024    Left ventricular wall thickness is consistent with mild concentric hypertrophy.   Estimated right ventricular systolic pressure from tricuspid regurgitation is normal (<35 mmHg).   Mild dilation of the ascending aorta is present.     CT Angiogram Neck    Result Date: 7/21/2024  CT ANGIOGRAM HEAD W AI ANALYSIS OF LVO, CT ANGIOGRAM NECK Date of Exam: 7/20/2024 11:06 PM EDT Indication: confusion Acute Stroke. Comparison: CT angiogram of the head and neck from 7/1/2022 Technique: CTA of the head was performed after the uneventful intravenous administration of 115 mL Isovue-370. Reconstructed coronal and sagittal images were also obtained. In addition, a 3-D volume rendered image was created for interpretation. Automated exposure control and iterative reconstruction methods were used. Findings: Aortic arch: The aortic arch is unremarkable.  There is conventional 3 vessel arch anatomy.  The right brachiocephalic and visualized bilateral subclavian arteries are within normal limits. Right carotid: The right CCA arises as expected from the brachiocephalic trunk.  The CCA follows a normal course and appears normal caliber.  The carotid bifurcation is unremarkable.  The external carotid artery and distal branches appear within normal limits.  The cervical internal carotid artery follows a normal course and appears normal caliber throughout the neck and into the head.  The intracranial ICA segments appear within normal limits.  The ophthalmic artery origin is normal.  The A1 and M1 segments appear within normal limits.  The visualized distal MANN and MCA branches appear patent.  There is  a patent  anterior communicating artery. There is a patent  posterior communicating artery. Left carotid: The left CCA arises as expected from the aortic arch.   The CCA follows a normal course and appears normal caliber.  The carotid bifurcation is unremarkable.  The external carotid artery and distal  branches appear within normal limits.  The  cervical internal carotid artery follows a normal course and appears normal caliber throughout the neck and into the head. There is fusiform aneurysmal enlargement of the supraclinoid portion of the left internal carotid artery up to 8 mm, similar to the prior study. The ophthalmic artery origin is normal.  The A1 and M1 segments appear within normal limits. There is slight irregularity of the left anterior cerebral artery consistent with intracranial atherosclerosis. There is slight narrowing of the  proximal left M3 branch of the middle cerebral artery without occlusion. There is truncation of the distal left M4. There is a patent  posterior communicating artery. Posterior circulation: Vertebral arteries are patent skull base. Basilar artery is patent without focal stenosis or occlusion. The bilateral superior cerebellar arteries appear patent. There is fetal-type origin of the left posterior cerebral artery. This arises from the aneurysmal segment of the left internal carotid artery. There is irregularity of the distal branch of the left posterior cerebral artery compatible with intracranial atherosclerosis. There is occlusion of the P2 segment of the right posterior cerebral artery. No posterior circulation aneurysm identified. Nonvascular findings: Intracranial structures appear unremarkable.  Orbits are within normal limits.  Paranasal sinuses and mastoid air cells appear well aerated.  Superficial soft tissues and underlying musculature appear within normal limits.  The lung  apices are clear.  The thyroid and salivary glands appear unremarkable.  The suprahyoid and infrahyoid spaces of the neck appear unremarkable.  There is no evidence of cervical lymphadenopathy or a neck mass.  There are no acute osseous abnormalities or  destructive bone lesions.     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  2.Occlusion of the P2 segment of the right posterior cerebral artery. 3.Occlusion of the distal left M4 branch of the middle cerebral artery. 4.Intracranial atherosclerosis. Electronically Signed: Familia Karimi MD  7/21/2024 12:23 AM EDT  Workstation ID: [id number]    CT Angiogram Head w AI Analysis of LVO    Result Date: 7/21/2024  CT ANGIOGRAM HEAD W AI ANALYSIS OF LVO, CT ANGIOGRAM NECK Date of Exam: 7/20/2024 11:06 PM EDT Indication: confusion Acute Stroke. Comparison: CT angiogram of the head and neck from 7/1/2022 Technique: CTA of the head was performed after the uneventful intravenous administration of 115 mL Isovue-370. Reconstructed coronal and sagittal images were also obtained. In addition, a 3-D volume rendered image was created for interpretation. Automated exposure control and iterative reconstruction methods were used. Findings: Aortic arch: The aortic arch is unremarkable.  There is conventional 3 vessel arch anatomy.  The right brachiocephalic and visualized bilateral subclavian arteries are within normal limits. Right carotid: The right CCA arises as expected from the brachiocephalic trunk.  The CCA follows a normal course and appears normal caliber.  The carotid bifurcation is unremarkable.  The external carotid artery and distal branches appear within normal limits.  The cervical internal carotid artery follows a normal course and appears normal caliber throughout the neck and into the head.  The intracranial ICA segments appear within normal limits.  The ophthalmic artery origin is normal.  The A1 and M1 segments appear within normal limits.  The visualized distal MANN and MCA branches appear patent.  There is  a patent  anterior communicating artery. There is a patent  posterior communicating artery. Left carotid: The left CCA arises as expected from the aortic arch.   The CCA follows a normal course and appears normal caliber.  The carotid bifurcation is unremarkable.  The external carotid artery  and distal branches appear within normal limits.  The  cervical internal carotid artery follows a normal course and appears normal caliber throughout the neck and into the head. There is fusiform aneurysmal enlargement of the supraclinoid portion of the left internal carotid artery up to 8 mm, similar to the prior study. The ophthalmic artery origin is normal.  The A1 and M1 segments appear within normal limits. There is slight irregularity of the left anterior cerebral artery consistent with intracranial atherosclerosis. There is slight narrowing of the  proximal left M3 branch of the middle cerebral artery without occlusion. There is truncation of the distal left M4. There is a patent  posterior communicating artery. Posterior circulation: Vertebral arteries are patent skull base. Basilar artery is patent without focal stenosis or occlusion. The bilateral superior cerebellar arteries appear patent. There is fetal-type origin of the left posterior cerebral artery. This arises from the aneurysmal segment of the left internal carotid artery. There is irregularity of the distal branch of the left posterior cerebral artery compatible with intracranial atherosclerosis. There is occlusion of the P2 segment of the right posterior cerebral artery. No posterior circulation aneurysm identified. Nonvascular findings: Intracranial structures appear unremarkable.  Orbits are within normal limits.  Paranasal sinuses and mastoid air cells appear well aerated.  Superficial soft tissues and underlying musculature appear within normal limits.  The lung  apices are clear.  The thyroid and salivary glands appear unremarkable.  The suprahyoid and infrahyoid spaces of the neck appear unremarkable.  There is no evidence of cervical lymphadenopathy or a neck mass.  There are no acute osseous abnormalities or  destructive bone lesions.     Impression: Impression: 1.Stable 8 mm fusiform aneurysm of the supraclinoid left internal carotid  artery. 2.Occlusion of the P2 segment of the right posterior cerebral artery. 3.Occlusion of the distal left M4 branch of the middle cerebral artery. 4.Intracranial atherosclerosis. Electronically Signed: Familia Karimi MD  7/21/2024 12:23 AM EDT  Workstation ID: [id number]    CT CEREBRAL PERFUSION WITH & WITHOUT CONTRAST    Result Date: 7/21/2024  CT CEREBRAL PERFUSION W WO CONTRAST Date of Exam: 7/20/2024 11:06 PM EDT Indication: confusion.  Comparison: CT scan of the head dated 7/1/2022; MRI brain 7/2/2022 Technique: Axial CT images of the brain were obtained prior to and after the administration of 115 mL Isovue-370. Core blood volume, core blood flow, mean transit time, and Tmax images were obtained utilizing the Rapid software protocol. A limited CT angiogram of the head was also performed to measure the blood vessel density. The radiation dose reduction device was turned on for each scan per the ALARA (As Low as Reasonably Achievable) protocol. Findings: Cerebral blood flow, blood volume and mean transit time maps are symmetric without large perfusion defect. CBF<30% volume: 0 mL Tmax>6sec volume: 17 mL within the left parietal lobe in the distribution of the left middle cerebral artery. Mismatch volume: 17 mL Mismatch ratio: Infinite     Impression: Impression: 17 mL of ischemia in the left parietal lobe in the distribution of the left middle cerebral artery. Electronically Signed: Familia Karimi MD  7/21/2024 12:06 AM EDT  Workstation ID: [id number]    XR Chest 1 View    Result Date: 7/20/2024  XR CHEST 1 VW Date of Exam: 7/20/2024 11:03 PM EDT Indication: confusion Comparison: Chest radiograph dated 5/5/2024 Findings: The heart is enlarged. There is tortuosity of the thoracic aorta. The lungs are clear.     Impression: Impression: Cardiomegaly. No active disease. Electronically Signed: Familia Karimi MD  7/20/2024 11:50 PM EDT  Workstation ID: [id number]    CT Head Without Contrast Stroke Protocol    Result Date:  7/20/2024  CT HEAD WO CONTRAST STROKE PROTOCOL Date of Exam: 7/20/2024 11:05 PM EDT Indication: confusion. Comparison: None available. Technique: Axial CT images were obtained of the head without contrast administration.  Reconstructed coronal images were also obtained. Automated exposure control and iterative construction methods were used. Scan Time: 2307 Results discussed with Rep. from the stroke team at 11:16 p.m. Findings: There is mild diffuse generalized atrophy. There are low-attenuation areas in the periventricular white matter consistent with chronic microvascular ischemic change. There is no mass, mass effect or midline shift. There are no abnormal extra-axial fluid collections or areas of acute hemorrhage. The paranasal sinuses are clear. The mastoid air cells are clear.     Impression: Impression: Atrophy and chronic microvascular ischemic change. No acute intracranial process. Electronically Signed: Familia Karimi MD  7/20/2024 11:16 PM EDT  Workstation ID: [id number]     Results for orders placed during the hospital encounter of 07/20/24    Adult Transthoracic Echo Complete W/ Cont if Necessary Per Protocol (With Agitated Saline)    Interpretation Summary    Left ventricular wall thickness is consistent with mild concentric hypertrophy.    Estimated right ventricular systolic pressure from tricuspid regurgitation is normal (<35 mmHg).    Mild dilation of the ascending aorta is present.      Current medications:  Scheduled Meds:atorvastatin, 80 mg, Oral, Nightly  sodium chloride, 10 mL, Intravenous, Q12H      Continuous Infusions:heparin, 18 Units/kg/hr, Last Rate: 18 Units/kg/hr (07/22/24 1114)  Pharmacy to Dose Heparin,       PRN Meds:.  acetaminophen **OR** acetaminophen **OR** acetaminophen    aluminum-magnesium hydroxide-simethicone    senna-docusate sodium **AND** polyethylene glycol **AND** bisacodyl **AND** bisacodyl    Calcium Replacement - Follow Nurse / BPA Driven Protocol    Magnesium Standard  Dose Replacement - Follow Nurse / BPA Driven Protocol    ondansetron    Pharmacy to Dose Heparin    Phosphorus Replacement - Follow Nurse / BPA Driven Protocol    Potassium Replacement - Follow Nurse / BPA Driven Protocol    sodium chloride    sodium chloride    Assessment & Plan   Assessment & Plan     Active Hospital Problems    Diagnosis  POA    **CVA (cerebral vascular accident) [I63.9]  Yes    H/O cardioembolic CVA [Z86.73]  Not Applicable    Atrial fibrillation [I48.91]  Yes      Resolved Hospital Problems   No resolved problems to display.        Brief Hospital Course to date:  Yolie Jacob is a 89 y.o. female with past medical history significant for cardioembolic CVA, paroxysmal A-fib on Eliquis noncompliant, was admitted for an acute stroke after presenting with expressive aphasia.    Acute left MCA CVA with expressive aphasia and R hemineglect   Hx of cardioembolic CVA  Hx of right PCA CVA   PAF  Hx of noncompliance with Eliquis  - Stroke neurology following   - MRI confirms stroke   - Heparin drip started  - Patient candidate for Watchman procedure. Will attempt to work through Eliquis compliance barriers in interim   - Outpatient Neurosurgery f/u for ICA aneurysm   - Continue ASA and Lipitor   - PT/OT/SLP, will need IPR          Expected Discharge Location and Transportation: IPR  Expected Discharge   Expected Discharge Date: 7/23/2024; Expected Discharge Time:      VTE Prophylaxis:  Pharmacologic & mechanical VTE prophylaxis orders are present.         AM-PAC 6 Clicks Score (PT): 13 (07/22/24 0800)    CODE STATUS:   There are no questions and answers to display.       Bassam Guerrero, DO  07/22/24

## 2024-07-22 NOTE — PROGRESS NOTES
"Stroke Progress Note    Presenting Chief Complaint: Altered mental status    24-hour Events  Afebrile, blood pressure trend within goal, no acute events reported.    Subjective:  Patient is found lying in bed in no acute distress.  Patient answer simple orientation questions appropriately.  However, patient exhibits some generalized confusion about recent events.  Patient states she has residual right hand pain following a \"painful procedure\" this morning.  Patient does not remember what procedure was performed.  Bedside RN reports patient had an IV placed in her right hand this morning.  RN states IV placement and other circumstances regarding hospitalization have been explained to patient multiple times, but patient continues to repeat same questions about events.    Review of Systems   Constitutional:  Negative for chills and fatigue.   HENT:  Negative for trouble swallowing.    Eyes:  Negative for visual disturbance.   Respiratory:  Negative for shortness of breath.    Cardiovascular:  Negative for chest pain.   Gastrointestinal:  Negative for abdominal pain.   Musculoskeletal:  Negative for myalgias.        Patient complains of right hand pain s/p \"procedure\" this morning   Neurological:  Negative for tremors, facial asymmetry, speech difficulty, weakness, light-headedness, numbness and headaches.   Psychiatric/Behavioral:  Negative for behavioral problems. The patient is not nervous/anxious.       Objective  Neurological Exam  Mental Status  Alert. Oriented to person, place, time and situation. Speech is normal. Mixed aphasia present. Attention and concentration are normal. Fund of knowledge is abnormal.  Patient answer simple orientation questions appropriately.  She exhibits general confusion regarding recent events despite having these events explained to her multiple times..    Cranial Nerves  CN II: Right-sided visual field deficit.  CN III, IV, VI: Extraocular movements intact bilaterally. Pupils equal " round and reactive to light bilaterally.  CN V:  Right: Facial sensation is normal.  Left: Facial sensation is normal on the left.  CN VII:  Right: There is no facial weakness.  Left: There is no facial weakness.  CN VIII: Hearing grossly intact bilaterally.  CN IX, X: Palate elevates symmetrically  CN XII: Tongue midline without atrophy or fasciculations.    Motor  Normal muscle bulk throughout. Normal muscle tone.  All extremities at least 4/5 strength, no drift.    Sensory  Light touch is normal in upper and lower extremities.     Coordination  Right: Rapid alternating movement normal.Left: Rapid alternating movement normal.    Physical Exam  Constitutional:       General: She is not in acute distress.  HENT:      Head: Normocephalic and atraumatic.      Mouth/Throat:      Mouth: Mucous membranes are moist.   Eyes:      Extraocular Movements: Extraocular movements intact.      Pupils: Pupils are equal, round, and reactive to light.   Cardiovascular:      Rate and Rhythm: Normal rate and regular rhythm.   Pulmonary:      Effort: Pulmonary effort is normal.   Musculoskeletal:         General: No swelling or deformity.   Skin:     General: Skin is warm and dry.   Neurological:      Mental Status: She is alert.   Psychiatric:         Mood and Affect: Mood normal.         Speech: Speech normal.         Behavior: Behavior normal.     Temp:  [97 °F (36.1 °C)-98.3 °F (36.8 °C)] 97.2 °F (36.2 °C)  Heart Rate:  [77-83] 77  Resp:  [16-18] 18  BP: (131-154)/(71-93) 142/83    Results Review:    I reviewed the patient's new clinical results in the last 24 hours.    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 2.Occlusion of the P2 segment of the right posterior cerebral artery. 3.Occlusion of the distal left M4 branch of the middle cerebral artery. 4.Intracranial atherosclerosis. Electronically Signed: Familia  MD Trev  7/21/2024 12:23 AM EDT  Workstation ID: [id number]     CT CEREBRAL PERFUSION WITH & WITHOUT CONTRAST     Result Date: 7/21/2024  Impression: 17 mL of ischemia in the left parietal lobe in the distribution of the left middle cerebral artery. Electronically Signed: Familia Kairmi MD  7/21/2024 12:06 AM EDT  Workstation ID: [id number]     CT Head Without Contrast Stroke Protocol     Result Date: 7/20/2024  Impression: Atrophy and chronic microvascular ischemic change. No acute intracranial process. Electronically Signed: Familia Karimi MD  7/20/2024 11:16 PM EDT  Workstation ID: [id number]     MRI Brain Without Contrast    Result Date: 7/22/2024  MRI BRAIN WO CONTRAST Date of Exam: 7/22/2024 4:57 AM EDT Indication: Stroke, follow up stroke.  Comparison: CT scan the head dated 7/20/2024 Technique:  Routine multiplanar/multisequence sequence images of the brain were obtained without contrast administration. Findings: There is mild diffuse generalized atrophy. There are areas of increased T2 and FLAIR signal throughout the bilateral periventricular and subcortical white matter consistent with chronic microvascular ischemia. There is pathologic restricted diffusion in the posterior left parietal lobe in the distribution of the left middle cerebral artery. There is no evidence of an acute hemorrhage. There is no mass or mass effect. There are no abnormal extra-axial fluid collections.     Impression: Impression: Acute infarct in the distribution of the left middle cerebral artery. Electronically Signed: Familia Karimi MD  7/22/2024 6:29 AM EDT  Workstation ID: [id number]      Results for orders placed during the hospital encounter of 07/01/22   Adult Transthoracic Echo Complete W/ Cont if Necessary Per Protocol (With Agitated Saline)   Interpretation Summary  · Left ventricular wall thickness is consistent with mild concentric hypertrophy.  · Normal left ventricular systolic function, estimated EF 55%.  · Left ventricular  diastolic function is consistent with (grade I) impaired relaxation.  · Aortic sclerosis with mild aortic insufficiency, no evidence of aortic stenosis.  · Mild mitral vegetation.  · Trace to mild tricuspid regurgitation with normal RVSP.    Significant Labs  A1c on 7/21: 5.10  LDL on 7/21: 156    Assessment and Plan:  This patient is an 89-year-old female with multiple vascular risk factors, including atrial fibrillation (on Eliquis, questionable adherence) who presented to [facility] ED 7/20/2024 with concern for transient and recurrent neurologic symptoms to include altered mental status, left facial droop, speech difficulties, and disequilibrium.  Patient was not a candidate for IV thrombolytic therapy or emergent neurointervention.  Found to have left MCA territory stroke on MRI.    Acute stroke, left MCA territory  Left M3/M4 occlusion  Supraclinoid ICA aneurysm, stable  -Etiology likely cardioembolic in setting of noncompliance of her oral anticoagulation  -TTE no evidence of LAE, saline test not performed  -Continue heparin drip for now, will attempt to contact family today to discuss adherence/cost issues related to Eliquis  -Patient likely a candidate for watchman per cardiology  -Continue atorvastatin 80 mg nightly  -SBP goal 110-160  -Continue PT/OT/SLP as appropriate, agree with recommendations for inpatient rehab at discharge  -Follow-up in stroke clinic 1 month after discharge  -Recommend outpatient follow-up with neurosurgery for ICA aneurysm    Disposition: Remain on telemetry floor      Case discussed with the patient, bedside RN, and Dr. Kay.  Stroke neurology will continue to follow.       Los Mata PA-C  [facility] [place] Stroke Neurology

## 2024-07-22 NOTE — PROGRESS NOTES
HEPARIN INFUSION  Yolie Jacob is a  89 y.o. female receiving heparin infusion.     Therapy for (VTE/Cardiac): Cardiac  Patient Weight: 62.6 kg  Initial Bolus (Y/N):  N  Any Bolus (Y/N): No bolus ever        Signs or Symptoms of Bleeding: None    Cardiac or Other (Not VTE)   Initial Bolus: 60 units/kg (Max 4,000 units)  Initial rate: 12 units/kg/hr (Max 1,000 units/hr)   Anti Xa Rebolus Infusion Hold time Change infusion Dose (Units/kg/hr) Next Anti Xa or aPTT Level Due   < 0.11 None Increase by  3 Units/kg/hr 6 hours   0.11- 0.19 None Increase by  2 Units/kg/hr 6 hours   0.2 - 0.29 0 None Increase by  1 Units/kg/hr 6 hours   0.3 - 0.5 0 None No Change 6 hours (after 2 consecutive levels in range check qAM)   0.51 - 0.6 0 None Decrease by  1 Units/kg/hr 6 hours   0.61 - 0.8 0 30 Minutes Decrease by  2 Units/kg/hr 6 hours   0.81 - 1 0 60 Minutes Decrease by  3 Units/kg/hr 6 hours   >1 0 Hold  After Anti Xa less than 0.5 decrease previous rate by  4 Units/kg/hr  Every 2 hours until Anti Xa  less than 0.5 then when infusion restarts in 6 hours       Results from last 7 days   Lab Units 07/22/24  0336 07/21/24  1246 07/20/24  2326 07/20/24  2313 07/20/24  2312   INR   --  1.02  --   --  1.1   HEMOGLOBIN g/dL 13.9 14.0 15.1  --   --    HEMOGLOBIN, POC   --   --   --    < >  --    HEMATOCRIT % 41.5 41.1 46.5  --   --    HEMATOCRIT POC   --   --   --    < >  --    PLATELETS 10*3/mm3 195 206 235  --   --     < > = values in this interval not displayed.          Date   Time   Anti-Xa Current Rate (Unit/kg/hr) Bolus   (Units) Rate Change   (Unit/kg/hr) New Rate (Unit/kg/hr) Next   Anti-Xa Comments  Pump Check Daily   7/21 1246 0.10 -- -- +12 12 2000 D/w STEVEN Mejia. Will use anti-Xa levels given apparent noncompliance with Eliquis (aXa is baseline)   7/21 1937 0.10 12 -- +3 15 0400 Dw RN   7/22 0430 0.16 15 -- +2 17 1000 D/w RN   7/22 1023 0.22 17 -- +1 18 1800 D/w RN                                                                                                                                                                                                 Toshia Paz, Pharmacy Intern  7/22/2024  11:07 EDT

## 2024-07-23 LAB
ANION GAP SERPL CALCULATED.3IONS-SCNC: 12 MMOL/L (ref 5–15)
BUN SERPL-MCNC: 15 MG/DL (ref 8–23)
BUN/CREAT SERPL: 19.7 (ref 7–25)
CALCIUM SPEC-SCNC: 8.7 MG/DL (ref 8.6–10.5)
CHLORIDE SERPL-SCNC: 103 MMOL/L (ref 98–107)
CO2 SERPL-SCNC: 23 MMOL/L (ref 22–29)
CREAT SERPL-MCNC: 0.76 MG/DL (ref 0.57–1)
EGFRCR SERPLBLD CKD-EPI 2021: 75 ML/MIN/1.73
GLUCOSE SERPL-MCNC: 169 MG/DL (ref 65–99)
MAGNESIUM SERPL-MCNC: 2.1 MG/DL (ref 1.6–2.4)
POTASSIUM SERPL-SCNC: 3.1 MMOL/L (ref 3.5–5.2)
POTASSIUM SERPL-SCNC: 4.6 MMOL/L (ref 3.5–5.2)
SODIUM SERPL-SCNC: 138 MMOL/L (ref 136–145)
UFH PPP CHRO-ACNC: 0.46 IU/ML (ref 0.3–0.7)

## 2024-07-23 PROCEDURE — 92507 TX SP LANG VOICE COMM INDIV: CPT

## 2024-07-23 PROCEDURE — 80048 BASIC METABOLIC PNL TOTAL CA: CPT | Performed by: STUDENT IN AN ORGANIZED HEALTH CARE EDUCATION/TRAINING PROGRAM

## 2024-07-23 PROCEDURE — 99232 SBSQ HOSP IP/OBS MODERATE 35: CPT | Performed by: STUDENT IN AN ORGANIZED HEALTH CARE EDUCATION/TRAINING PROGRAM

## 2024-07-23 PROCEDURE — 97116 GAIT TRAINING THERAPY: CPT

## 2024-07-23 PROCEDURE — 97110 THERAPEUTIC EXERCISES: CPT

## 2024-07-23 PROCEDURE — 83735 ASSAY OF MAGNESIUM: CPT | Performed by: STUDENT IN AN ORGANIZED HEALTH CARE EDUCATION/TRAINING PROGRAM

## 2024-07-23 PROCEDURE — 85520 HEPARIN ASSAY: CPT

## 2024-07-23 PROCEDURE — 99232 SBSQ HOSP IP/OBS MODERATE 35: CPT

## 2024-07-23 PROCEDURE — 84132 ASSAY OF SERUM POTASSIUM: CPT | Performed by: STUDENT IN AN ORGANIZED HEALTH CARE EDUCATION/TRAINING PROGRAM

## 2024-07-23 RX ORDER — POTASSIUM CHLORIDE 20 MEQ/1
40 TABLET, EXTENDED RELEASE ORAL EVERY 4 HOURS
Qty: 6 TABLET | Refills: 0 | Status: COMPLETED | OUTPATIENT
Start: 2024-07-23 | End: 2024-07-23

## 2024-07-23 RX ADMIN — Medication 10 ML: at 09:48

## 2024-07-23 RX ADMIN — POTASSIUM CHLORIDE 40 MEQ: 1500 TABLET, EXTENDED RELEASE ORAL at 21:11

## 2024-07-23 RX ADMIN — Medication 10 ML: at 21:11

## 2024-07-23 RX ADMIN — APIXABAN 5 MG: 5 TABLET, FILM COATED ORAL at 09:47

## 2024-07-23 RX ADMIN — POTASSIUM CHLORIDE 40 MEQ: 1500 TABLET, EXTENDED RELEASE ORAL at 13:15

## 2024-07-23 RX ADMIN — ATORVASTATIN CALCIUM 80 MG: 40 TABLET, FILM COATED ORAL at 21:11

## 2024-07-23 RX ADMIN — SENNOSIDES AND DOCUSATE SODIUM 2 TABLET: 50; 8.6 TABLET ORAL at 05:56

## 2024-07-23 RX ADMIN — APIXABAN 5 MG: 5 TABLET, FILM COATED ORAL at 21:11

## 2024-07-23 RX ADMIN — POTASSIUM CHLORIDE 40 MEQ: 1500 TABLET, EXTENDED RELEASE ORAL at 18:06

## 2024-07-23 NOTE — THERAPY TREATMENT NOTE
Patient Name: Yolie Jacob  : 1935    MRN: [id number]                              Today's Date: 2024       Admit Date: 2024    Visit Dx:     ICD-10-CM ICD-9-CM   1. Acute confusion  R41.0 293.0   2. Slurred speech  R47.81 784.59   3. Acute stroke due to ischemia  I63.9 434.91     Patient Active Problem List   Diagnosis    Visual disturbance    Ataxia    Paresthesia and pain of left extremity    Headache    Atrial fibrillation    Cardioembolic stroke    CVA (cerebral vascular accident)    H/O cardioembolic CVA     Past Medical History:   Diagnosis Date    A-fib     Arthritis     H/O cardioembolic CVA 2024    Hypertension     Stroke      Past Surgical History:   Procedure Laterality Date    APPENDECTOMY      BREAST SURGERY      CATARACT EXTRACTION       SECTION      EYE SURGERY      HEMORRHOIDECTOMY      INTRAOCULAR LENS INSERTION      TONSILLECTOMY        General Information       Row Name 24 1301          Physical Therapy Time and Intention    Document Type therapy note (daily note) (P)   -CARLA     Mode of Treatment individual therapy;physical therapy (P)   -       Row Name 24 1301          General Information    Patient Profile Reviewed yes (P)   -CARLA     Existing Precautions/Restrictions fall;other (see comments) (P)   aphasia; confusion; decreased command following; delayed response; low vision; R neglect  -     Barriers to Rehab medically complex;cognitive status;visual deficit (P)   -       Row Name 24 1301          Cognition    Orientation Status (Cognition) oriented to;person;verbal cues/prompts needed for orientation;time;place (P)   -       Row Name 24 1301          Safety Issues, Functional Mobility    Safety Issues Affecting Function (Mobility) awareness of need for assistance;insight into deficits/self-awareness;problem-solving;safety precaution awareness;safety precautions follow-through/compliance;sequencing abilities (P)   -CARLA      Impairments Affecting Function (Mobility) balance;cognition;endurance/activity tolerance;strength;visual/perceptual (P)   -CARLA     Cognitive Impairments, Mobility Safety/Performance attention;awareness, need for assistance;insight into deficits/self-awareness;judgment;problem-solving/reasoning;safety precaution awareness;safety precaution follow-through;sequencing abilities (P)   -CARLA               User Key  (r) = Recorded By, (t) = Taken By, (c) = Cosigned By      Initials Name Provider Type    Rupa Kay, PT Student PT Student                   Mobility       Row Name 07/23/24 1302          Bed Mobility    Bed Mobility supine-sit;scooting/bridging (P)   -CARLA     Scooting/Bridging [place] (Bed Mobility) standby assist;verbal cues (P)   -CARLA     Supine-Sit [place] (Bed Mobility) standby assist;verbal cues (P)   -CARLA     Assistive Device (Bed Mobility) head of bed elevated;bed rails (P)   -CARLA     Comment, (Bed Mobility) Pt required verbal cues and a visual demonstration to sit EOB. Required increased time to perform task. (P)   -CARLA       Row Name 07/23/24 1302          Sit-Stand Transfer    Sit-Stand [place] (Transfers) contact guard;verbal cues (P)   -CARLA     Assistive Device (Sit-Stand Transfers) walker, front-wheeled (P)   -CARLA     Comment, (Sit-Stand Transfer) 1x STS from bed. Pt required verbal/tactile cues to push up from bed. Denied symptoms of dizziness upon standing. (P)   -CARLA       Row Name 07/23/24 1302          Gait/Stairs (Locomotion)    [place] Level (Gait) minimum assist (75% patient effort);1 person assist;verbal cues;nonverbal cues (demo/gesture) (P)   -CARLA     Assistive Device (Gait) walker, front-wheeled (P)   -CARLA     Patient was able to Ambulate yes (P)   -CARLA     Distance in Feet (Gait) 70 (P)   70+70  -CARLA     Deviations/Abnormal Patterns (Gait) base of support, narrow;yang decreased;stride length decreased (P)   -CARLA     Bilateral Gait Deviations forward flexed posture;heel  strike decreased (P)   -     [place] Level (Stairs) not tested (P)   -     Comment, (Gait/Stairs) Pt required assist with FWW management, room navigation and safety awareness during ambulation. Pt ran into multiple objects within the room and demonstrated R sided neglect while in the red. (P)   -               User Key  (r) = Recorded By, (t) = Taken By, (c) = Cosigned By      Initials Name Provider Type    Rupa Kay PT Student PT Student                   Obj/Interventions       [facility] Name 07/23/24 1307          Motor Skills    Therapeutic Exercise hip;knee;ankle (P)   -[facility] Name 07/23/24 130          Hip (Therapeutic Exercise)    Hip (Therapeutic Exercise) strengthening exercise (P)   -     Hip Strengthening (Therapeutic Exercise) bilateral;aBduction;aDduction;marching while seated;10 repetitions (P)   -CARLA       Row Name 07/23/24 1307          Knee (Therapeutic Exercise)    Knee (Therapeutic Exercise) strengthening exercise (P)   -     Knee Strengthening (Therapeutic Exercise) bilateral;LAQ (long arc quad);10 repetitions (P)   -CARLA       Row Name 07/23/24 [facility]          Ankle (Therapeutic Exercise)    Ankle (Therapeutic Exercise) AROM (active range of motion) (P)   -     Ankle AROM (Therapeutic Exercise) bilateral;dorsiflexion;plantarflexion;10 repetitions (P)   -CARLA       Row Name 07/23/24 130          Balance    Balance Assessment sitting static balance;sit to stand dynamic balance;standing static balance;standing dynamic balance (P)   -     Static Sitting Balance supervision (P)   -     Position, Sitting Balance unsupported;sitting in chair;sitting edge of bed (P)   -     Sit to Stand Dynamic Balance contact guard;1-person assist;verbal cues (P)   -     Static Standing Balance contact guard;1-person assist;verbal cues (P)   -     Dynamic Standing Balance minimal assist;1-person assist;verbal cues (P)   -     Position/Device Used, Standing Balance supported;walker,  front-wheeled (P)   -CARLA     Balance Interventions sitting;standing;sit to stand;supported;static;dynamic (P)   -CARLA     Comment, Balance Pt required frequent cues and assist for FWW management, room safety and object navigation. No LOB. (P)   -CARLA               User Key  (r) = Recorded By, (t) = Taken By, (c) = Cosigned By      Initials Name Provider Type    Rupa Kay, PT Student PT Student                   Goals/Plan    No documentation.                  Clinical Impression       Row Name 07/23/24 1309          Pain    Pretreatment Pain Rating 0/10 - no pain (P)   -CARLA     Posttreatment Pain Rating 0/10 - no pain (P)   -CARLA       Row Name 07/23/24 1309          Plan of Care Review    Plan of Care Reviewed With patient (P)   -CARLA     Progress improving (P)   -CARLA     Outcome Evaluation Pt required decreased assist with bed mobility today compared to last therapy session. Continues to require frequent assist for FWW management, safety awareness and room navigation due to R sided neglect. Continues to demonstrate BLE strength, balance, endurance and coordination below baseline requiring assist with functional mobility. PT recommended to address functional limitations and return to PLOF. (P)   -CARLA       Row Name 07/23/24 1309          Vital Signs    Post Systolic BP Rehab 133 (P)   -CARLA     Post Treatment Diastolic BP 88 (P)   -CARLA     O2 Delivery Pre Treatment room air (P)   -CARLA     O2 Delivery Intra Treatment room air (P)   -CARLA     O2 Delivery Post Treatment room air (P)   -CARLA     Pre Patient Position Supine (P)   -CARLA     Intra Patient Position Standing (P)   -CARLA     Post Patient Position Sitting (P)   -       Row Name 07/23/24 1308          Positioning and Restraints    Pre-Treatment Position in bed (P)   -CARLA     Post Treatment Position chair (P)   -CARLA     In Chair notified nsg;reclined;call light within reach;encouraged to call for assist;exit alarm on;waffle cushion;on mechanical lift sling;legs elevated (P)    -CARLA               User Key  (r) = Recorded By, (t) = Taken By, (c) = Cosigned By      Initials Name Provider Type    Rupa Kay, PT Student PT Student                   Outcome Measures       Row Name 07/23/24 1312 07/23/24 0754       How much help from another person do you currently need...    Turning from your back to your side while in flat bed without using bedrails? 4 (P)   -CARLA 4  -EM    Moving from lying on back to sitting on the side of a flat bed without bedrails? 3 (P)   -CARLA 3  -EM    Moving to and from a bed to a chair (including a wheelchair)? 3 (P)   -CARLA 3  -EM    Standing up from a chair using your arms (e.g., wheelchair, bedside chair)? 3 (P)   -CARLA 3  -EM    Climbing 3-5 steps with a railing? 3 (P)   -CARLA 2  -EM    To walk in hospital room? 3 (P)   -CARLA 3  -EM    AM-PAC 6 Clicks Score (PT) 19 (P)   -CARLA 18  -EM    Highest Level of Mobility Goal 6 --> Walk 10 steps or more (P)   -CARLA 6 --> Walk 10 steps or more  -EM      Row Name 07/23/24 1312          Functional Assessment    Outcome Measure Options AM-PAC 6 Clicks Basic Mobility (PT) (P)   -CARLA               User Key  (r) = Recorded By, (t) = Taken By, (c) = Cosigned By      Initials Name Provider Type    Bambi Erickson RN Registered Nurse    Rupa Kay, PT Student PT Student                                 Physical Therapy Education       Title: PT OT SLP Therapies (In Progress)       Topic: Physical Therapy (In Progress)       Point: Mobility training (In Progress)       Learning Progress Summary             Patient Acceptance, E,D, NR by CARLA at 7/23/2024 1312    Acceptance, E, NR by KG at 7/21/2024 1026                         Point: Home exercise program (Not Started)       Learner Progress:  Not documented in this visit.              Point: Body mechanics (In Progress)       Learning Progress Summary             Patient Acceptance, E,D, NR by CARLA at 7/23/2024 1312    Acceptance, E, NR by KG at 7/21/2024 1026                          Point: Precautions (In Progress)       Learning Progress Summary             Patient Acceptance, E,D, NR by CARLA at 7/23/2024 1312    Acceptance, E, NR by  at 7/21/2024 1026                                         User Key       Initials Effective Dates Name Provider Type Discipline    KG 05/22/20 -  Gwendolyn Barnes, PT Physical Therapist PT    CARLA 05/07/24 -  Rupa Christianson PT Student PT Student PT                  PT Recommendation and Plan     Plan of Care Reviewed With: (P) patient  Progress: (P) improving  Outcome Evaluation: (P) Pt required decreased assist with bed mobility today compared to last therapy session. Continues to require frequent assist for FWW management, safety awareness and room navigation due to R sided neglect. Continues to demonstrate BLE strength, balance, endurance and coordination below baseline requiring assist with functional mobility. PT recommended to address functional limitations and return to PLOF.     Time Calculation:         PT Charges       Row Name 07/23/24 1312             Time Calculation    Start Time 1136 (P)   -      PT Received On 07/23/24 (P)   -         Timed Charges    19916 - PT Therapeutic Exercise Minutes 8 (P)   -CARLA      85388 - Gait Training Minutes  19 (P)   -         Total Minutes    Timed Charges Total Minutes 27 (P)   -       Total Minutes 27 (P)   -                User Key  (r) = Recorded By, (t) = Taken By, (c) = Cosigned By      Initials Name Provider Type    Rupa Kay, PT Student PT Student                  Therapy Charges for Today       Code Description Service Date Service Provider Modifiers Qty    00517688060 HC PT THER PROC EA 15 MIN 7/23/2024 Rupa Christianson, PT Student GP 1    20692884505 HC GAIT TRAINING EA 15 MIN 7/23/2024 Rupa Christianson, PT Student GP 1            PT G-Codes  Outcome Measure Options: (P) AM-PAC 6 Clicks Basic Mobility (PT)  AM-PAC 6 Clicks Score (PT): (P) 19  AM-PAC 6 Clicks Score (OT): 16  Modified  [place] Scale: 3 - Moderate disability.  Requiring some help, but able to walk without assistance.       Rupa Christianson, PT Student  7/23/2024

## 2024-07-23 NOTE — PROGRESS NOTES
Stroke Progress Note    Presenting Chief Complaint: Altered mental status    24-hour Events  Afebrile, blood pressure trend within goal, no acute events reported.    Subjective:  Patient is found lying in bed in no acute distress.  Patient answer simple orientation questions appropriately.  Patient remains mildly confused about recent events today with some mixed aphasia.  But mental status is notably improved from yesterday.    Review of Systems   Constitutional:  Negative for chills and fatigue.   HENT:  Negative for trouble swallowing.    Eyes:  Negative for visual disturbance.   Respiratory:  Negative for shortness of breath.    Cardiovascular:  Negative for chest pain.   Gastrointestinal:  Negative for abdominal pain.   Musculoskeletal:  Negative for myalgias.   Neurological:  Negative for tremors, facial asymmetry, speech difficulty, weakness, light-headedness, numbness and headaches.   Psychiatric/Behavioral:  Negative for behavioral problems. The patient is not nervous/anxious.       Objective  Neurological Exam  Mental Status  Alert. Oriented to person, place, time and situation. Speech is normal. Mixed aphasia present. Attention and concentration are normal. Fund of knowledge is abnormal.  Patient answer simple orientation questions appropriately.  Patient remains mildly confused with mixed aphasia, however mental status is notably improved since yesterday..    Cranial Nerves  CN II: Right-sided visual field deficit.  CN III, IV, VI: Extraocular movements intact bilaterally. Pupils equal round and reactive to light bilaterally.  CN V:  Right: Facial sensation is normal.  Left: Facial sensation is normal on the left.  CN VII:  Right: There is no facial weakness.  Left: There is no facial weakness.  CN VIII: Hearing grossly intact bilaterally.  CN IX, X: Palate elevates symmetrically  CN XII: Tongue midline without atrophy or fasciculations.    Motor  Normal muscle bulk throughout. Normal muscle tone.  All  extremities at least 4/5 strength, no drift.    Sensory  Light touch is normal in upper and lower extremities.     Coordination  Right: Rapid alternating movement normal.Left: Rapid alternating movement normal.    Physical Exam  Constitutional:       General: She is not in acute distress.  HENT:      Head: Normocephalic and atraumatic.      Mouth/Throat:      Mouth: Mucous membranes are moist.   Eyes:      Extraocular Movements: Extraocular movements intact.      Pupils: Pupils are equal, round, and reactive to light.   Cardiovascular:      Rate and Rhythm: Normal rate and regular rhythm.   Pulmonary:      Effort: Pulmonary effort is normal.   Musculoskeletal:         General: No swelling or deformity.   Skin:     General: Skin is warm and dry.   Neurological:      Mental Status: She is alert.   Psychiatric:         Mood and Affect: Mood normal.         Speech: Speech normal.         Behavior: Behavior normal.     Temp:  [97.9 °F (36.6 °C)-98.9 °F (37.2 °C)] 98.9 °F (37.2 °C)  Heart Rate:  [] 109  Resp:  [18] 18  BP: (148-167)/() 150/87    Results Review:    I reviewed the patient's new clinical results in the last 24 hours.    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 2.Occlusion of the P2 segment of the right posterior cerebral artery. 3.Occlusion of the distal left M4 branch of the middle cerebral artery. 4.Intracranial atherosclerosis. Electronically Signed: Familia Karimi MD  7/21/2024 12:23 AM EDT  Workstation ID: [id number]     CT CEREBRAL PERFUSION WITH & WITHOUT CONTRAST     Result Date: 7/21/2024  Impression: 17 mL of ischemia in the left parietal lobe in the distribution of the left middle cerebral artery. Electronically Signed: Familia Karimi MD  7/21/2024 12:06 AM EDT  Workstation ID: [id number]     CT Head Without Contrast Stroke Protocol     Result Date:  7/20/2024  Impression: Atrophy and chronic microvascular ischemic change. No acute intracranial process. Electronically Signed: Familia Karimi MD  7/20/2024 11:16 PM EDT  Workstation ID: [id number]     MRI Brain Without Contrast    Result Date: 7/22/2024  MRI BRAIN WO CONTRAST Date of Exam: 7/22/2024 4:57 AM EDT Indication: Stroke, follow up stroke.  Comparison: CT scan the head dated 7/20/2024 Technique:  Routine multiplanar/multisequence sequence images of the brain were obtained without contrast administration. Findings: There is mild diffuse generalized atrophy. There are areas of increased T2 and FLAIR signal throughout the bilateral periventricular and subcortical white matter consistent with chronic microvascular ischemia. There is pathologic restricted diffusion in the posterior left parietal lobe in the distribution of the left middle cerebral artery. There is no evidence of an acute hemorrhage. There is no mass or mass effect. There are no abnormal extra-axial fluid collections.     Impression: Impression: Acute infarct in the distribution of the left middle cerebral artery. Electronically Signed: Familia Karimi MD  7/22/2024 6:29 AM EDT  Workstation ID: [id number]      Results for orders placed during the hospital encounter of 07/01/22   Adult Transthoracic Echo Complete W/ Cont if Necessary Per Protocol (With Agitated Saline)   Interpretation Summary  · Left ventricular wall thickness is consistent with mild concentric hypertrophy.  · Normal left ventricular systolic function, estimated EF 55%.  · Left ventricular diastolic function is consistent with (grade I) impaired relaxation.  · Aortic sclerosis with mild aortic insufficiency, no evidence of aortic stenosis.  · Mild mitral vegetation.  · Trace to mild tricuspid regurgitation with normal RVSP.    Significant Labs  A1c on 7/21: 5.10  LDL on 7/21: 156    Assessment and Plan:  This patient is an 89-year-old female with multiple vascular risk factors, including  atrial fibrillation (on Eliquis, questionable adherence) who presented to [facility] ED 7/20/2024 with concern for transient and recurrent neurologic symptoms to include altered mental status, left facial droop, speech difficulties, and disequilibrium.  Patient was not a candidate for IV thrombolytic therapy or emergent neurointervention.  Found to have left MCA territory stroke on MRI.    Acute stroke, left MCA territory  Left M3/M4 occlusion  Supraclinoid ICA aneurysm, stable  -Etiology likely cardioembolic in setting of nonadherence to her oral anticoagulation  -TTE no evidence of LAE, saline test not performed  -Plan to transition from heparin drip to home Eliquis dose today with pharmacy assistance, inpatient pharmacist notified  -Spoke with patient's son, Memo, via phone yesterday.  Discussed importance of adherence to home Eliquis and other medications.  Patient's son stated the patient lives with a family friend who typically assists her with medications.  Family friend is expected at bedside today.  -Plan to update family/caregiver if they arrive at bedside today.  -Patient likely a candidate for watchman per cardiology  -Continue atorvastatin 80 mg nightly  -SBP goal 110-160  -Continue PT/OT/SLP as appropriate, agree with recommendations for inpatient rehab at discharge  -Follow-up in stroke clinic 1 month after discharge  -Recommend outpatient follow-up with neurosurgery for ICA aneurysm    Disposition: Remain on telemetry floor.  Patient should be appropriate for discharge to inpatient rehab tomorrow if she remains stable after restarting Eliquis today.      Case discussed with the patient, bedside RN, and Dr. Kay.  Stroke neurology will continue to follow to discharge.       Los Mata PA-C  [facility]

## 2024-07-23 NOTE — PROGRESS NOTES
[facility] Medicine Services  PROGRESS NOTE    Patient Name: Yolie Jacob  : 1935  MRN: [id number]    Date of Admission: 2024  Primary Care Physician: Marin Castañeda DO    Subjective   Subjective     CC:  Abnormal speech       HPI:  Patient feels well this morning, reports improvement in her abnormal speech.  Denies any focal numbness or weakness in her extremities.  Exhibits intermittent expressive aphasia.      Objective   Objective     Vital Signs:   Temp:  [97.9 °F (36.6 °C)-98.9 °F (37.2 °C)] 98.9 °F (37.2 °C)  Heart Rate:  [] 109  Resp:  [18] 18  BP: (148-167)/() 150/87     Physical Exam:  General appearance: alert, awake, oriented, no acute distress   Cardiovascular: RRR, no murmurs or rubs, radial pulse full 2/4 BL   Respiratory: CTAB, no crackles or wheezes   Neuro/CNS: alert and oriented x3, expressive aphasia, right hemineglect    Results Reviewed:  LAB RESULTS:      Lab 24  0027 24  1807 24  1023 24  0336 24  1937 24  1246 24  1246 24  2326 24  2313 24  2312   WBC  --   --   --  5.33  --   --  5.16 5.37  --   --    HEMOGLOBIN  --   --   --  13.9  --   --  14.0 15.1  --   --    HEMOGLOBIN, POC  --   --   --   --   --   --   --   --  15.6  --    HEMATOCRIT  --   --   --  41.5  --   --  41.1 46.5  --   --    HEMATOCRIT POC  --   --   --   --   --   --   --   --  46  --    PLATELETS  --   --   --  195  --   --  206 235  --   --    NEUTROS ABS  --   --   --  3.84  --   --  4.01 3.97  --   --    IMMATURE GRANS (ABS)  --   --   --  0.03  --   --  0.01 0.06*  --   --    LYMPHS ABS  --   --   --  0.78  --   --  0.57* 0.72  --   --    MONOS ABS  --   --   --  0.56  --   --  0.48 0.48  --   --    EOS ABS  --   --   --  0.10  --   --  0.07 0.12  --   --    MCV  --   --   --  87.7  --   --  85.8 88.9  --   --    PROTIME  --   --   --   --   --   --  13.5  --   --  13.1   APTT  --   --   --   --   --    --  26.7*  --   --   --    HEPARIN ANTI-XA 0.46 0.42 0.22* 0.16* 0.10*   < > 0.10*  --   --   --     < > = values in this interval not displayed.         Lab 07/21/24  1234 07/20/24  2326 07/20/24  2313   SODIUM  --  138  --    POTASSIUM  --  3.6  --    CHLORIDE  --  104  --    CO2  --  22.0  --    ANION GAP  --  12.0  --    BUN  --  13  --    CREATININE  --  0.71 0.70   EGFR  --  81.4 82.8   GLUCOSE  --  131*  --    CALCIUM  --  8.9  --    HEMOGLOBIN A1C 5.10  --   --          Lab 07/20/24  2326   TOTAL PROTEIN 6.4   ALBUMIN 3.9   GLOBULIN 2.5   ALT (SGPT) 21   AST (SGOT) 26   BILIRUBIN 0.4   ALK PHOS 105         Lab 07/21/24  1246 07/20/24  2326 07/20/24  2312   HSTROP T  --  13  --    PROTIME 13.5  --  13.1   INR 1.02  --  1.1         Lab 07/21/24  1234   CHOLESTEROL 231*   LDL CHOL 156*   HDL CHOL 40   TRIGLYCERIDES 188*             Brief Urine Lab Results  (Last result in the past 365 days)        Color   Clarity   Blood   Leuk Est   Nitrite   Protein   CREAT   Urine HCG        07/21/24 0016 Yellow   Clear   Negative   Negative   Negative   Negative                   Microbiology Results Abnormal       Procedure Component Value - Date/Time    COVID PRE-OP / PRE-PROCEDURE SCREENING ORDER (NO ISOLATION) - Swab, Nasopharynx [[id number]]  (Normal) Collected: 07/20/24 2342    Lab Status: Final result Specimen: Swab from Nasopharynx Updated: 07/21/24 0149    Narrative:      The following orders were created for panel order COVID PRE-OP / PRE-PROCEDURE SCREENING ORDER (NO ISOLATION) - Swab, Nasopharynx.  Procedure                               Abnormality         Status                     ---------                               -----------         ------                     COVID-19 and FLU A/B PCR...[[id number]]  Normal              Final result                 Please view results for these tests on the individual orders.    COVID-19 and FLU A/B PCR, 1 HR TAT - Swab, Nasopharynx [[id number]]  (Normal) Collected:  07/20/24 2342    Lab Status: Final result Specimen: Swab from Nasopharynx Updated: 07/21/24 0149     COVID19 Not Detected     Influenza A PCR Not Detected     Influenza B PCR Not Detected    Narrative:      Fact sheet for providers: https://www.fda.gov/media/783848/download    Fact sheet for patients: https://www.fda.gov/media/766078/download    Test performed by PCR.            MRI Brain Without Contrast    Result Date: 7/22/2024  MRI BRAIN WO CONTRAST Date of Exam: 7/22/2024 4:57 AM EDT Indication: Stroke, follow up stroke.  Comparison: CT scan the head dated 7/20/2024 Technique:  Routine multiplanar/multisequence sequence images of the brain were obtained without contrast administration. Findings: There is mild diffuse generalized atrophy. There are areas of increased T2 and FLAIR signal throughout the bilateral periventricular and subcortical white matter consistent with chronic microvascular ischemia. There is pathologic restricted diffusion in the posterior left parietal lobe in the distribution of the left middle cerebral artery. There is no evidence of an acute hemorrhage. There is no mass or mass effect. There are no abnormal extra-axial fluid collections.     Impression: Impression: Acute infarct in the distribution of the left middle cerebral artery. Electronically Signed: Familia Karimi MD  7/22/2024 6:29 AM EDT  Workstation ID: [id number]    XR Abdomen KUB    Result Date: 7/21/2024  XR ABDOMEN KUB Date of Exam: 7/21/2024 9:37 PM EDT Indication: mri clearance Comparison: None available. Findings: No gross free air or pneumatosis though evaluation is slightly limited due to technique. There is a non obstructive bowel gas pattern. A normal stool burden is present.  No suspicious calcifications identified. No acute osseous abnormality identified. There are clips from cholecystectomy. There are no metallic foreign bodies to prevent MRI. Review of the patient's chest x-ray also demonstrates no evidence of  metallic foreign body to prevent MRI.     Impression: Impression: No acute abnormality. No metallic foreign body to prevent MRI. Electronically Signed: Familia Karimi MD  7/21/2024 10:48 PM EDT  Workstation ID: [id number]     Results for orders placed during the hospital encounter of 07/20/24    Adult Transthoracic Echo Complete W/ Cont if Necessary Per Protocol (With Agitated Saline)    Interpretation Summary    Left ventricular wall thickness is consistent with mild concentric hypertrophy.    Estimated right ventricular systolic pressure from tricuspid regurgitation is normal (<35 mmHg).    Mild dilation of the ascending aorta is present.      Current medications:  Scheduled Meds:apixaban, 5 mg, Oral, Q12H  atorvastatin, 80 mg, Oral, Nightly  sodium chloride, 10 mL, Intravenous, Q12H      Continuous Infusions:   PRN Meds:.  acetaminophen **OR** acetaminophen **OR** acetaminophen    aluminum-magnesium hydroxide-simethicone    senna-docusate sodium **AND** polyethylene glycol **AND** bisacodyl **AND** bisacodyl    Calcium Replacement - Follow Nurse / BPA Driven Protocol    Magnesium Standard Dose Replacement - Follow Nurse / BPA Driven Protocol    ondansetron    Phosphorus Replacement - Follow Nurse / BPA Driven Protocol    Potassium Replacement - Follow Nurse / BPA Driven Protocol    sodium chloride    sodium chloride    Assessment & Plan   Assessment & Plan     Active Hospital Problems    Diagnosis  POA    **CVA (cerebral vascular accident) [I63.9]  Yes    H/O cardioembolic CVA [Z86.73]  Not Applicable    Atrial fibrillation [I48.91]  Yes      Resolved Hospital Problems   No resolved problems to display.        Brief Hospital Course to date:  Yolie Jacob is a 89 y.o. female with past medical history significant for cardioembolic CVA, paroxysmal A-fib on Eliquis noncompliant, was admitted for an acute stroke after presenting with expressive aphasia.     Acute left MCA CVA with expressive aphasia and R hemineglect    Supraclinoid ICA aneurysm   Hx of cardioembolic CVA  Hx of right PCA CVA   PAF  Hx of noncompliance with Eliquis  - Stroke neurology following   - MRI confirms stroke   - DC heparin gtt and start Eliquis   - Patient candidate for Watchman procedure. Will attempt to work through Eliquis compliance barriers  - Outpatient Neurosurgery f/u for ICA aneurysm   - Continue ASA and Lipitor   - PT/OT/SLP, will need IPR   - Monitor for 24hrs after Eliquis initiation in setting of ICA aneurysm and if stable, DC to rehab tomorrow     Expected Discharge Location and Transportation: IPR  Expected Discharge   Expected Discharge Date: 7/24/2024; Expected Discharge Time:      VTE Prophylaxis:  Pharmacologic & mechanical VTE prophylaxis orders are present.         AM-PAC 6 Clicks Score (PT): 18 (07/22/24 1949)    CODE STATUS:   There are no questions and answers to display.       Bassam Guerrero, DO  07/23/24

## 2024-07-23 NOTE — PLAN OF CARE
Goal Outcome Evaluation:  Plan of Care Reviewed With: (P) patient        Progress: (P) improving  Outcome Evaluation: (P) Pt required decreased assist with bed mobility today compared to last therapy session. Continues to require frequent assist for FWW management, safety awareness and room navigation due to R sided neglect. Continues to demonstrate BLE strength, balance, endurance and coordination below baseline requiring assist with functional mobility. PT recommended to address functional limitations and return to PLOF.

## 2024-07-23 NOTE — PLAN OF CARE
Goal Outcome Evaluation:  Plan of Care Reviewed With: patient        Progress: improving  Outcome Evaluation: NSR/ST on tele. NIHSS=3 (unchanged). VSS. Discontinued heparin IV and started eliquis. Narda up to chair & ambulated w/ PT today. Plan discharge to inpatient rehab when medically ready.

## 2024-07-23 NOTE — THERAPY TREATMENT NOTE
Acute Care - Speech Language Pathology   Treatment Note  [facility]     Patient Name: Yolie Jacob  : 1935  MRN: [id number]  Today's Date: 2024               Admit Date: 2024     Visit Dx:    ICD-10-CM ICD-9-CM   1. Acute confusion  R41.0 293.0   2. Slurred speech  R47.81 784.59   3. Acute stroke due to ischemia  I63.9 434.91     Patient Active Problem List   Diagnosis    Visual disturbance    Ataxia    Paresthesia and pain of left extremity    Headache    Atrial fibrillation    Cardioembolic stroke    CVA (cerebral vascular accident)    H/O cardioembolic CVA     Past Medical History:   Diagnosis Date    A-fib     Arthritis     H/O cardioembolic CVA 2024    Hypertension     Stroke      Past Surgical History:   Procedure Laterality Date    APPENDECTOMY      BREAST SURGERY      CATARACT EXTRACTION       SECTION      EYE SURGERY      HEMORRHOIDECTOMY      INTRAOCULAR LENS INSERTION      TONSILLECTOMY         SLP Recommendation and Plan  SLP Diagnosis: mild-moderate, aphasia (24 153)              SLC Criteria for Skilled Therapy Interventions Met: yes (24)  Anticipated Discharge Disposition (SLP): inpatient rehabilitation facility (24 1530)        Therapy Frequency (SLP SLC): 5 days per week (24 153)  Predicted Duration Therapy Intervention (Days): 2 weeks (24)        Daily Summary of Progress (SLP): progress toward functional goals as expected (24 153)           Treatment Assessment (SLP): continued, mild-moderate, aphasia (24 153)     Plan for Continued Treatment (SLP): continue treatment per plan of care (24 153)         SLP EVALUATION (Last 72 Hours)       SLP SLC Evaluation       Row Name 24 1530 24 1030                Communication Assessment/Intervention    Document Type therapy note (daily note)  -CH evaluation  -CJ       Subjective Information no complaints  -CH no complaints  -CJ       Patient  Observations alert;cooperative;agree to therapy  - alert;cooperative  -       Patient Effort good  -CH good  -CJ       Symptoms Noted During/After Treatment none  - none  -          General Information    Patient Profile Reviewed yes  -CH yes  -CJ       Pertinent History Of Current Problem -- Pt adm on stroke pathway; sig h/o afib, CVA, ataxia.  -CJ       Precautions/Limitations, Vision -- WFL;for purposes of eval  -CJ       Precautions/Limitations, Hearing -- WFL;for purposes of eval  -CJ       Prior Level of Function-Communication -- WFL  -CJ       Plans/Goals Discussed with -- patient;agreed upon  -       Barriers to Rehab -- none identified  -       Patient's Goals for Discharge -- patient did not state  -          Pain    Additional Documentation Pain Scale: FACES Pre/Post-Treatment (Group)  - Pain Scale: FACES Pre/Post-Treatment (Group)  -          Pain Scale: FACES Pre/Post-Treatment    Pain: FACES Scale, Pretreatment 0-->no hurt  - 0-->no hurt  -       Posttreatment Pain Rating 0-->no hurt  -CH 0-->no hurt  -          Comprehension Assessment/Intervention    Comprehension Assessment/Intervention -- Auditory Comprehension;Reading Comprehension  -          Auditory Comprehension Assessment/Intervention    Auditory Comprehension (Communication) -- mild impairment  -       Able to Identify Objects/Pictures (Communication) -- WFL;familiar objects  -       Answers Questions (Communication) -- mild impairment;complex;yes/no  -CJ       Able to Follow Commands (Communication) -- mild impairment;2-step  -          Reading Comprehension Assessment/Intervention    Reading Comprehension (Communication) -- WFL  -          Expression Assessment/Intervention    Expression Assessment/Intervention -- verbal expression;graphic expression  -          Verbal Expression Assessment/Intervention    Verbal Expression -- mild impairment;moderate impairment  -       Automatic Speech  (Communication) -- WFL;months of year;days of week;counting 1-20  -CJ       Repetition -- mild impairment;sentences  -CJ       Phrase Completion -- mild impairment;unpredictable  -CJ       Responsive Naming -- mild impairment;complex  -CJ       Confrontational Naming -- WFL;low frequency  -CJ       Spontaneous/Functional Words -- moderate impairment;complex  -CJ       Sentence Formulation -- mild impairment;moderate impairment;complex  -CJ       Conversational Discourse/Fluency -- moderate impairment  -CJ          Graphic Expression Assessment/Intervention    Graphic Expression -- unable/difficult to assess  -CJ          Oral Motor Structure and Function    Oral Motor Structure and Function -- mild impairment  -CJ       Dentition Assessment -- natural, present and adequate  -CJ       Mucosal Quality -- moist, healthy  -          Oral Musculature and Cranial Nerve Assessment    Oral Motor General Assessment -- WFL  -          Motor Speech Assessment/Intervention    Motor Speech Function -- [facility]  -       Speech intelligibility -- 100%;in quiet environment;with unfamiliar listener;in connected speech  -          Cognitive Assessment Intervention- SLP    Cognitive Function (Cognition) -- unable/difficult to assess;other (see comments)  2/2 aphasia  -CJ          SLP Evaluation Clinical Impressions    SLP Diagnosis mild-moderate;aphasia  -CH mild-moderate;aphasia  -CJ       Rehab Potential/Prognosis good  -CH good  -CJ       SLC Criteria for Skilled Therapy Interventions Met yes  -CH yes  -CJ       Functional Impact functional impact in ADLs;difficulty communicating wants, needs;difficulty communicating in an emergency  -CH functional impact in ADLs;difficulty communicating wants, needs;difficulty communicating in an emergency  -CJ          SLP Treatment Clinical Impressions    Treatment Assessment (SLP) continued;mild-moderate;aphasia  -CH --       Daily Summary of Progress (SLP) progress toward functional goals  as expected  - --       Plan for Continued Treatment (SLP) continue treatment per plan of care  - --       Care Plan Review evaluation/treatment results reviewed;care plan/treatment goals reviewed  - --          Recommendations    Therapy Frequency (SLP SLC) 5 days per week  - 5 days per week  -       Predicted Duration Therapy Intervention (Days) 2 weeks  - 2 weeks  -       Anticipated Discharge Disposition (SLP) inpatient rehabilitation facility  - inpatient rehabilitation facility  -                 User Key  (r) = Recorded By, (t) = Taken By, (c) = Cosigned By      Initials Name Effective Dates     Lorna Mckeon, MS CCC-SLP 06/03/24 -      Katlyn Barrow, MS CCC-SLP 06/16/21 -                        EDUCATION  The patient has been educated in the following areas:     Communication Impairment.           [facility] GOALS       Row Name 07/23/24 1530 07/21/24 1030          Patient will demonstrate functional language skills for return to discharge environment     [place] with minimal cues  - with minimal cues  -     Time frame by discharge  - by discharge  -     Progress/Outcomes continuing progress toward goal  - new goal  -CJ        Comprehend Questions Goal 1 (SLP)    Improve Ability to Comprehend Questions Goal 1 (SLP) complex yes/no questions;simple wh questions;90%;with minimal cues (75-90%)  - complex yes/no questions;simple wh questions;90%;with minimal cues (75-90%)  -     Time Frame (Comprehend Questions Goal 1, SLP) 1 week  - 1 week  -CJ     Progress (Ability to Comprehend Questions Goal 1, SLP) 60%;with minimal cues (75-90%)  - --     Progress/Outcomes (Comprehend Questions Goal 1, SLP) continuing progress toward goal  - new goal  -     Comment (Comprehend Questions Goal 1, SLP) WH questions  - --        Follow Directions Goal 2 (SLP)    Improve Ability to Follow Directions Goal 1 (SLP) 2 step commands;90%;with minimal cues (75-90%)  - 2 step  commands;90%;with minimal cues (75-90%)  -CJ     Time Frame (Follow Directions Goal 1, SLP) 1 week  -CH 1 week  -CJ     Progress (Ability to Follow Directions Goal 1, SLP) 60%;with minimal cues (75-90%)  - --     Progress/Outcomes (Follow Directions Goal 1, SLP) continuing progress toward goal  -CH new goal  -CJ        Word Retrieval Skills Goal 1 (SLP)    Improve Word Retrieval Skills By Goal 1 (SLP) responsive naming task;complex;completing open ended unstructured sentence;completing functional word finding tasks;90%;with minimal cues (75-90%)  -CH responsive naming task;complex;completing open ended unstructured sentence;completing functional word finding tasks;90%;with minimal cues (75-90%)  -CJ     Time Frame (Word Retrieval Goal 1, SLP) 1 week  -CH 1 week  -CJ     Progress (Word Retrieval Skills Goal 1, SLP) 60%;with minimal cues (75-90%)  - --     Progress/Outcomes (Word Retrieval Goal 1, SLP) continuing progress toward goal  - new goal  -CJ     Comment (Word Retrieval Goal 1, SLP) responsive naming and fxl word finding tasks  - --               User Key  (r) = Recorded By, (t) = Taken By, (c) = Cosigned By      Initials Name Provider Type    Lorna More MS CCC-SLP Speech and Language Pathologist    Katlyn Chaney MS CCC-SLP Speech and Language Pathologist                              Time Calculation:      Time Calculation- SLP       Row Name 07/23/24 1559             Time Calculation- SLP    SLP Start Time 1530  -CH      SLP Received On 07/23/24  -         Untimed Charges    08241-LP Treatment/ST Modification Prosth Aug Alter  42  -CH         Total Minutes    Untimed Charges Total Minutes 42  -CH       Total Minutes 42  -CH                User Key  (r) = Recorded By, (t) = Taken By, (c) = Cosigned By      Initials Name Provider Type    Katlyn Chaney MS CCC-SLP Speech and Language Pathologist                    Therapy Charges for Today       Code Description Service Date  Service Provider Modifiers Qty    [id number] [facility] TREATMENT SPEECH 3 7/23/2024 Katlyn Barrow, MS CCC-SLP GN 1                       Katlyn Barrow MS CCC-SLP  7/23/2024

## 2024-07-23 NOTE — PLAN OF CARE
Goal Outcome Evaluation:                     Anticipated Discharge Disposition (SLP): inpatient rehabilitation facility    SLP Diagnosis: mild-moderate, aphasia (07/23/24 1530)        Treatment Assessment (SLP): continued, mild-moderate, aphasia (07/23/24 1530)     Plan for Continued Treatment (SLP): continue treatment per plan of care (07/23/24 1530)

## 2024-07-24 ENCOUNTER — TELEPHONE (OUTPATIENT)
Dept: NEUROLOGY | Facility: CLINIC | Age: 89
End: 2024-07-24
Payer: MEDICARE

## 2024-07-24 VITALS
RESPIRATION RATE: 18 BRPM | HEIGHT: 56 IN | HEART RATE: 78 BPM | DIASTOLIC BLOOD PRESSURE: 102 MMHG | OXYGEN SATURATION: 92 % | WEIGHT: 138.01 LBS | BODY MASS INDEX: 31.05 KG/M2 | TEMPERATURE: 97.7 F | SYSTOLIC BLOOD PRESSURE: 169 MMHG

## 2024-07-24 PROCEDURE — 99239 HOSP IP/OBS DSCHRG MGMT >30: CPT | Performed by: PHYSICIAN ASSISTANT

## 2024-07-24 PROCEDURE — 92507 TX SP LANG VOICE COMM INDIV: CPT

## 2024-07-24 PROCEDURE — 99232 SBSQ HOSP IP/OBS MODERATE 35: CPT

## 2024-07-24 RX ADMIN — APIXABAN 5 MG: 5 TABLET, FILM COATED ORAL at 09:06

## 2024-07-24 NOTE — THERAPY TREATMENT NOTE
Acute Care - Speech Language Pathology   Treatment Note  [facility]     Patient Name: Yolie Jacob  : 1935  MRN: [id number]  Today's Date: 2024               Admit Date: 2024     Visit Dx:    ICD-10-CM ICD-9-CM   1. Acute confusion  R41.0 293.0   2. Slurred speech  R47.81 784.59   3. Acute stroke due to ischemia  I63.9 434.91   4. Cerebrovascular accident (CVA), unspecified mechanism  I63.9 434.91   5. Aphasia  R47.01 784.3     Patient Active Problem List   Diagnosis    Visual disturbance    Ataxia    Paresthesia and pain of left extremity    Headache    Atrial fibrillation    Cardioembolic stroke    CVA (cerebral vascular accident)    H/O cardioembolic CVA     Past Medical History:   Diagnosis Date    A-fib     Arthritis     H/O cardioembolic CVA 2024    Hypertension     Stroke      Past Surgical History:   Procedure Laterality Date    APPENDECTOMY      BREAST SURGERY      CATARACT EXTRACTION       SECTION      EYE SURGERY      HEMORRHOIDECTOMY      INTRAOCULAR LENS INSERTION      TONSILLECTOMY         SLP Recommendation and Plan  SLP Diagnosis: mild-moderate, aphasia (24 0950)              [facility] Criteria for Skilled Therapy Interventions Met: yes (24 0950)  Anticipated Discharge Disposition (SLP): inpatient rehabilitation facility (2450)        Therapy Frequency (SLP SLC): 5 days per week (24 0950)  Predicted Duration Therapy Intervention (Days): 2 weeks (2450)        Daily Summary of Progress (SLP): progress toward functional goals as expected (24 0950)           Treatment Assessment (SLP): continued, mild-moderate, aphasia (24 0950)     Plan for Continued Treatment (SLP): continue treatment per plan of care (24 0950)         SLP EVALUATION (Last 72 Hours)       SLP SLC Evaluation       Row Name 24 0950 24 3640                Communication Assessment/Intervention    Document Type therapy note (daily note)  -  therapy note (daily note)  -CH       Subjective Information no complaints  -CH no complaints  -CH       Patient Observations alert;cooperative;agree to therapy  -CH alert;cooperative;agree to therapy  -CH       Patient Effort good  -CH good  -CH       Symptoms Noted During/After Treatment none  -CH none  -CH          General Information    Patient Profile Reviewed yes  -CH yes  -CH          Pain    Additional Documentation Pain Scale: FACES Pre/Post-Treatment (Group)  -CH Pain Scale: FACES Pre/Post-Treatment (Group)  -CH          Pain Scale: FACES Pre/Post-Treatment    Pain: FACES Scale, Pretreatment 0-->no hurt  -CH 0-->no hurt  -CH       Posttreatment Pain Rating 0-->no hurt  -CH 0-->no hurt  -CH          SLP Evaluation Clinical Impressions    SLP Diagnosis mild-moderate;aphasia  -CH mild-moderate;aphasia  -CH       Rehab Potential/Prognosis good  -CH good  -CH       SLC Criteria for Skilled Therapy Interventions Met yes  -CH yes  -CH       Functional Impact functional impact in ADLs;difficulty communicating wants, needs;difficulty communicating in an emergency  -CH functional impact in ADLs;difficulty communicating wants, needs;difficulty communicating in an emergency  -CH          SLP Treatment Clinical Impressions    Treatment Assessment (SLP) continued;mild-moderate;aphasia  -CH continued;mild-moderate;aphasia  -CH       Daily Summary of Progress (SLP) progress toward functional goals as expected  -CH progress toward functional goals as expected  -       Plan for Continued Treatment (SLP) continue treatment per plan of care  -CH continue treatment per plan of care  -CH       Care Plan Review evaluation/treatment results reviewed;care plan/treatment goals reviewed  - evaluation/treatment results reviewed;care plan/treatment goals reviewed  -          Recommendations    Therapy Frequency (SLP SLC) 5 days per week  -CH 5 days per week  -       Predicted Duration Therapy Intervention (Days) 2 weeks  - 2  weeks  -CH       Anticipated Discharge Disposition (SLP) inpatient rehabilitation facility  - inpatient rehabilitation facility  -                 User Key  (r) = Recorded By, (t) = Taken By, (c) = Cosigned By      Initials Name Effective Dates    CH Katlyn Barrow MS CCC-SLP 06/16/21 -                        EDUCATION  The patient has been educated in the following areas:     Cognitive Impairment Communication Impairment.           SLP GOALS       Row Name 07/24/24 0950 07/23/24 2840          Patient will demonstrate functional language skills for return to discharge environment     [place] with minimal cues  -CH with minimal cues  -CH     Time frame by discharge  -CH by discharge  -CH     Progress/Outcomes continuing progress toward goal  -CH continuing progress toward goal  -CH        Comprehend Questions Goal 1 (SLP)    Improve Ability to Comprehend Questions Goal 1 (SLP) complex yes/no questions;simple wh questions;90%;with minimal cues (75-90%)  -CH complex yes/no questions;simple wh questions;90%;with minimal cues (75-90%)  -CH     Time Frame (Comprehend Questions Goal 1, SLP) 1 week  -CH 1 week  -CH     Progress (Ability to Comprehend Questions Goal 1, SLP) 60%;with minimal cues (75-90%)  -CH 60%;with minimal cues (75-90%)  -CH     Progress/Outcomes (Comprehend Questions Goal 1, SLP) continuing progress toward goal  -CH continuing progress toward goal  -CH     Comment (Comprehend Questions Goal 1, SLP) WH questions  - WH questions  -        Follow Directions Goal 2 (SLP)    Improve Ability to Follow Directions Goal 1 (SLP) 2 step commands;90%;with minimal cues (75-90%)  -CH 2 step commands;90%;with minimal cues (75-90%)  -     Time Frame (Follow Directions Goal 1, SLP) 1 week  -CH 1 week  -CH     Progress (Ability to Follow Directions Goal 1, SLP) 60%;with minimal cues (75-90%)  -CH 60%;with minimal cues (75-90%)  -     Progress/Outcomes (Follow Directions Goal 1, SLP) continuing  progress toward goal  -CH continuing progress toward goal  -CH        Word Retrieval Skills Goal 1 (SLP)    Improve Word Retrieval Skills By Goal 1 (SLP) responsive naming task;complex;completing open ended unstructured sentence;completing functional word finding tasks;90%;with minimal cues (75-90%)  -CH responsive naming task;complex;completing open ended unstructured sentence;completing functional word finding tasks;90%;with minimal cues (75-90%)  -CH     Time Frame (Word Retrieval Goal 1, SLP) 1 week  -CH 1 week  -CH     Progress (Word Retrieval Skills Goal 1, SLP) 60%;with minimal cues (75-90%)  -CH 60%;with minimal cues (75-90%)  -CH     Progress/Outcomes (Word Retrieval Goal 1, SLP) continuing progress toward goal  -CH continuing progress toward goal  -CH     Comment (Word Retrieval Goal 1, SLP) responsive naming and fxl word finding tasks  -CH responsive naming and fxl word finding tasks  -CH               User Key  (r) = Recorded By, (t) = Taken By, (c) = Cosigned By      Initials Name Provider Type    Katlyn Chaney MS CCC-SLP Speech and Language Pathologist                              Time Calculation:      Time Calculation- SLP       Row Name 07/24/24 1624             Time Calculation- SLP    SLP Start Time 0950  -CH      SLP Received On 07/24/24  -         Untimed Charges    08498-RF Treatment/ST Modification Prosth Aug Alter  45  -CH         Total Minutes    Untimed Charges Total Minutes 45  -CH       Total Minutes 45  -CH                User Key  (r) = Recorded By, (t) = Taken By, (c) = Cosigned By      Initials Name Provider Type    Katlyn Chaney MS CCC-SLP Speech and Language Pathologist                    Therapy Charges for Today       Code Description Service Date Service Provider Modifiers Qty    56266943903 [facility] TREATMENT SPEECH 3 7/23/2024 Katlyn Barrow MS CCC-SLP GN 1    [id number] HC ST TREATMENT SPEECH 3 7/24/2024 Katlyn Barrow MS CCC-ROBERT GN 1                        Katlyn Barrow, MS CCC-SLP  7/24/2024

## 2024-07-24 NOTE — DISCHARGE SUMMARY
[facility] Medicine Services  DISCHARGE SUMMARY    Patient Name: Yolie Jacob  : 1935  MRN: [id number]    Date of Admission: 2024 10:58 PM  Date of Discharge:  2024  Primary Care Physician: Marin Castañeda DO    Hospital Course     Active Hospital Problems    Diagnosis  POA    **CVA (cerebral vascular accident) [I63.9]  Yes    H/O cardioembolic CVA [Z86.73]  Not Applicable    Atrial fibrillation [I48.91]  Yes      Resolved Hospital Problems   No resolved problems to display.      Hospital Course:  Ms. Yolie Jacob is an 89yoF with PMH significant for prior cardioembolic CVA and paroxysmal atrial fibrillation (noncompliant with Eliquis). She was brought to [facility] ED on 24 for evaluation of L facial droop and expressive aphasia. Last known well was on . Around 0100 on , she was noted to have L facial droop and some slurred speech. Around 1500 on , facial droop had resolved but she was noted to have ongoing speech difficulties as well as confusion and inability to follow directions. She was found to have an acute L MCA CVA.     Acute left MCA CVA with expressive aphasia and R hemineglect   Supraclinoid ICA aneurysm   History of cardioembolic CVA  History of R PCA CVA   Paroxysmal atrial fibrillation  Noncompliance with Eliquis  - Stroke neurology team has followed   - MRI confirmed L MCA CVA stroke   - s/p Heparin gtt - now back on Eliquis. Compliance is paramount. Per cardiology, she is likely a candidate for Watchman device placement if can overcome Eliquis compliance barriers. Patient's son reports that the patient lives with a family friend who typically assists her with medications but this person has not been present for stroke team to discuss Eliquis compliance with them. Did speak with patient's son who can hopefully communicate this with family friend   - Will need outpatient follow up with neurosurgery for ICA  aneurysm  - Continue ASA and high-intensity statin  - Goal -160  - PT/OT/SLP following - rehab planned  - Follow up with stroke clinic in 1 month     Day of Discharge     HPI:   Sitting up in bed. Says she is having trouble reading. Notes a history of macular degeneration but feels vision has gotten worse. Cannot tell me the chronicity of the worsening. Denies chest pain, dyspnea, abdominal pain, nausea or vomiting. To [facility] today     Review of Systems  Gen- No fevers, chills  CV- No chest pain, palpitations  Resp- No cough, dyspnea  GI- No N/V/D, abd pain    Vital Signs:   Temp:  [97.7 °F (36.5 °C)-98.4 °F (36.9 °C)] 97.7 °F (36.5 °C)  Heart Rate:  [77-88] 79  Resp:  [17-18] 18  BP: (126-160)/(50-97) 147/91    Physical Exam:  Constitutional: No acute distress, awake, alert and conversant. Sitting in bed   HENT: NCAT, mucous membranes moist  Respiratory: Clear to auscultation bilaterally, respiratory effort normal   Cardiovascular: RRR  Gastrointestinal: Positive bowel sounds, soft, nontender, nondistended  Musculoskeletal: No bilateral ankle edema  Psychiatric: Appropriate affect, cooperative with exam  Neurologic: Oriented x 3, right-sided weakness, difficulty following commands with abnormal finger to nose test, speech is clear expressive aphasia noted  Skin: No rashes to exposed surfaces     Pertinent  and/or Most Recent Results     LAB RESULTS:      Lab 07/23/24  0027 07/22/24  1807 07/22/24  1023 07/22/24  0336 07/21/24  1937 07/21/24  1246 07/21/24  1246 07/20/24  2326 07/20/24  2313 07/20/24  2312   WBC  --   --   --  5.33  --   --  5.16 5.37  --   --    HEMOGLOBIN  --   --   --  13.9  --   --  14.0 15.1  --   --    HEMOGLOBIN, POC  --   --   --   --   --   --   --   --  15.6  --    HEMATOCRIT  --   --   --  41.5  --   --  41.1 46.5  --   --    HEMATOCRIT POC  --   --   --   --   --   --   --   --  46  --    PLATELETS  --   --   --  195  --   --  206 235  --   --    NEUTROS ABS  --   --   --  3.84  --    --  4.01 3.97  --   --    IMMATURE GRANS (ABS)  --   --   --  0.03  --   --  0.01 0.06*  --   --    LYMPHS ABS  --   --   --  0.78  --   --  0.57* 0.72  --   --    MONOS ABS  --   --   --  0.56  --   --  0.48 0.48  --   --    EOS ABS  --   --   --  0.10  --   --  0.07 0.12  --   --    MCV  --   --   --  87.7  --   --  85.8 88.9  --   --    PROTIME  --   --   --   --   --   --  13.5  --   --  13.1   APTT  --   --   --   --   --   --  26.7*  --   --   --    HEPARIN ANTI-XA 0.46 0.42 0.22* 0.16* 0.10*   < > 0.10*  --   --   --     < > = values in this interval not displayed.         Lab 07/23/24  2217 07/23/24  1052 07/21/24  1234 07/20/24 2326 07/20/24  2313   SODIUM  --  138  --  138  --    POTASSIUM 4.6 3.1*  --  3.6  --    CHLORIDE  --  103  --  104  --    CO2  --  23.0  --  22.0  --    ANION GAP  --  12.0  --  12.0  --    BUN  --  15  --  13  --    CREATININE  --  0.76  --  0.71 0.70   EGFR  --  75.0  --  81.4 82.8   GLUCOSE  --  169*  --  131*  --    CALCIUM  --  8.7  --  8.9  --    MAGNESIUM  --  2.1  --   --   --    HEMOGLOBIN A1C  --   --  5.10  --   --          Lab 07/20/24 2326   TOTAL PROTEIN 6.4   ALBUMIN 3.9   GLOBULIN 2.5   ALT (SGPT) 21   AST (SGOT) 26   BILIRUBIN 0.4   ALK PHOS 105         Lab 07/21/24  1246 07/20/24  2326 07/20/24  2312   HSTROP T  --  13  --    PROTIME 13.5  --  13.1   INR 1.02  --  1.1         Lab 07/21/24  1234   CHOLESTEROL 231*   LDL CHOL 156*   HDL CHOL 40   TRIGLYCERIDES 188*             Brief Urine Lab Results  (Last result in the past 365 days)        Color   Clarity   Blood   Leuk Est   Nitrite   Protein   CREAT   Urine HCG        07/21/24 0016 Yellow   Clear   Negative   Negative   Negative   Negative                 Microbiology Results (last 10 days)       Procedure Component Value - Date/Time    COVID PRE-OP / PRE-PROCEDURE SCREENING ORDER (NO ISOLATION) - Swab, Nasopharynx [[id number]]  (Normal) Collected: 07/20/24 0423    Lab Status: Final result Specimen: Swab from  Nasopharynx Updated: 07/21/24 0149    Narrative:      The following orders were created for panel order COVID PRE-OP / PRE-PROCEDURE SCREENING ORDER (NO ISOLATION) - Swab, Nasopharynx.  Procedure                               Abnormality         Status                     ---------                               -----------         ------                     COVID-19 and FLU A/B PCR...[[id number]]  Normal              Final result                 Please view results for these tests on the individual orders.    COVID-19 and FLU A/B PCR, 1 HR TAT - Swab, Nasopharynx [[id number]]  (Normal) Collected: 07/20/24 2342    Lab Status: Final result Specimen: Swab from Nasopharynx Updated: 07/21/24 0149     COVID19 Not Detected     Influenza A PCR Not Detected     Influenza B PCR Not Detected    Narrative:      Fact sheet for providers: https://www.fda.gov/media/672645/download    Fact sheet for patients: https://www.fda.gov/media/120371/download    Test performed by PCR.            MRI Brain Without Contrast    Result Date: 7/22/2024  MRI BRAIN WO CONTRAST Date of Exam: 7/22/2024 4:57 AM EDT Indication: Stroke, follow up stroke.  Comparison: CT scan the head dated 7/20/2024 Technique:  Routine multiplanar/multisequence sequence images of the brain were obtained without contrast administration. Findings: There is mild diffuse generalized atrophy. There are areas of increased T2 and FLAIR signal throughout the bilateral periventricular and subcortical white matter consistent with chronic microvascular ischemia. There is pathologic restricted diffusion in the posterior left parietal lobe in the distribution of the left middle cerebral artery. There is no evidence of an acute hemorrhage. There is no mass or mass effect. There are no abnormal extra-axial fluid collections.     Impression: Acute infarct in the distribution of the left middle cerebral artery. Electronically Signed: Familia Karimi MD  7/22/2024 6:29 AM EDT  Workstation  ID: [id number]    XR Abdomen KUB    Result Date: 7/21/2024  XR ABDOMEN KUB Date of Exam: 7/21/2024 9:37 PM EDT Indication: mri clearance Comparison: None available. Findings: No gross free air or pneumatosis though evaluation is slightly limited due to technique. There is a non obstructive bowel gas pattern. A normal stool burden is present.  No suspicious calcifications identified. No acute osseous abnormality identified. There are clips from cholecystectomy. There are no metallic foreign bodies to prevent MRI. Review of the patient's chest x-ray also demonstrates no evidence of metallic foreign body to prevent MRI.     Impression: No acute abnormality. No metallic foreign body to prevent MRI. Electronically Signed: Familia Karimi MD  7/21/2024 10:48 PM EDT  Workstation ID: [id number]    Adult Transthoracic Echo Complete W/ Cont if Necessary Per Protocol (With Agitated Saline)    Result Date: 7/21/2024    Left ventricular wall thickness is consistent with mild concentric hypertrophy.   Estimated right ventricular systolic pressure from tricuspid regurgitation is normal (<35 mmHg).   Mild dilation of the ascending aorta is present.     CT Angiogram Neck    Result Date: 7/21/2024  CT ANGIOGRAM HEAD W AI ANALYSIS OF LVO, CT ANGIOGRAM NECK Date of Exam: 7/20/2024 11:06 PM EDT Indication: confusion Acute Stroke. Comparison: CT angiogram of the head and neck from 7/1/2022 Technique: CTA of the head was performed after the uneventful intravenous administration of 115 mL Isovue-370. Reconstructed coronal and sagittal images were also obtained. In addition, a 3-D volume rendered image was created for interpretation. Automated exposure control and iterative reconstruction methods were used. Findings: Aortic arch: The aortic arch is unremarkable.  There is conventional 3 vessel arch anatomy.  The right brachiocephalic and visualized bilateral subclavian arteries are within normal limits. Right carotid: The right CCA arises as  expected from the brachiocephalic trunk.  The CCA follows a normal course and appears normal caliber.  The carotid bifurcation is unremarkable.  The external carotid artery and distal branches appear within normal limits.  The cervical internal carotid artery follows a normal course and appears normal caliber throughout the neck and into the head.  The intracranial ICA segments appear within normal limits.  The ophthalmic artery origin is normal.  The A1 and M1 segments appear within normal limits.  The visualized distal MANN and MCA branches appear patent.  There is  a patent  anterior communicating artery. There is a patent  posterior communicating artery. Left carotid: The left CCA arises as expected from the aortic arch.   The CCA follows a normal course and appears normal caliber.  The carotid bifurcation is unremarkable.  The external carotid artery and distal branches appear within normal limits.  The  cervical internal carotid artery follows a normal course and appears normal caliber throughout the neck and into the head. There is fusiform aneurysmal enlargement of the supraclinoid portion of the left internal carotid artery up to 8 mm, similar to the prior study. The ophthalmic artery origin is normal.  The A1 and M1 segments appear within normal limits. There is slight irregularity of the left anterior cerebral artery consistent with intracranial atherosclerosis. There is slight narrowing of the  proximal left M3 branch of the middle cerebral artery without occlusion. There is truncation of the distal left M4. There is a patent  posterior communicating artery. Posterior circulation: Vertebral arteries are patent skull base. Basilar artery is patent without focal stenosis or occlusion. The bilateral superior cerebellar arteries appear patent. There is fetal-type origin of the left posterior cerebral artery. This arises from the aneurysmal segment of the left internal carotid artery. There is irregularity of  the distal branch of the left posterior cerebral artery compatible with intracranial atherosclerosis. There is occlusion of the P2 segment of the right posterior cerebral artery. No posterior circulation aneurysm identified. Nonvascular findings: Intracranial structures appear unremarkable.  Orbits are within normal limits.  Paranasal sinuses and mastoid air cells appear well aerated.  Superficial soft tissues and underlying musculature appear within normal limits.  The lung  apices are clear.  The thyroid and salivary glands appear unremarkable.  The suprahyoid and infrahyoid spaces of the neck appear unremarkable.  There is no evidence of cervical lymphadenopathy or a neck mass.  There are no acute osseous abnormalities or  destructive bone lesions.     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 2.Occlusion of the P2 segment of the right posterior cerebral artery. 3.Occlusion of the distal left M4 branch of the middle cerebral artery. 4.Intracranial atherosclerosis. Electronically Signed: Familia Karimi MD  7/21/2024 12:23 AM EDT  Workstation ID: [id number]    CT Angiogram Head w AI Analysis of LVO    Result Date: 7/21/2024  CT ANGIOGRAM HEAD W AI ANALYSIS OF LVO, CT ANGIOGRAM NECK Date of Exam: 7/20/2024 11:06 PM EDT Indication: confusion Acute Stroke. Comparison: CT angiogram of the head and neck from 7/1/2022 Technique: CTA of the head was performed after the uneventful intravenous administration of 115 mL Isovue-370. Reconstructed coronal and sagittal images were also obtained. In addition, a 3-D volume rendered image was created for interpretation. Automated exposure control and iterative reconstruction methods were used. Findings: Aortic arch: The aortic arch is unremarkable.  There is conventional 3 vessel arch anatomy.  The right brachiocephalic and visualized bilateral subclavian arteries are within normal limits. Right carotid: The right CCA arises as  the distal branch of the left posterior cerebral artery compatible with intracranial atherosclerosis. There is occlusion of the P2 segment of the right posterior cerebral artery. No posterior circulation aneurysm identified. Nonvascular findings: Intracranial structures appear unremarkable.  Orbits are within normal limits.  Paranasal sinuses and mastoid air cells appear well aerated.  Superficial soft tissues and underlying musculature appear within normal limits.  The lung  apices are clear.  The thyroid and salivary glands appear unremarkable.  The suprahyoid and infrahyoid spaces of the neck appear unremarkable.  There is no evidence of cervical lymphadenopathy or a neck mass.  There are no acute osseous abnormalities or  destructive bone lesions.     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 2.Occlusion of the P2 segment of the right posterior cerebral artery. 3.Occlusion of the distal left M4 branch of the middle cerebral artery. 4.Intracranial atherosclerosis. Electronically Signed: Familia Karimi MD  7/21/2024 12:23 AM EDT  Workstation ID: [id number]    CT CEREBRAL PERFUSION WITH & WITHOUT CONTRAST    Result Date: 7/21/2024  CT CEREBRAL PERFUSION W WO CONTRAST Date of Exam: 7/20/2024 11:06 PM EDT Indication: confusion.  Comparison: CT scan of the head dated 7/1/2022; MRI brain 7/2/2022 Technique: Axial CT images of the brain were obtained prior to and after the administration of 115 mL Isovue-370. Core blood volume, core blood flow, mean transit time, and Tmax images were obtained utilizing the Rapid software protocol. A limited CT angiogram of the head was also performed to measure the blood vessel density. The radiation dose reduction device was turned on for each scan per the ALARA (As Low as Reasonably Achievable) protocol. Findings: Cerebral blood flow, blood volume and mean transit time maps are symmetric without large perfusion defect. CBF<30% volume: 0  mL Tmax>6sec volume: 17 mL within the left parietal lobe in the distribution of the left middle cerebral artery. Mismatch volume: 17 mL Mismatch ratio: Infinite     Impression: 17 mL of ischemia in the left parietal lobe in the distribution of the left middle cerebral artery. Electronically Signed: Familia Karimi MD  7/21/2024 12:06 AM EDT  Workstation ID: [id number]    XR Chest 1 View    Result Date: 7/20/2024  XR CHEST 1 VW Date of Exam: 7/20/2024 11:03 PM EDT Indication: confusion Comparison: Chest radiograph dated 5/5/2024 Findings: The heart is enlarged. There is tortuosity of the thoracic aorta. The lungs are clear.     Impression: Cardiomegaly. No active disease. Electronically Signed: Familia Karimi MD  7/20/2024 11:50 PM EDT  Workstation ID: [id number]    CT Head Without Contrast Stroke Protocol    Result Date: 7/20/2024  CT HEAD WO CONTRAST STROKE PROTOCOL Date of Exam: 7/20/2024 11:05 PM EDT Indication: confusion. Comparison: None available. Technique: Axial CT images were obtained of the head without contrast administration.  Reconstructed coronal images were also obtained. Automated exposure control and iterative construction methods were used. Scan Time: 2307 Results discussed with Rep. from the stroke team at 11:16 p.m. Findings: There is mild diffuse generalized atrophy. There are low-attenuation areas in the periventricular white matter consistent with chronic microvascular ischemic change. There is no mass, mass effect or midline shift. There are no abnormal extra-axial fluid collections or areas of acute hemorrhage. The paranasal sinuses are clear. The mastoid air cells are clear.     Impression: Atrophy and chronic microvascular ischemic change. No acute intracranial process. Electronically Signed: Familia Karimi MD  7/20/2024 11:16 PM EDT  Workstation ID: [id number]     Results for orders placed during the hospital encounter of 07/20/24    Adult Transthoracic Echo Complete W/ Cont if Necessary Per Protocol  (With Agitated Saline)    Interpretation Summary    Left ventricular wall thickness is consistent with mild concentric hypertrophy.    Estimated right ventricular systolic pressure from tricuspid regurgitation is normal (<35 mmHg).    Mild dilation of the ascending aorta is present.    Discharge Details        Discharge Medications        Changes to Medications        Instructions Start Date   apixaban 5 MG tablet tablet  Commonly known as: ELIQUIS  What changed:   medication strength  See the new instructions.   5 mg, Oral, Every 12 Hours Scheduled             Continue These Medications        Instructions Start Date   acetaminophen 325 MG tablet  Commonly known as: TYLENOL   650 mg, Oral, Every 4 Hours PRN      atorvastatin 80 MG tablet  Commonly known as: LIPITOR   80 mg, Oral, Nightly      b complex-vitamin c-folic acid 0.8 MG tablet tablet   1 tablet, Oral, Daily      EYE VITAMINS PO   Oral               Allergies   Allergen Reactions    Procaine Anaphylaxis    Codeine Other (See Comments)     Passed out    Latex Itching    Morphine Itching    Penicillins Itching    Sulfa Antibiotics Hives    Doxycycline Palpitations    Keflex [Cephalexin] Palpitations     Discharge Disposition:Home or Self Care    Diet:  Diet Instructions       Diet: Regular/House Diet; Regular (IDDSI 7); Thin (IDDSI 0)      Discharge Diet: Regular/House Diet    Texture: Regular (IDDSI 7)    Fluid Consistency: Thin (IDDSI 0)           Activity:  Activity Instructions       Activity as Tolerated            No future appointments.    Additional Instructions for the Follow-ups that You Need to Schedule       Discharge Follow-up with Specified Provider: Stroke Clinic in 1 month   As directed      To: Stroke Clinic in 1 month              Carol Orta PA-C  07/24/24    Time Spent on Discharge:  I spent 35 minutes on this discharge activity which included: face-to-face encounter with the patient, reviewing the data in the system,  coordination of the care with the nursing staff as well as consultants, documentation, and entering orders.

## 2024-07-24 NOTE — PLAN OF CARE
Problem: Fall Injury Risk  Goal: Absence of Fall and Fall-Related Injury  Outcome: Ongoing, Progressing  Intervention: Identify and Manage Contributors  Recent Flowsheet Documentation  Taken 7/24/2024 0400 by Diana Cole RN  Medication Review/Management: medications reviewed  Taken 7/24/2024 0000 by Diana Cole RN  Medication Review/Management: medications reviewed  Taken 7/23/2024 2200 by Diana Cole RN  Medication Review/Management: medications reviewed  Taken 7/23/2024 2000 by Diana Cole RN  Medication Review/Management: medications reviewed  Intervention: Promote Injury-Free Environment  Recent Flowsheet Documentation  Taken 7/24/2024 0400 by Diana Cole RN  Safety Promotion/Fall Prevention:   activity supervised   safety round/check completed   room organization consistent   nonskid shoes/slippers when out of bed   lighting adjusted   fall prevention program maintained   clutter free environment maintained   assistive device/personal items within reach  Taken 7/24/2024 0000 by Diana Cole RN  Safety Promotion/Fall Prevention:   activity supervised   safety round/check completed   room organization consistent   nonskid shoes/slippers when out of bed   lighting adjusted   fall prevention program maintained   clutter free environment maintained   assistive device/personal items within reach  Taken 7/23/2024 2200 by Diana Cole RN  Safety Promotion/Fall Prevention:   activity supervised   safety round/check completed   room organization consistent   nonskid shoes/slippers when out of bed   lighting adjusted   fall prevention program maintained   clutter free environment maintained   assistive device/personal items within reach  Taken 7/23/2024 2000 by Diana Cole RN  Safety Promotion/Fall Prevention:   activity supervised   assistive device/personal items within reach   clutter free environment maintained   fall prevention program maintained   lighting adjusted    safety round/check completed   room organization consistent     Problem: Adult Inpatient Plan of Care  Goal: Plan of Care Review  Outcome: Ongoing, Progressing  Goal: Patient-Specific Goal (Individualized)  Outcome: Ongoing, Progressing  Goal: Absence of Hospital-Acquired Illness or Injury  Outcome: Ongoing, Progressing  Intervention: Identify and Manage Fall Risk  Recent Flowsheet Documentation  Taken 7/24/2024 0400 by Diana Cole, RN  Safety Promotion/Fall Prevention:   activity supervised   safety round/check completed   room organization consistent   nonskid shoes/slippers when out of bed   lighting adjusted   fall prevention program maintained   clutter free environment maintained   assistive device/personal items within reach  Taken 7/24/2024 0000 by Diana Cole RN  Safety Promotion/Fall Prevention:   activity supervised   safety round/check completed   room organization consistent   nonskid shoes/slippers when out of bed   lighting adjusted   fall prevention program maintained   clutter free environment maintained   assistive device/personal items within reach  Taken 7/23/2024 2200 by Diana Cole, RN  Safety Promotion/Fall Prevention:   activity supervised   safety round/check completed   room organization consistent   nonskid shoes/slippers when out of bed   lighting adjusted   fall prevention program maintained   clutter free environment maintained   assistive device/personal items within reach  Taken 7/23/2024 2000 by Diana Cole RN  Safety Promotion/Fall Prevention:   activity supervised   assistive device/personal items within reach   clutter free environment maintained   fall prevention program maintained   lighting adjusted   safety round/check completed   room organization consistent  Intervention: Prevent Skin Injury  Recent Flowsheet Documentation  Taken 7/24/2024 0400 by Diana Cole, RN  Body Position:   position changed independently   turned   left   head facing,  left  Skin Protection:   adhesive use limited   transparent dressing maintained   skin-to-skin areas padded  Taken 7/24/2024 0000 by Diana Cole RN  Body Position:   position changed independently   head facing, left   turned   left  Skin Protection:   adhesive use limited   tubing/devices free from skin contact   transparent dressing maintained  Taken 7/23/2024 2200 by Diana Cole RN  Body Position:   right   head facing, right   position changed independently  Taken 7/23/2024 2000 by Diana Cole RN  Body Position: sitting up in bed  Skin Protection:   adhesive use limited   transparent dressing maintained  Intervention: Prevent and Manage VTE (Venous Thromboembolism) Risk  Recent Flowsheet Documentation  Taken 7/23/2024 2000 by Diana Cole RN  Activity Management: activity encouraged  VTE Prevention/Management: (patient takes eliquis) other (see comments)  Range of Motion: active ROM (range of motion) encouraged  Intervention: Prevent Infection  Recent Flowsheet Documentation  Taken 7/24/2024 0400 by Diana Cole RN  Infection Prevention:   cohorting utilized   rest/sleep promoted   hand hygiene promoted   environmental surveillance performed  Taken 7/24/2024 0000 by Diana Cole RN  Infection Prevention:   cohorting utilized   rest/sleep promoted   hand hygiene promoted   environmental surveillance performed  Taken 7/23/2024 2200 by Diana Cole RN  Infection Prevention:   cohorting utilized   rest/sleep promoted   hand hygiene promoted   environmental surveillance performed  Taken 7/23/2024 2000 by Diana Cole RN  Infection Prevention:   cohorting utilized   environmental surveillance performed   hand hygiene promoted   rest/sleep promoted  Goal: Optimal Comfort and Wellbeing  Outcome: Ongoing, Progressing  Intervention: Provide Person-Centered Care  Recent Flowsheet Documentation  Taken 7/23/2024 2000 by Diana Cole RN  Trust Relationship/Rapport:   care  explained   choices provided   emotional support provided   empathic listening provided   questions answered   reassurance provided   thoughts/feelings acknowledged  Goal: Readiness for Transition of Care  Outcome: Ongoing, Progressing     Problem: Skin Injury Risk Increased  Goal: Skin Health and Integrity  Outcome: Ongoing, Progressing  Intervention: Optimize Skin Protection  Recent Flowsheet Documentation  Taken 7/24/2024 0400 by Diana Cole RN  Pressure Reduction Techniques:   frequent weight shift encouraged   weight shift assistance provided  Head of Bed (HOB) Positioning: HOB elevated  Pressure Reduction Devices:   pressure-redistributing mattress utilized   positioning supports utilized  Skin Protection:   adhesive use limited   transparent dressing maintained   skin-to-skin areas padded  Taken 7/24/2024 0000 by Diana Cole RN  Pressure Reduction Techniques:   frequent weight shift encouraged   weight shift assistance provided  Head of Bed (HOB) Positioning: HOB elevated  Pressure Reduction Devices:   pressure-redistributing mattress utilized   positioning supports utilized  Skin Protection:   adhesive use limited   tubing/devices free from skin contact   transparent dressing maintained  Taken 7/23/2024 2200 by Diana Cole RN  Head of Bed (HOB) Positioning: HOB elevated  Taken 7/23/2024 2000 by Diana Cole RN  Pressure Reduction Techniques:   weight shift assistance provided   frequent weight shift encouraged  Head of Bed (HOB) Positioning: HOB elevated  Pressure Reduction Devices:   pressure-redistributing mattress utilized   positioning supports utilized  Skin Protection:   adhesive use limited   transparent dressing maintained     Problem: Adjustment to Illness (Stroke, Ischemic/Transient Ischemic Attack)  Goal: Optimal Coping  Outcome: Ongoing, Progressing  Intervention: Support Psychosocial Response to Stroke  Recent Flowsheet Documentation  Taken 7/23/2024 2000 by Douglas  STEVEN Ortiz  Supportive Measures: active listening utilized  Family/Support System Care:   support provided   self-care encouraged     Problem: Bowel Elimination Impaired (Stroke, Ischemic/Transient Ischemic Attack)  Goal: Effective Bowel Elimination  Outcome: Ongoing, Progressing  Intervention: Promote Effective Bowel Elimination  Recent Flowsheet Documentation  Taken 7/23/2024 2000 by Diana Cole RN  Bowel Elimination Management: relaxation techniques promoted     Problem: Cerebral Tissue Perfusion (Stroke, Ischemic/Transient Ischemic Attack)  Goal: Optimal Cerebral Tissue Perfusion  Outcome: Ongoing, Progressing  Intervention: Protect and Optimize Cerebral Perfusion  Recent Flowsheet Documentation  Taken 7/23/2024 2000 by Diana Cole RN  Sensory Stimulation Regulation:   care clustered   quiet environment promoted   television on     Problem: Cognitive Impairment (Stroke, Ischemic/Transient Ischemic Attack)  Goal: Optimal Cognitive Function  Outcome: Ongoing, Progressing  Intervention: Optimize Cognitive Function  Recent Flowsheet Documentation  Taken 7/23/2024 2000 by Diana Cole RN  Sensory Stimulation Regulation:   care clustered   quiet environment promoted   television on  Reorientation Measures: glasses use encouraged  Environment Familiarity/Consistency: daily routine followed     Problem: Communication Impairment (Stroke, Ischemic/Transient Ischemic Attack)  Goal: Improved Communication Skills  Outcome: Ongoing, Progressing  Intervention: Optimize Communication Skills  Recent Flowsheet Documentation  Taken 7/23/2024 2000 by Diana Cole RN  Communication Enhancement Strategies:   call light answered in person   device use encouraged   extra time allowed for response   repetition utilized     Problem: Functional Ability Impaired (Stroke, Ischemic/Transient Ischemic Attack)  Goal: Optimal Functional Ability  Outcome: Ongoing, Progressing  Intervention: Optimize Functional  Ability  Recent Flowsheet Documentation  Taken 7/23/2024 2000 by Diana Cole RN  Activity Management: activity encouraged     Problem: Respiratory Compromise (Stroke, Ischemic/Transient Ischemic Attack)  Goal: Effective Oxygenation and Ventilation  Outcome: Ongoing, Progressing  Intervention: Optimize Oxygenation and Ventilation  Recent Flowsheet Documentation  Taken 7/24/2024 0400 by Diana Cole RN  Head of Bed (HOB) Positioning: HOB elevated  Taken 7/24/2024 0000 by Diana Cole RN  Head of Bed (HOB) Positioning: HOB elevated  Taken 7/23/2024 2200 by Diana Cole RN  Head of Bed (HOB) Positioning: HOB elevated  Taken 7/23/2024 2000 by Diana Cole RN  Head of Bed (HOB) Positioning: HOB elevated     Problem: Sensorimotor Impairment (Stroke, Ischemic/Transient Ischemic Attack)  Goal: Improved Sensorimotor Function  Outcome: Ongoing, Progressing  Intervention: Optimize Range of Motion, Motor Control and Function  Recent Flowsheet Documentation  Taken 7/24/2024 0400 by Diana Cole RN  Positioning/Transfer Devices:   pillows   in use  Taken 7/24/2024 0000 by Diana Cole RN  Positioning/Transfer Devices:   pillows   in use  Taken 7/23/2024 2200 by Diana Cole RN  Positioning/Transfer Devices:   pillows   in use  Taken 7/23/2024 2000 by Diana Cole RN  Positioning/Transfer Devices:   pillows   in use  Range of Motion: active ROM (range of motion) encouraged  Intervention: Optimize Sensory and Perceptual Ability  Recent Flowsheet Documentation  Taken 7/24/2024 0400 by Diana Cole RN  Pressure Reduction Techniques:   frequent weight shift encouraged   weight shift assistance provided  Pressure Reduction Devices:   pressure-redistributing mattress utilized   positioning supports utilized  Taken 7/24/2024 0000 by Diana Cole RN  Pressure Reduction Techniques:   frequent weight shift encouraged   weight shift assistance provided  Pressure Reduction Devices:    pressure-redistributing mattress utilized   positioning supports utilized  Taken 7/23/2024 2000 by Diana Cole RN  Pressure Reduction Techniques:   weight shift assistance provided   frequent weight shift encouraged  Pressure Reduction Devices:   pressure-redistributing mattress utilized   positioning supports utilized     Problem: Swallowing Impairment (Stroke, Ischemic/Transient Ischemic Attack)  Goal: Optimal Eating and Swallowing without Aspiration  Outcome: Ongoing, Progressing  Intervention: Optimize Eating and Swallowing  Recent Flowsheet Documentation  Taken 7/24/2024 0400 by Diana Cole RN  Aspiration Precautions: upright posture maintained  Taken 7/24/2024 0000 by Diana Cole RN  Aspiration Precautions: upright posture maintained  Taken 7/23/2024 2200 by Diana Cole RN  Aspiration Precautions: upright posture maintained  Taken 7/23/2024 2000 by Diana Cole RN  Aspiration Precautions: upright posture maintained     Problem: Urinary Elimination Impaired (Stroke, Ischemic/Transient Ischemic Attack)  Goal: Effective Urinary Elimination  Outcome: Ongoing, Progressing  Intervention: Promote Effective Bladder Elimination  Recent Flowsheet Documentation  Taken 7/23/2024 2000 by Diana Cole RN  Urinary Elimination Promotion: (pt wears purewick) other (see comments)   Goal Outcome Evaluation:    . Diana Cole RN

## 2024-07-24 NOTE — PLAN OF CARE
Goal Outcome Evaluation:                     Anticipated Discharge Disposition (SLP): inpatient rehabilitation facility    SLP Diagnosis: mild-moderate, aphasia (07/24/24 0950)        Treatment Assessment (SLP): continued, mild-moderate, aphasia (07/24/24 0950)     Plan for Continued Treatment (SLP): continue treatment per plan of care (07/24/24 0950)

## 2024-07-24 NOTE — CASE MANAGEMENT/SOCIAL WORK
Case Management Discharge Note      Final Note: Ms. Jacob is being discharged today. She will be going to [facility] Stroke Unit. Nurse to call report to [phone].  faxed the discharge summary to [phone]. [facility] van will transport her at 1430. She will need to be down at the Maternity Entrance at 1420.    Provided Post Acute Provider List?: Yes  Post Acute Provider List: Inpatient Rehab  Provided Post Acute Provider Quality & Resource List?: Yes  Post Acute Provider Quality and Resource List: Inpatient Rehab  Delivered To: Support Person  Support Person: Memo  Method of Delivery: Telephone    Selected Continued Care - Admitted Since 7/20/2024       Destination    No services have been selected for the patient.                Durable Medical Equipment    No services have been selected for the patient.                Dialysis/Infusion    No services have been selected for the patient.                Home Medical Care    No services have been selected for the patient.                Therapy    No services have been selected for the patient.                Community Resources    No services have been selected for the patient.                Community & DME    No services have been selected for the patient.                         Final Discharge Disposition Code: 62 - inpatient rehab facility

## 2024-07-24 NOTE — TELEPHONE ENCOUNTER
Caller: HASEEB    Relationship to patient:  [facility]    Best call back number: [phone]    New or established patient?  [] New  [x] Established    Date of discharge: 07/24/24    Facility discharged from:  [facility]    Diagnosis/Symptoms: CVA    Length of stay (If applicable):     Specialty Only: Did you see a [facility] provider?    [x] Yes  [] No  If so, who? TAMMY GRANADO    PT NEEDS 1 MONTH HOS FU.   NEXT AVAILABLE IS 9/18/24.  PLEASE CALL IF SOONER APPT AVAILABLE    THANK YOU

## 2024-07-24 NOTE — PROGRESS NOTES
Stroke Progress Note    Presenting Chief Complaint: Altered mental status    24-hour Events  Afebrile, blood pressure trend within goal, no acute events reported.    Subjective:  Patient is found lying in bed in no acute distress.  Patient answer simple orientation questions appropriately.  Patient remains mildly confused about recent events today with some mixed aphasia.  But continued progressive improvement of her mental status is notable since Monday.  Patient denies headache, nausea/vomiting, diarrhea.  Patient states she had a bowel movement yesterday with no kenton blood.    Review of Systems   Constitutional:  Negative for chills and fatigue.   HENT:  Negative for trouble swallowing.    Eyes:  Negative for visual disturbance.   Respiratory:  Negative for shortness of breath.    Cardiovascular:  Negative for chest pain.   Gastrointestinal:  Negative for abdominal pain.   Musculoskeletal:  Negative for myalgias.   Neurological:  Negative for tremors, facial asymmetry, speech difficulty, weakness, light-headedness, numbness and headaches.   Psychiatric/Behavioral:  Negative for behavioral problems. The patient is not nervous/anxious.       Objective  Neurological Exam  Mental Status  Alert. Oriented to person, place, time and situation. Speech is normal. Mixed aphasia present. Attention and concentration are normal. Fund of knowledge is abnormal.  Patient answer simple orientation questions appropriately.  Patient remains mildly confused with mixed aphasia, however mental status is notably improved since yesterday..    Cranial Nerves  CN II: Right-sided visual field deficit.  CN III, IV, VI: Extraocular movements intact bilaterally. Pupils equal round and reactive to light bilaterally.  CN V:  Right: Facial sensation is normal.  Left: Facial sensation is normal on the left.  CN VII:  Right: There is no facial weakness.  Left: There is no facial weakness.  CN VIII: Hearing grossly intact bilaterally.  CN IX, X:  Palate elevates symmetrically  CN XII: Tongue midline without atrophy or fasciculations.    Motor  Normal muscle bulk throughout. Normal muscle tone.  All extremities at least 4/5 strength, no drift.    Sensory  Light touch is normal in upper and lower extremities.     Coordination  Right: Rapid alternating movement normal.Left: Rapid alternating movement normal.    Physical Exam  Constitutional:       General: She is not in acute distress.  HENT:      Head: Normocephalic and atraumatic.      Mouth/Throat:      Mouth: Mucous membranes are moist.   Eyes:      Extraocular Movements: Extraocular movements intact.      Pupils: Pupils are equal, round, and reactive to light.   Cardiovascular:      Rate and Rhythm: Normal rate and regular rhythm.   Pulmonary:      Effort: Pulmonary effort is normal.   Musculoskeletal:         General: No swelling or deformity.   Skin:     General: Skin is warm and dry.   Neurological:      Mental Status: She is alert.   Psychiatric:         Mood and Affect: Mood normal.         Speech: Speech normal.         Behavior: Behavior normal.     Temp:  [97.7 °F (36.5 °C)-98.4 °F (36.9 °C)] 97.7 °F (36.5 °C)  Heart Rate:  [77-95] 79  Resp:  [17-18] 18  BP: (123-160)/(50-97) 147/91    Results Review:    I reviewed the patient's new clinical results in the last 24 hours.    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 2.Occlusion of the P2 segment of the right posterior cerebral artery. 3.Occlusion of the distal left M4 branch of the middle cerebral artery. 4.Intracranial atherosclerosis. Electronically Signed: Familia Karimi MD  7/21/2024 12:23 AM EDT  Workstation ID: [id number]     CT CEREBRAL PERFUSION WITH & WITHOUT CONTRAST     Result Date: 7/21/2024  Impression: 17 mL of ischemia in the left parietal lobe in the distribution of the left middle cerebral artery. Electronically  Signed: Familia Karimi MD  7/21/2024 12:06 AM EDT  Workstation ID: [id number]     CT Head Without Contrast Stroke Protocol     Result Date: 7/20/2024  Impression: Atrophy and chronic microvascular ischemic change. No acute intracranial process. Electronically Signed: Familia Karimi MD  7/20/2024 11:16 PM EDT  Workstation ID: [id number]     MRI Brain Without Contrast    Result Date: 7/22/2024  MRI BRAIN WO CONTRAST Date of Exam: 7/22/2024 4:57 AM EDT Indication: Stroke, follow up stroke.  Comparison: CT scan the head dated 7/20/2024 Technique:  Routine multiplanar/multisequence sequence images of the brain were obtained without contrast administration. Findings: There is mild diffuse generalized atrophy. There are areas of increased T2 and FLAIR signal throughout the bilateral periventricular and subcortical white matter consistent with chronic microvascular ischemia. There is pathologic restricted diffusion in the posterior left parietal lobe in the distribution of the left middle cerebral artery. There is no evidence of an acute hemorrhage. There is no mass or mass effect. There are no abnormal extra-axial fluid collections.     Impression: Impression: Acute infarct in the distribution of the left middle cerebral artery. Electronically Signed: Familia Karimi MD  7/22/2024 6:29 AM EDT  Workstation ID: [id number]      Results for orders placed during the hospital encounter of 07/01/22   Adult Transthoracic Echo Complete W/ Cont if Necessary Per Protocol (With Agitated Saline)   Interpretation Summary  · Left ventricular wall thickness is consistent with mild concentric hypertrophy.  · Normal left ventricular systolic function, estimated EF 55%.  · Left ventricular diastolic function is consistent with (grade I) impaired relaxation.  · Aortic sclerosis with mild aortic insufficiency, no evidence of aortic stenosis.  · Mild mitral vegetation.  · Trace to mild tricuspid regurgitation with normal RVSP.    Significant Labs  A1c on  7/21: 5.10  LDL on 7/21: 156    Assessment and Plan:  This patient is an 89-year-old female with multiple vascular risk factors, including atrial fibrillation (on Eliquis, questionable adherence) who presented to [facility] ED 7/20/2024 with concern for transient and recurrent neurologic symptoms to include altered mental status, left facial droop, speech difficulties, and disequilibrium.  Patient was not a candidate for IV thrombolytic therapy or emergent neurointervention.  Found to have left MCA territory stroke on MRI.    Acute stroke, left MCA territory  Left M3/M4 occlusion  Supraclinoid ICA aneurysm, stable  -Etiology likely cardioembolic in setting of nonadherence to her oral anticoagulation  -TTE no evidence of LAE, saline test not performed  -Patient transitioned from heparin drip to home Eliquis dose 7/23  -Spoke with patient's son, Memo, via phone 7/21.  Discussed importance of adherence to home Eliquis and other medications.  Patient's son stated the patient lives with a family friend who typically assists her with medications.  Family friend i was expected at bedside but has not arrived either the past 2 days.  Please contact stroke neurology team to have face-to-face discussion to reinforce medication adherence with any family/friends that arrive at bedside prior to discharge.  -Plan to update family/caregiver if they arrive at bedside today.  -Patient likely a candidate for watchman per cardiology  -Continue atorvastatin 80 mg nightly  -SBP goal 110-160, continue to follow with outpatient cardiology/PCP for management  -Continue PT/OT/SLP as appropriate, agree with recommendations for inpatient rehab at discharge  -Follow-up in stroke clinic 1 month after discharge  -Recommend outpatient follow-up with neurosurgery for ICA aneurysm    Disposition: Patient is appropriate for discharge from stroke neurology perspective.      Case discussed with the patient, bedside RN, and Dr. Graves.  Stroke neurology  will sign off.  Please contact stroke team with any further questions or concerns prior to discharge.      [place] Mata PA-C  [facility] Stroke Neurology

## 2024-07-24 NOTE — DISCHARGE PLACEMENT REQUEST
"FROM: Niki AVALOS, RN,  [phone]  Yolie Jacob (89 y.o. Female)       Date of Birth   1935    Social Security Number       Address   [address] [place] [address] [place] [address]    Home Phone   [phone]    MRN   [id number]       [facility]    Marital Status                               Admission Date   24    Admission Type   Emergency    Admitting Provider   Lety Bolden MD    Attending Provider   Lety Bolden MD    Department, Room/Bed   [facility] 3E, S337/1       Discharge Date       Discharge Disposition   Home or Self Care    Discharge Destination                                 Attending Provider: Lety Bolden MD    Allergies: Procaine, Codeine, Latex, Morphine, Penicillins, Sulfa Antibiotics, Doxycycline, Keflex [Cephalexin]    Isolation: None   Infection: None   Code Status: Prior    Ht: 142.2 cm (55.98\")   Wt: 62.6 kg (138 lb 0.1 oz)    Admission Cmt: None   Principal Problem: CVA (cerebral vascular accident) [I63.9]                   Active Insurance as of 2024       Primary Coverage       Payor Plan Insurance Group Employer/Plan Group    MEDICARE MEDICARE A & B        Payor Plan Address Payor Plan Phone Number Payor Plan Fax Number Effective Dates    [address] [phone]  2000 - None Entered    [place] [address]         Subscriber Name Subscriber Birth Date Member ID       YOLIE JACOB 1935 [id number]                     Emergency Contacts        (Rel.) Home Phone Work Phone Mobile Phone    KERI JACOB (Son) [phone] -- [phone]                 Discharge Summary        Carol Orta PA-C at 24 1003                [facility] Medicine Services  DISCHARGE SUMMARY    Patient Name: Yolie Jacob  : 1935  MRN: [id number]    Date of Admission: 2024 10:58 PM  Date of Discharge:  2024  Primary Care Physician: Mrain Castañeda, " DO    Hospital Course     Active Hospital Problems    Diagnosis  POA    **CVA (cerebral vascular accident) [I63.9]  Yes    H/O cardioembolic CVA [Z86.73]  Not Applicable    Atrial fibrillation [I48.91]  Yes      Resolved Hospital Problems   No resolved problems to display.      Hospital Course:  Ms. Yolie Jacob is an 89yoF with PMH significant for prior cardioembolic CVA and paroxysmal atrial fibrillation (noncompliant with Eliquis). She was brought to [facility] ED on 7/20/24 for evaluation of L facial droop and expressive aphasia. Last known well was on 7/19. Around 0100 on 7/20, she was noted to have L facial droop and some slurred speech. Around 1500 on 7/20, facial droop had resolved but she was noted to have ongoing speech difficulties as well as confusion and inability to follow directions. She was found to have an acute L MCA CVA.     Acute left MCA CVA with expressive aphasia and R hemineglect   Supraclinoid ICA aneurysm   History of cardioembolic CVA  History of R PCA CVA   Paroxysmal atrial fibrillation  Noncompliance with Eliquis  - Stroke neurology team has followed   - MRI confirmed L MCA CVA stroke   - s/p Heparin gtt - now back on Eliquis. Compliance is paramount. Per cardiology, she is likely a candidate for Watchman device placement if can overcome Eliquis compliance barriers. Patient's son reports that the patient lives with a family friend who typically assists her with medications but this person has not been present for stroke team to discuss Eliquis compliance with them. Did speak with patient's son who can hopefully communicate this with family friend   - Will need outpatient follow up with neurosurgery for ICA aneurysm  - Continue ASA and high-intensity statin  - Goal -160  - PT/OT/SLP following - rehab planned  - Follow up with stroke clinic in 1 month     Day of Discharge     HPI:   Sitting up in bed. Says she is having trouble reading. Notes a history of macular  degeneration but feels vision has gotten worse. Cannot tell me the chronicity of the worsening. Denies chest pain, dyspnea, abdominal pain, nausea or vomiting. To [facility] today     Review of Systems  Gen- No fevers, chills  CV- No chest pain, palpitations  Resp- No cough, dyspnea  GI- No N/V/D, abd pain    Vital Signs:   Temp:  [97.7 °F (36.5 °C)-98.4 °F (36.9 °C)] 97.7 °F (36.5 °C)  Heart Rate:  [77-88] 79  Resp:  [17-18] 18  BP: (126-160)/(50-97) 147/91    Physical Exam:  Constitutional: No acute distress, awake, alert and conversant. Sitting in bed   HENT: NCAT, mucous membranes moist  Respiratory: Clear to auscultation bilaterally, respiratory effort normal   Cardiovascular: RRR  Gastrointestinal: Positive bowel sounds, soft, nontender, nondistended  Musculoskeletal: No bilateral ankle edema  Psychiatric: Appropriate affect, cooperative with exam  Neurologic: Oriented x 3, right-sided weakness, difficulty following commands with abnormal finger to nose test, speech is clear expressive aphasia noted  Skin: No rashes to exposed surfaces     Pertinent  and/or Most Recent Results     LAB RESULTS:      Lab 07/23/24  0027 07/22/24  1807 07/22/24  1023 07/22/24  0336 07/21/24  1937 07/21/24  1246 07/21/24  1246 07/20/24  2326 07/20/24  2313 07/20/24  2312   WBC  --   --   --  5.33  --   --  5.16 5.37  --   --    HEMOGLOBIN  --   --   --  13.9  --   --  14.0 15.1  --   --    HEMOGLOBIN, POC  --   --   --   --   --   --   --   --  15.6  --    HEMATOCRIT  --   --   --  41.5  --   --  41.1 46.5  --   --    HEMATOCRIT POC  --   --   --   --   --   --   --   --  46  --    PLATELETS  --   --   --  195  --   --  206 235  --   --    NEUTROS ABS  --   --   --  3.84  --   --  4.01 3.97  --   --    IMMATURE GRANS (ABS)  --   --   --  0.03  --   --  0.01 0.06*  --   --    LYMPHS ABS  --   --   --  0.78  --   --  0.57* 0.72  --   --    MONOS ABS  --   --   --  0.56  --   --  0.48 0.48  --   --    EOS ABS  --   --   --  0.10  --   --   0.07 0.12  --   --    MCV  --   --   --  87.7  --   --  85.8 88.9  --   --    PROTIME  --   --   --   --   --   --  13.5  --   --  13.1   APTT  --   --   --   --   --   --  26.7*  --   --   --    HEPARIN ANTI-XA 0.46 0.42 0.22* 0.16* 0.10*   < > 0.10*  --   --   --     < > = values in this interval not displayed.         Lab 07/23/24  2217 07/23/24  1052 07/21/24  1234 07/20/24 2326 07/20/24 2313   SODIUM  --  138  --  138  --    POTASSIUM 4.6 3.1*  --  3.6  --    CHLORIDE  --  103  --  104  --    CO2  --  23.0  --  22.0  --    ANION GAP  --  12.0  --  12.0  --    BUN  --  15  --  13  --    CREATININE  --  0.76  --  0.71 0.70   EGFR  --  75.0  --  81.4 82.8   GLUCOSE  --  169*  --  131*  --    CALCIUM  --  8.7  --  8.9  --    MAGNESIUM  --  2.1  --   --   --    HEMOGLOBIN A1C  --   --  5.10  --   --          Lab 07/20/24 2326   TOTAL PROTEIN 6.4   ALBUMIN 3.9   GLOBULIN 2.5   ALT (SGPT) 21   AST (SGOT) 26   BILIRUBIN 0.4   ALK PHOS 105         Lab 07/21/24  1246 07/20/24  2326 07/20/24  2312   HSTROP T  --  13  --    PROTIME 13.5  --  13.1   INR 1.02  --  1.1         Lab 07/21/24  1234   CHOLESTEROL 231*   LDL CHOL 156*   HDL CHOL 40   TRIGLYCERIDES 188*             Brief Urine Lab Results  (Last result in the past 365 days)        Color   Clarity   Blood   Leuk Est   Nitrite   Protein   CREAT   Urine HCG        07/21/24 0016 Yellow   Clear   Negative   Negative   Negative   Negative                 Microbiology Results (last 10 days)       Procedure Component Value - Date/Time    COVID PRE-OP / PRE-PROCEDURE SCREENING ORDER (NO ISOLATION) - Swab, Nasopharynx [[id number]]  (Normal) Collected: 07/20/24 7271    Lab Status: Final result Specimen: Swab from Nasopharynx Updated: 07/21/24 0146    Narrative:      The following orders were created for panel order COVID PRE-OP / PRE-PROCEDURE SCREENING ORDER (NO ISOLATION) - Swab, Nasopharynx.  Procedure                               Abnormality         Status                      ---------                               -----------         ------                     COVID-19 and FLU A/B PCR...[[id number]]  Normal              Final result                 Please view results for these tests on the individual orders.    COVID-19 and FLU A/B PCR, 1 HR TAT - Swab, Nasopharynx [[id number]]  (Normal) Collected: 07/20/24 2342    Lab Status: Final result Specimen: Swab from Nasopharynx Updated: 07/21/24 0149     COVID19 Not Detected     Influenza A PCR Not Detected     Influenza B PCR Not Detected    Narrative:      Fact sheet for providers: https://www.fda.gov/media/074530/download    Fact sheet for patients: https://www.fda.gov/media/575903/download    Test performed by PCR.            MRI Brain Without Contrast    Result Date: 7/22/2024  MRI BRAIN WO CONTRAST Date of Exam: 7/22/2024 4:57 AM EDT Indication: Stroke, follow up stroke.  Comparison: CT scan the head dated 7/20/2024 Technique:  Routine multiplanar/multisequence sequence images of the brain were obtained without contrast administration. Findings: There is mild diffuse generalized atrophy. There are areas of increased T2 and FLAIR signal throughout the bilateral periventricular and subcortical white matter consistent with chronic microvascular ischemia. There is pathologic restricted diffusion in the posterior left parietal lobe in the distribution of the left middle cerebral artery. There is no evidence of an acute hemorrhage. There is no mass or mass effect. There are no abnormal extra-axial fluid collections.     Impression: Acute infarct in the distribution of the left middle cerebral artery. Electronically Signed: Familia Karimi MD  7/22/2024 6:29 AM EDT  Workstation ID: [id number]    XR Abdomen KUB    Result Date: 7/21/2024  XR ABDOMEN KUB Date of Exam: 7/21/2024 9:37 PM EDT Indication: mri clearance Comparison: None available. Findings: No gross free air or pneumatosis though evaluation is slightly limited due to technique. There  is a non obstructive bowel gas pattern. A normal stool burden is present.  No suspicious calcifications identified. No acute osseous abnormality identified. There are clips from cholecystectomy. There are no metallic foreign bodies to prevent MRI. Review of the patient's chest x-ray also demonstrates no evidence of metallic foreign body to prevent MRI.     Impression: No acute abnormality. No metallic foreign body to prevent MRI. Electronically Signed: Familia Karimi MD  7/21/2024 10:48 PM EDT  Workstation ID: [id number]    Adult Transthoracic Echo Complete W/ Cont if Necessary Per Protocol (With Agitated Saline)    Result Date: 7/21/2024    Left ventricular wall thickness is consistent with mild concentric hypertrophy.   Estimated right ventricular systolic pressure from tricuspid regurgitation is normal (<35 mmHg).   Mild dilation of the ascending aorta is present.     CT Angiogram Neck    Result Date: 7/21/2024  CT ANGIOGRAM HEAD W AI ANALYSIS OF LVO, CT ANGIOGRAM NECK Date of Exam: 7/20/2024 11:06 PM EDT Indication: confusion Acute Stroke. Comparison: CT angiogram of the head and neck from 7/1/2022 Technique: CTA of the head was performed after the uneventful intravenous administration of 115 mL Isovue-370. Reconstructed coronal and sagittal images were also obtained. In addition, a 3-D volume rendered image was created for interpretation. Automated exposure control and iterative reconstruction methods were used. Findings: Aortic arch: The aortic arch is unremarkable.  There is conventional 3 vessel arch anatomy.  The right brachiocephalic and visualized bilateral subclavian arteries are within normal limits. Right carotid: The right CCA arises as expected from the brachiocephalic trunk.  The CCA follows a normal course and appears normal caliber.  The carotid bifurcation is unremarkable.  The external carotid artery and distal branches appear within normal limits.  The cervical internal carotid artery follows a  normal course and appears normal caliber throughout the neck and into the head.  The intracranial ICA segments appear within normal limits.  The ophthalmic artery origin is normal.  The A1 and M1 segments appear within normal limits.  The visualized distal MANN and MCA branches appear patent.  There is  a patent  anterior communicating artery. There is a patent  posterior communicating artery. Left carotid: The left CCA arises as expected from the aortic arch.   The CCA follows a normal course and appears normal caliber.  The carotid bifurcation is unremarkable.  The external carotid artery and distal branches appear within normal limits.  The  cervical internal carotid artery follows a normal course and appears normal caliber throughout the neck and into the head. There is fusiform aneurysmal enlargement of the supraclinoid portion of the left internal carotid artery up to 8 mm, similar to the prior study. The ophthalmic artery origin is normal.  The A1 and M1 segments appear within normal limits. There is slight irregularity of the left anterior cerebral artery consistent with intracranial atherosclerosis. There is slight narrowing of the  proximal left M3 branch of the middle cerebral artery without occlusion. There is truncation of the distal left M4. There is a patent  posterior communicating artery. Posterior circulation: Vertebral arteries are patent skull base. Basilar artery is patent without focal stenosis or occlusion. The bilateral superior cerebellar arteries appear patent. There is fetal-type origin of the left posterior cerebral artery. This arises from the aneurysmal segment of the left internal carotid artery. There is irregularity of the distal branch of the left posterior cerebral artery compatible with intracranial atherosclerosis. There is occlusion of the P2 segment of the right posterior cerebral artery. No posterior circulation aneurysm identified. Nonvascular findings: Intracranial structures  appear unremarkable.  Orbits are within normal limits.  Paranasal sinuses and mastoid air cells appear well aerated.  Superficial soft tissues and underlying musculature appear within normal limits.  The lung  apices are clear.  The thyroid and salivary glands appear unremarkable.  The suprahyoid and infrahyoid spaces of the neck appear unremarkable.  There is no evidence of cervical lymphadenopathy or a neck mass.  There are no acute osseous abnormalities or  destructive bone lesions.     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 2.Occlusion of the P2 segment of the right posterior cerebral artery. 3.Occlusion of the distal left M4 branch of the middle cerebral artery. 4.Intracranial atherosclerosis. Electronically Signed: Familia Karimi MD  7/21/2024 12:23 AM EDT  Workstation ID: [id number]    CT Angiogram Head w AI Analysis of LVO    Result Date: 7/21/2024  CT ANGIOGRAM HEAD W AI ANALYSIS OF LVO, CT ANGIOGRAM NECK Date of Exam: 7/20/2024 11:06 PM EDT Indication: confusion Acute Stroke. Comparison: CT angiogram of the head and neck from 7/1/2022 Technique: CTA of the head was performed after the uneventful intravenous administration of 115 mL Isovue-370. Reconstructed coronal and sagittal images were also obtained. In addition, a 3-D volume rendered image was created for interpretation. Automated exposure control and iterative reconstruction methods were used. Findings: Aortic arch: The aortic arch is unremarkable.  There is conventional 3 vessel arch anatomy.  The right brachiocephalic and visualized bilateral subclavian arteries are within normal limits. Right carotid: The right CCA arises as expected from the brachiocephalic trunk.  The CCA follows a normal course and appears normal caliber.  The carotid bifurcation is unremarkable.  The external carotid artery and distal branches appear within normal limits.  The cervical internal carotid artery follows a normal  course and appears normal caliber throughout the neck and into the head.  The intracranial ICA segments appear within normal limits.  The ophthalmic artery origin is normal.  The A1 and M1 segments appear within normal limits.  The visualized distal MANN and MCA branches appear patent.  There is  a patent  anterior communicating artery. There is a patent  posterior communicating artery. Left carotid: The left CCA arises as expected from the aortic arch.   The CCA follows a normal course and appears normal caliber.  The carotid bifurcation is unremarkable.  The external carotid artery and distal branches appear within normal limits.  The  cervical internal carotid artery follows a normal course and appears normal caliber throughout the neck and into the head. There is fusiform aneurysmal enlargement of the supraclinoid portion of the left internal carotid artery up to 8 mm, similar to the prior study. The ophthalmic artery origin is normal.  The A1 and M1 segments appear within normal limits. There is slight irregularity of the left anterior cerebral artery consistent with intracranial atherosclerosis. There is slight narrowing of the  proximal left M3 branch of the middle cerebral artery without occlusion. There is truncation of the distal left M4. There is a patent  posterior communicating artery. Posterior circulation: Vertebral arteries are patent skull base. Basilar artery is patent without focal stenosis or occlusion. The bilateral superior cerebellar arteries appear patent. There is fetal-type origin of the left posterior cerebral artery. This arises from the aneurysmal segment of the left internal carotid artery. There is irregularity of the distal branch of the left posterior cerebral artery compatible with intracranial atherosclerosis. There is occlusion of the P2 segment of the right posterior cerebral artery. No posterior circulation aneurysm identified. Nonvascular findings: Intracranial structures appear  unremarkable.  Orbits are within normal limits.  Paranasal sinuses and mastoid air cells appear well aerated.  Superficial soft tissues and underlying musculature appear within normal limits.  The lung  apices are clear.  The thyroid and salivary glands appear unremarkable.  The suprahyoid and infrahyoid spaces of the neck appear unremarkable.  There is no evidence of cervical lymphadenopathy or a neck mass.  There are no acute osseous abnormalities or  destructive bone lesions.     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 2.Occlusion of the P2 segment of the right posterior cerebral artery. 3.Occlusion of the distal left M4 branch of the middle cerebral artery. 4.Intracranial atherosclerosis. Electronically Signed: Familia Karimi MD  7/21/2024 12:23 AM EDT  Workstation ID: [id number]    CT CEREBRAL PERFUSION WITH & WITHOUT CONTRAST    Result Date: 7/21/2024  CT CEREBRAL PERFUSION W WO CONTRAST Date of Exam: 7/20/2024 11:06 PM EDT Indication: confusion.  Comparison: CT scan of the head dated 7/1/2022; MRI brain 7/2/2022 Technique: Axial CT images of the brain were obtained prior to and after the administration of 115 mL Isovue-370. Core blood volume, core blood flow, mean transit time, and Tmax images were obtained utilizing the Rapid software protocol. A limited CT angiogram of the head was also performed to measure the blood vessel density. The radiation dose reduction device was turned on for each scan per the ALARA (As Low as Reasonably Achievable) protocol. Findings: Cerebral blood flow, blood volume and mean transit time maps are symmetric without large perfusion defect. CBF<30% volume: 0 mL Tmax>6sec volume: 17 mL within the left parietal lobe in the distribution of the left middle cerebral artery. Mismatch volume: 17 mL Mismatch ratio: Infinite     Impression: 17 mL of ischemia in the left parietal lobe in the distribution of the left middle cerebral artery.  Electronically Signed: Familia Karimi MD  7/21/2024 12:06 AM EDT  Workstation ID: [id number]    XR Chest 1 View    Result Date: 7/20/2024  XR CHEST 1 VW Date of Exam: 7/20/2024 11:03 PM EDT Indication: confusion Comparison: Chest radiograph dated 5/5/2024 Findings: The heart is enlarged. There is tortuosity of the thoracic aorta. The lungs are clear.     Impression: Cardiomegaly. No active disease. Electronically Signed: Familia Karimi MD  7/20/2024 11:50 PM EDT  Workstation ID: [id number]    CT Head Without Contrast Stroke Protocol    Result Date: 7/20/2024  CT HEAD WO CONTRAST STROKE PROTOCOL Date of Exam: 7/20/2024 11:05 PM EDT Indication: confusion. Comparison: None available. Technique: Axial CT images were obtained of the head without contrast administration.  Reconstructed coronal images were also obtained. Automated exposure control and iterative construction methods were used. Scan Time: 2307 Results discussed with Rep. from the stroke team at 11:16 p.m. Findings: There is mild diffuse generalized atrophy. There are low-attenuation areas in the periventricular white matter consistent with chronic microvascular ischemic change. There is no mass, mass effect or midline shift. There are no abnormal extra-axial fluid collections or areas of acute hemorrhage. The paranasal sinuses are clear. The mastoid air cells are clear.     Impression: Atrophy and chronic microvascular ischemic change. No acute intracranial process. Electronically Signed: Familia Karimi MD  7/20/2024 11:16 PM EDT  Workstation ID: [id number]     Results for orders placed during the hospital encounter of 07/20/24    Adult Transthoracic Echo Complete W/ Cont if Necessary Per Protocol (With Agitated Saline)    Interpretation Summary    Left ventricular wall thickness is consistent with mild concentric hypertrophy.    Estimated right ventricular systolic pressure from tricuspid regurgitation is normal (<35 mmHg).    Mild dilation of the ascending aorta is  present.    Discharge Details        Discharge Medications        Changes to Medications        Instructions Start Date   apixaban 5 MG tablet tablet  Commonly known as: ELIQUIS  What changed:   medication strength  See the new instructions.   5 mg, Oral, Every 12 Hours Scheduled             Continue These Medications        Instructions Start Date   acetaminophen 325 MG tablet  Commonly known as: TYLENOL   650 mg, Oral, Every 4 Hours PRN      atorvastatin 80 MG tablet  Commonly known as: LIPITOR   80 mg, Oral, Nightly      b complex-vitamin c-folic acid 0.8 MG tablet tablet   1 tablet, Oral, Daily      EYE VITAMINS PO   Oral               Allergies   Allergen Reactions    Procaine Anaphylaxis    Codeine Other (See Comments)     Passed out    Latex Itching    Morphine Itching    Penicillins Itching    Sulfa Antibiotics Hives    Doxycycline Palpitations    Keflex [Cephalexin] Palpitations     Discharge Disposition:Home or Self Care    Diet:  Diet Instructions       Diet: Regular/House Diet; Regular (IDDSI 7); Thin (IDDSI 0)      Discharge Diet: Regular/House Diet    Texture: Regular (IDDSI 7)    Fluid Consistency: Thin (IDDSI 0)           Activity:  Activity Instructions       Activity as Tolerated            No future appointments.    Additional Instructions for the Follow-ups that You Need to Schedule       Discharge Follow-up with Specified Provider: Stroke Clinic in 1 month   As directed      To: Stroke Clinic in 1 month              Carol Orta PA-C  07/24/24    Time Spent on Discharge:  I spent 35 minutes on this discharge activity which included: face-to-face encounter with the patient, reviewing the data in the system, coordination of the care with the nursing staff as well as consultants, documentation, and entering orders.              Electronically signed by Carol Orta PA-C at 07/24/24 8100

## 2024-08-11 LAB
QT INTERVAL: 350 MS
QTC INTERVAL: 399 MS

## 2024-08-23 ENCOUNTER — OFFICE VISIT (OUTPATIENT)
Dept: NEUROLOGY | Facility: CLINIC | Age: 89
End: 2024-08-23
Payer: MEDICARE

## 2024-08-23 VITALS
BODY MASS INDEX: 31.05 KG/M2 | DIASTOLIC BLOOD PRESSURE: 88 MMHG | HEIGHT: 56 IN | TEMPERATURE: 98.5 F | WEIGHT: 138 LBS | OXYGEN SATURATION: 98 % | SYSTOLIC BLOOD PRESSURE: 146 MMHG | HEART RATE: 97 BPM

## 2024-08-23 DIAGNOSIS — E78.5 HYPERLIPIDEMIA, UNSPECIFIED HYPERLIPIDEMIA TYPE: ICD-10-CM

## 2024-08-23 DIAGNOSIS — Z86.73 HISTORY OF STROKE: Primary | ICD-10-CM

## 2024-08-23 DIAGNOSIS — I10 PRIMARY HYPERTENSION: ICD-10-CM

## 2024-08-23 DIAGNOSIS — I48.0 PAROXYSMAL ATRIAL FIBRILLATION: ICD-10-CM

## 2024-08-23 RX ORDER — PANTOPRAZOLE SODIUM 40 MG/1
40 TABLET, DELAYED RELEASE ORAL DAILY
COMMUNITY

## 2024-08-26 NOTE — PROGRESS NOTES
New Patient Office Visit      Encounter Date: 2024   Patient Name: Yolie Jacob  : 1935   MRN: [id number]   PCP: Provider, No Known    Referring Provider: Los Mata*     Chief Complaint:    Chief Complaint   Patient presents with    Follow-up    Stroke       History of Present Illness: Yolie Jacob is a 89 y.o. female with known medical diagnoses of atrial fibrillation (Eliquis), macular degeneration, arthritis, and Stroke ( R PCA territory infarct) who presented to ED on  with left facial drooping, confusion, balance disturbance, and speech difficulties. She was unsure if she had been taking her Eliquis at home and typically manages this medication herself. In the past, she had been noncompliant with Eliquis only taking the medication once a day. She was switched to Xarellto, but not compliant with it due to cost and thus switched back to Eliquis. Imiging revealed Acute stroke, left MCA territory, Left M3/M4 occlusion and a Supraclinoid 8mm stable ICA aneurysm. Notes from admission recomment outpatient neurosurgery evaluation of aneurysm, however she has been evaluated previously by Dr. Liam Amato concerning the aneurysm and it is currently stable and unchanged since . Given her age and comorbidity, he did not consider her a candidate for surgical intervention. She was discharged home to continue Eliquis 5 mg BID , Atorvastatin 80 mg nightly and follow in stroke clinic in one month. She was determined to be a likely candidate for a watchman device per cardiology.    Clinic Visit on 24: Patient was evaluated with her son at side. She continues to have some confusion and occasional balance disturbances from prior strokes. Her son now lives with her and manages all of her medication and diet. He does not drive and requires a friend to drive them to appointments. Per the son, patient has not missed any doses of Eliquis and is taking 5mg in the morning and at night.  Patient has stopped her statin medication and refuses to take this as it causes her legs to be sore. I discussed the elevation of her LDL and a risk factor for future strokes. As she refuses the statin, I discussed diet modifications with the son who is the one preparing her food.  Per our discussion, it does sound that the patient is eating the same meals as the son is fixing for himself, which often consists of hamburger or pizza at dinner. She does eat eggs, cereal, or oatmeal often for breakfast. As a way to lower LDL via diet, I recommended a Mediterranean diet high in fish, whole grains, fruits and vegetables. I recommended to avoid red meat, fried foods, fast food, and eggs. I believe this will be a challenge going forward as it will require more effort from the son to also change the meals he is making for himself or make separate meals for them both. I discussed the patients blood pressure management. She states that she does have a cardiologist, but I do not see a record of recent appointments. When asked about a primary provider, they informed me they are between providers and are looking into a provider from [facility] as driving to a PCP is troublesome. As she may be a candidate for a watchman and has no current provider treating HTN, I would recommend outpatient cardiology appointment. NIH 4 today as the patient was confused concerning her age, had ataxia in both finger to nose exams, and has some word finding aphasia. No additional complaints or concerns today    Stroke Risk Factors: atrial fibrillation, hyperlipidemia, and hypertension      Subjective      Past Medical History:   Past Medical History:   Diagnosis Date    A-fib     Arthritis     H/O cardioembolic CVA 2024    Hypertension     Stroke        Past Surgical History:   Past Surgical History:   Procedure Laterality Date    APPENDECTOMY      BREAST SURGERY      CATARACT EXTRACTION       SECTION      EYE SURGERY       "HEMORRHOIDECTOMY      INTRAOCULAR LENS INSERTION      TONSILLECTOMY         Family History: History reviewed. No pertinent family history.    Social History:   Social History     Socioeconomic History    Marital status:    Tobacco Use    Smoking status: Never     Passive exposure: Never    Smokeless tobacco: Never   Vaping Use    Vaping status: Never Used   Substance and Sexual Activity    Alcohol use: No    Drug use: No    Sexual activity: Defer       Medications:     Current Outpatient Medications:     acetaminophen (TYLENOL) 325 MG tablet, Take 2 tablets by mouth Every 4 (Four) Hours As Needed for Mild Pain ., Disp: , Rfl:     apixaban (ELIQUIS) 5 MG tablet tablet, Take 1 tablet by mouth Every 12 (Twelve) Hours. Indications: Atrial Fibrillation, Disp: , Rfl:     b complex-vitamin c-folic acid (NEPHRO-IRMA) 0.8 MG tablet tablet, Take 1 tablet by mouth Daily., Disp: , Rfl:     pantoprazole (PROTONIX) 40 MG EC tablet, Take 1 tablet by mouth Daily., Disp: , Rfl:     atorvastatin (LIPITOR) 80 MG tablet, Take 1 tablet by mouth Every Night. (Patient not taking: Reported on 8/23/2024), Disp: 90 tablet, Rfl:     Multiple Vitamins-Minerals (EYE VITAMINS PO), Take  by mouth. (Patient not taking: Reported on 8/23/2024), Disp: , Rfl:     Allergies:   Allergies   Allergen Reactions    Procaine Anaphylaxis    Codeine Other (See Comments)     Passed out    Latex Itching    Morphine Itching    Penicillins Itching    Sulfa Antibiotics Hives    Doxycycline Palpitations    Keflex [Cephalexin] Palpitations       Objective     Physical Exam:  Vital Signs:   Vitals:    08/23/24 1432   BP: 146/88   Pulse: 97   Temp: 98.5 °F (36.9 °C)   SpO2: 98%   Weight: 62.6 kg (138 lb)   Height: 142.2 cm (55.98\")     Body mass index is 30.96 kg/m².     Physical Exam  Constitutional:       General: She is awake. She is not in acute distress.     Appearance: She is not ill-appearing.   HENT:      Head: Normocephalic and atraumatic.   Eyes:      " General: Lids are normal.      Extraocular Movements: Extraocular movements intact.      Pupils: Pupils are equal, round, and reactive to light.   Cardiovascular:      Rate and Rhythm: Normal rate.   Pulmonary:      Effort: Pulmonary effort is normal. No respiratory distress.   Musculoskeletal:         General: No signs of injury.      Right lower leg: No edema.      Left lower leg: No edema.   Skin:     Findings: Bruising present.   Neurological:      Mental Status: She is alert. Mental status is at baseline. She is disoriented.      Cranial Nerves: No cranial nerve deficit.      Sensory: No sensory deficit.      Motor: No weakness.      Coordination: Coordination abnormal.   Psychiatric:         Mood and Affect: Mood normal.         Speech: Speech normal.       Neurological Exam  Mental Status  Awake and alert. Oriented only to person and place. Speech is normal. Expressive aphasia present.    Cranial Nerves  CN II: Visual fields full to confrontation.  CN III, IV, VI: Extraocular movements intact bilaterally. Normal lids and orbits bilaterally. Pupils equal round and reactive to light bilaterally.  CN V: Facial sensation is normal.  CN VII: Full and symmetric facial movement.  CN XII: Tongue midline without atrophy or fasciculations.    Motor  Normal muscle bulk throughout. No fasciculations present. Normal muscle tone.  At least 4/5 in all extremities .    Sensory  Light touch is normal in upper and lower extremities.     Coordination  Right: Finger-to-nose abnormality: Heel-to-shin normal.Left: Finger-to-nose abnormality: Heel-to-shin normal.  Ataxia coordination of finger to nose .    Gait  Casual gait: Narrow stance. Reduced stride length. Hesitant gait.  Patient slightly off balance when turning. 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 .       NIH Stroke Scale    1a  Level of consciousness: 0=alert; keenly responsive   1b. LOC  questions:  1=Answers one question correctly    1c. LOC commands: 0=Performs both tasks correctly   2.  Best Gaze: 0=normal   3. Visual: 0=No visual loss   4. Facial Palsy: 0=Normal symmetric movement   5a. Motor left arm: 0=No drift, limb holds 90 (or 45) degrees for full 10 seconds   5b.  Motor right arm: 0=No drift, limb holds 90 (or 45) degrees for full 10 seconds   6a. Motor left le=No drift, limb holds 90 (or 45) degrees for full 10 seconds   6b  Motor right le=No drift, limb holds 90 (or 45) degrees for full 10 seconds   7. Limb Ataxia: 2=Present in two limbs   8.  Sensory: 0=Normal; no sensory loss   9. Best Language:  1=Mild to moderate aphasia; some obvious loss of fluency or facility of comprehension without significant limitation on ideas expressed or form of expression.   10. Dysarthria: 0=Normal   11. Extinction and Inattention: 0=No abnormality    Total:   0         Modified Demetrius Score: 3        0  No Symptoms    1 No significant disability. Able to carry out all usual activities, despite some symptoms.    2 Slight disability. Able to look after own affairs without assistance, but unable to carry out all previous activities.    3 Moderate disability. Requires some help, but able to walk unassisted.    4 Moderately severe disability. Unable to attend to own bodily needs without assistance, and unable to walk unassisted.    5 Severe disability. Requires constant nursing care and attention, bedridden, incontinent.    6 Dead        PHQ-9 Depression Screening  Little interest or pleasure in doing things? 0-->not at all   Feeling down, depressed, or hopeless? 1-->several days   Trouble falling or staying asleep, or sleeping too much?     Feeling tired or having little energy?     Poor appetite or overeating?     Feeling bad about yourself - or that you are a failure or have let yourself or your family down?     Trouble concentrating on things, such as reading the newspaper or watching television?  "    Moving or speaking so slowly that other people could have noticed? Or the opposite - being so fidgety or restless that you have been moving around a lot more than usual?     Thoughts that you would be better off dead, or of hurting yourself in some way?     PHQ-9 Total Score 1   If you checked off any problems, how difficult have these problems made it for you to do your work, take care of things at home, or get along with other people?       STOP-Bang Score  Have you been diagnosed with Sleep Apnea?: no  Snoring?: yes  Tired?: no  Observed?: no  Pressure?: no  Stop Score: 1  Body Mass Index more than 35 kg/m2?: no  Age older than 50 year old?: yes  Neck large? \">17\"/43cm-M, >16\"/41cm-F: no  Gender=Male?: no  Total Stop-Bang Score: 2    Imaging Reviewed:   CT Angiogram Head/Neck     Result Date: 7/21/2024  Impression: 1.Stable 8 mm fusiform aneurysm of the supraclinoid left internal carotid artery. 2.Occlusion of the P2 segment of the right posterior cerebral artery. 3.Occlusion of the distal left M4 branch of the middle cerebral artery. 4.Intracranial atherosclerosis. Electronically Signed: Familia Karimi MD  7/21/2024 12:23 AM EDT  Workstation ID: [id number]     CT CEREBRAL PERFUSION WITH & WITHOUT CONTRAST     Result Date: 7/21/2024  Impression: 17 mL of ischemia in the left parietal lobe in the distribution of the left middle cerebral artery. Electronically Signed: Familia Karimi MD  7/21/2024 12:06 AM EDT  Workstation ID: [id number]     CT Head Without Contrast Stroke Protocol     Result Date: 7/20/2024  Impression: Atrophy and chronic microvascular ischemic change. No acute intracranial process. Electronically Signed: Familia Karimi MD  7/20/2024 11:16 PM EDT  Workstation ID: [id number]        MRI of head results from the last 21 days:[]Expand by Default   MRI Brain Without Contrast     Result Date: 7/22/2024  MRI BRAIN WO CONTRAST Date of Exam: 7/22/2024 4:57 AM EDT Indication: Stroke, follow up stroke.  Comparison: " CT scan the head dated 7/20/2024 Technique:  Routine multiplanar/multisequence sequence images of the brain were obtained without contrast administration. Findings: There is mild diffuse generalized atrophy. There are areas of increased T2 and FLAIR signal throughout the bilateral periventricular and subcortical white matter consistent with chronic microvascular ischemia. There is pathologic restricted diffusion in the posterior left parietal lobe in the distribution of the left middle cerebral artery. There is no evidence of an acute hemorrhage. There is no mass or mass effect. There are no abnormal extra-axial fluid collections.      Impression: Impression: Acute infarct in the distribution of the left middle cerebral artery. Electronically Signed: Familia Karimi MD  7/22/2024 6:29 AM EDT  Workstation ID: [id number]          Results for orders placed during the hospital encounter of 07/01/22   Adult Transthoracic Echo Complete W/ Cont if Necessary Per Protocol (With Agitated Saline)   Interpretation Summary  · Left ventricular wall thickness is consistent with mild concentric hypertrophy.  · Normal left ventricular systolic function, estimated EF 55%.  · Left ventricular diastolic function is consistent with (grade I) impaired relaxation.  · Aortic sclerosis with mild aortic insufficiency, no evidence of aortic stenosis.  · Mild mitral vegetation.  · Trace to mild tricuspid regurgitation with normal RVSP.    Laboratory Results:   A1c on 7/21: 5.10  LDL on 7/21: 156  H/H 15.6/46  AST/ALT 26/21      Assessment / Plan      Assessment/Plan:   Diagnoses and all orders for this visit:    1. History of stroke (Primary)  2. Paroxysmal atrial fibrillation  - L MCA stroke with distal M3 occlusion (2024)  - R PCA infarct (2022)  - history of noncompliance with Eliquis  - Continue Eliquis 5 mg BID (son is currently managing medications)  - modify risk factors of HTN, HLD as below  - follow up with cardiology to discuss watchman  device     3. Hyperlipidemia, unspecified hyperlipidemia type  - Patient refuses to take statin medication  - Last   - recommend low cholesterol diet wit modifications utilizing Mediterranean diet plan   - increase physical activity as tolerated  -Will continue to discuss statins in clinic future appts     4. Primary hypertension  - Patient on no current BP control medications  - No current Primary care monitoring   - Recommend PCP established care ASAP  - Follow up with cardiology to discuss HTN at same time as watchman device      Discussed the importance of medication compliance and lifestyle modifications (adequate blood pressure control, adequate control of hyperlipidemia, adequate glycemic control, increase physical activity, and healthy diet) to help reduce the risk of future cerebrovascular events.  Also discussed the signs symptoms that would warrant the patient return back to the emergency department including unilateral weakness, unilateral numbness, visual disturbances, loss of balance, speech difficulties, and/or a sudden severe headache.     Follow Up:   Follow up with neuro stroke in approx 3 months.     Sumit Christianson PA-C  [facility]

## 2024-10-04 ENCOUNTER — OFFICE VISIT (OUTPATIENT)
Dept: INTERNAL MEDICINE | Facility: CLINIC | Age: 89
End: 2024-10-04
Payer: MEDICARE

## 2024-10-04 ENCOUNTER — LAB (OUTPATIENT)
Dept: INTERNAL MEDICINE | Facility: CLINIC | Age: 89
End: 2024-10-04
Payer: MEDICARE

## 2024-10-04 VITALS
HEIGHT: 56 IN | SYSTOLIC BLOOD PRESSURE: 126 MMHG | HEART RATE: 76 BPM | DIASTOLIC BLOOD PRESSURE: 80 MMHG | BODY MASS INDEX: 29.25 KG/M2 | TEMPERATURE: 98.4 F | RESPIRATION RATE: 20 BRPM | OXYGEN SATURATION: 96 % | WEIGHT: 130 LBS

## 2024-10-04 DIAGNOSIS — Z86.73 H/O: CVA (CEREBROVASCULAR ACCIDENT): ICD-10-CM

## 2024-10-04 DIAGNOSIS — E78.2 MIXED HYPERLIPIDEMIA: ICD-10-CM

## 2024-10-04 DIAGNOSIS — Z00.00 PHYSICAL EXAM: Primary | ICD-10-CM

## 2024-10-04 DIAGNOSIS — Z11.59 NEED FOR HEPATITIS C SCREENING TEST: ICD-10-CM

## 2024-10-04 DIAGNOSIS — R47.01 EXPRESSIVE APHASIA: ICD-10-CM

## 2024-10-04 DIAGNOSIS — R25.2 MUSCLE CRAMPING: ICD-10-CM

## 2024-10-04 DIAGNOSIS — E55.9 VITAMIN D DEFICIENCY: ICD-10-CM

## 2024-10-04 DIAGNOSIS — Z91.148 H/O MEDICATION NONCOMPLIANCE: ICD-10-CM

## 2024-10-04 DIAGNOSIS — H35.30 MACULAR DEGENERATION OF BOTH EYES, UNSPECIFIED TYPE: ICD-10-CM

## 2024-10-04 DIAGNOSIS — Z09 ENCOUNTER FOR FOLLOW-UP EXAMINATION AFTER COMPLETED TREATMENT FOR CONDITIONS OTHER THAN MALIGNANT NEOPLASM: ICD-10-CM

## 2024-10-04 DIAGNOSIS — M85.80 OSTEOPENIA, UNSPECIFIED LOCATION: ICD-10-CM

## 2024-10-04 DIAGNOSIS — Z86.73 H/O ISCHEMIC LEFT MCA STROKE: ICD-10-CM

## 2024-10-04 DIAGNOSIS — Z00.00 ANNUAL PHYSICAL EXAM: Primary | ICD-10-CM

## 2024-10-04 PROBLEM — H54.8 LEGALLY BLIND: Status: ACTIVE | Noted: 2024-10-04

## 2024-10-04 LAB
25(OH)D3 SERPL-MCNC: 17.6 NG/ML (ref 30–100)
ALBUMIN SERPL-MCNC: 4 G/DL (ref 3.5–5.2)
ALBUMIN/GLOB SERPL: 1.8 G/DL
ALP SERPL-CCNC: 89 U/L (ref 39–117)
ALT SERPL W P-5'-P-CCNC: 17 U/L (ref 1–33)
ANION GAP SERPL CALCULATED.3IONS-SCNC: 13.6 MMOL/L (ref 5–15)
AST SERPL-CCNC: 22 U/L (ref 1–32)
BASOPHILS # BLD AUTO: 0.03 10*3/MM3 (ref 0–0.2)
BASOPHILS NFR BLD AUTO: 0.5 % (ref 0–1.5)
BILIRUB SERPL-MCNC: 0.4 MG/DL (ref 0–1.2)
BUN SERPL-MCNC: 12 MG/DL (ref 8–23)
BUN/CREAT SERPL: 16 (ref 7–25)
CALCIUM SPEC-SCNC: 9.8 MG/DL (ref 8.6–10.5)
CHLORIDE SERPL-SCNC: 107 MMOL/L (ref 98–107)
CHOLEST SERPL-MCNC: 251 MG/DL (ref 0–200)
CO2 SERPL-SCNC: 20.4 MMOL/L (ref 22–29)
CREAT SERPL-MCNC: 0.75 MG/DL (ref 0.57–1)
DEPRECATED RDW RBC AUTO: 45.1 FL (ref 37–54)
EGFRCR SERPLBLD CKD-EPI 2021: 76.2 ML/MIN/1.73
EOSINOPHIL # BLD AUTO: 0.12 10*3/MM3 (ref 0–0.4)
EOSINOPHIL NFR BLD AUTO: 2.2 % (ref 0.3–6.2)
ERYTHROCYTE [DISTWIDTH] IN BLOOD BY AUTOMATED COUNT: 13.8 % (ref 12.3–15.4)
GLOBULIN UR ELPH-MCNC: 2.2 GM/DL
GLUCOSE SERPL-MCNC: 106 MG/DL (ref 65–99)
HBA1C MFR BLD: 5.4 % (ref 4.8–5.6)
HCT VFR BLD AUTO: 44.3 % (ref 34–46.6)
HCV AB SER QL: NORMAL
HDLC SERPL-MCNC: 49 MG/DL (ref 40–60)
HGB BLD-MCNC: 14.6 G/DL (ref 12–15.9)
IMM GRANULOCYTES # BLD AUTO: 0.01 10*3/MM3 (ref 0–0.05)
IMM GRANULOCYTES NFR BLD AUTO: 0.2 % (ref 0–0.5)
LDLC SERPL CALC-MCNC: 178 MG/DL (ref 0–100)
LDLC/HDLC SERPL: 3.58 {RATIO}
LYMPHOCYTES # BLD AUTO: 0.6 10*3/MM3 (ref 0.7–3.1)
LYMPHOCYTES NFR BLD AUTO: 10.8 % (ref 19.6–45.3)
MCH RBC QN AUTO: 29.5 PG (ref 26.6–33)
MCHC RBC AUTO-ENTMCNC: 33 G/DL (ref 31.5–35.7)
MCV RBC AUTO: 89.5 FL (ref 79–97)
MONOCYTES # BLD AUTO: 0.44 10*3/MM3 (ref 0.1–0.9)
MONOCYTES NFR BLD AUTO: 7.9 % (ref 5–12)
NEUTROPHILS NFR BLD AUTO: 4.37 10*3/MM3 (ref 1.7–7)
NEUTROPHILS NFR BLD AUTO: 78.4 % (ref 42.7–76)
NRBC BLD AUTO-RTO: 0 /100 WBC (ref 0–0.2)
PLATELET # BLD AUTO: 245 10*3/MM3 (ref 140–450)
PMV BLD AUTO: 11 FL (ref 6–12)
POTASSIUM SERPL-SCNC: 3.8 MMOL/L (ref 3.5–5.2)
PROT SERPL-MCNC: 6.2 G/DL (ref 6–8.5)
RBC # BLD AUTO: 4.95 10*6/MM3 (ref 3.77–5.28)
SODIUM SERPL-SCNC: 141 MMOL/L (ref 136–145)
TRIGL SERPL-MCNC: 134 MG/DL (ref 0–150)
TSH SERPL DL<=0.05 MIU/L-ACNC: 2.7 UIU/ML (ref 0.27–4.2)
VIT B12 BLD-MCNC: 317 PG/ML (ref 211–946)
VLDLC SERPL-MCNC: 24 MG/DL (ref 5–40)
WBC NRBC COR # BLD AUTO: 5.57 10*3/MM3 (ref 3.4–10.8)

## 2024-10-04 PROCEDURE — 85025 COMPLETE CBC W/AUTO DIFF WBC: CPT | Performed by: INTERNAL MEDICINE

## 2024-10-04 PROCEDURE — 82306 VITAMIN D 25 HYDROXY: CPT | Performed by: INTERNAL MEDICINE

## 2024-10-04 PROCEDURE — 80053 COMPREHEN METABOLIC PANEL: CPT | Performed by: INTERNAL MEDICINE

## 2024-10-04 PROCEDURE — 86803 HEPATITIS C AB TEST: CPT | Performed by: INTERNAL MEDICINE

## 2024-10-04 PROCEDURE — 82607 VITAMIN B-12: CPT | Performed by: INTERNAL MEDICINE

## 2024-10-04 PROCEDURE — 36415 COLL VENOUS BLD VENIPUNCTURE: CPT | Performed by: INTERNAL MEDICINE

## 2024-10-04 PROCEDURE — 81001 URINALYSIS AUTO W/SCOPE: CPT | Performed by: INTERNAL MEDICINE

## 2024-10-04 PROCEDURE — 84443 ASSAY THYROID STIM HORMONE: CPT | Performed by: INTERNAL MEDICINE

## 2024-10-04 PROCEDURE — 80061 LIPID PANEL: CPT | Performed by: INTERNAL MEDICINE

## 2024-10-04 PROCEDURE — 83036 HEMOGLOBIN GLYCOSYLATED A1C: CPT | Performed by: INTERNAL MEDICINE

## 2024-10-04 RX ORDER — PRAVASTATIN SODIUM 40 MG
40 TABLET ORAL DAILY
Qty: 90 TABLET | Refills: 3 | Status: SHIPPED | OUTPATIENT
Start: 2024-10-04

## 2024-10-04 NOTE — PROGRESS NOTES
Female Physical Note      Date: 10/04/2024   Patient Name: Yolie Jacob  : 1935   MRN: [id number]     Chief Complaint:    Chief Complaint   Patient presents with    [facility] Care    Cerebrovascular Accident       History of Present Illness: Yolie Jacob is a 89 y.o. female who is here today for their annual health maintenance and physical.  Patient is a 89-year-old female with past medical history significant for prior cardioembolic right PCA CVA and paroxysmal atrial fibrillation.  Reports are that patient has previously been noncompliant with Eliquis.  She was seen on 2024 at [facility] ER with new left facial droop and expressive aphasia.  She was found to have left MCA CVA, supraclinoid ICA aneurysm.  Patient had resolution and right hemineglect and facial droop, however speech difficulties persisted.  She was eventually discharged to [facility] rehab.    Still has some difficulty with expressive aphasia when upset or speaks fast.       Subjective      Review of Systems     I have reviewed the following portions of the patient's history and these were updated and discussed with the patient as appropriate: past family history, past medical history, past social history, past surgical history, and problem list.     Medications:     Current Outpatient Medications:     acetaminophen (TYLENOL) 325 MG tablet, Take 2 tablets by mouth Every 4 (Four) Hours As Needed for Mild Pain ., Disp: , Rfl:     apixaban (ELIQUIS) 5 MG tablet tablet, Take 1 tablet by mouth Every 12 (Twelve) Hours. Indications: Atrial Fibrillation, Disp: , Rfl:     b complex-vitamin c-folic acid (NEPHRO-IRMA) 0.8 MG tablet tablet, Take 1 tablet by mouth Daily., Disp: , Rfl:     Multiple Vitamins-Minerals (Eye Vitamins) capsule, Take 1 each by mouth Daily., Disp: 90 each, Rfl: 3    co-enzyme Q-10 30 MG capsule, Take 1 capsule by mouth 3 (Three) Times a Day., Disp: 90 capsule, Rfl: 11    pravastatin  (Pravachol) 40 MG tablet, Take 1 tablet by mouth Daily., Disp: 90 tablet, Rfl: 3    Allergies:   Allergies   Allergen Reactions    Procaine Anaphylaxis    Codeine Other (See Comments)     Passed out    Latex Itching    Morphine Itching    Penicillins Itching    Sulfa Antibiotics Hives    Doxycycline Palpitations    Keflex [Cephalexin] Palpitations       Immunizations:  Immunization History   Administered Date(s) Administered    31-influenza Vac Quardvalent Preservativ 09/14/2015    Fluzone High-Dose 65+YRS 09/11/2014, 09/16/2016    Pneumococcal Conjugate 13-Valent (PCV13) 09/14/2015    Pneumococcal Polysaccharide (PPSV23) 09/07/2012    TD Preservative Free (Tenivac) 08/21/2019      Colorectal Screening:     Last Completed Colonoscopy       This patient has no relevant Health Maintenance data.           Pap:    Last Completed Pap Smear       This patient has no relevant Health Maintenance data.           Mammogram:    Last Completed Mammogram       This patient has no relevant Health Maintenance data.            Diet/Physical activity:healthy    Sexual Health: NA  Last menstrual period: NA     Breast feeding: Breastfeeding Status:  NA          PHQ-9 Depression Screening  PHQ-9 Total Score:              Intimate partner violence: (Screen on initial visit, pregnant women, women with injuries, older adult with injury or evidence of neglect):  Violence can be a problem in many people's lives, so I now ask every patient about trauma or abuse they may have experienced in a relationship.  Stress/Safety - Do you feel safe in your relationship? Yes  Afraid/Abused - Have you ever been in a relationship where you were threatened, hurt, or afraid? No  Friend/Family - If yes, are your friends aware you have been hurt?  Emergency Plan - Do you have a safe place to go and the resources you need in an emergency? Yes    Osteoporosis: NA      Objective     Physical Exam:  Vital Signs:   Vitals:    10/04/24 1505   BP: 126/80   BP  "Location: Left arm   Patient Position: Sitting   Cuff Size: Adult   Pulse: 76   Resp: 20   Temp: 98.4 °F (36.9 °C)   TempSrc: Tympanic   SpO2: 96%   Weight: 59 kg (130 lb)   Height: 142.2 cm (56\")       Physical Exam  Constitutional:       General: She is not in acute distress.     Appearance: Normal appearance. She is not ill-appearing.   HENT:      Right Ear: Tympanic membrane normal.      Left Ear: Tympanic membrane normal.      Nose: No congestion or rhinorrhea.      Mouth/Throat:      Mouth: Mucous membranes are moist.      Pharynx: No oropharyngeal exudate.   Eyes:      Extraocular Movements: Extraocular movements intact.      Conjunctiva/sclera: Conjunctivae normal.      Pupils: Pupils are equal, round, and reactive to light.   Cardiovascular:      Rate and Rhythm: Normal rate and regular rhythm.   Pulmonary:      Effort: Pulmonary effort is normal. No respiratory distress.      Breath sounds: Normal breath sounds.   Abdominal:      General: Abdomen is flat. Bowel sounds are normal.      Palpations: Abdomen is soft.      Tenderness: There is no abdominal tenderness.   Musculoskeletal:         General: No swelling.      Cervical back: Neck supple.      Right lower leg: No edema.      Left lower leg: No edema.   Skin:     Coloration: Skin is not jaundiced.      Findings: No rash.   Neurological:      General: No focal deficit present.      Mental Status: She is alert and oriented to person, place, and time.      Cranial Nerves: No cranial nerve deficit.   Psychiatric:         Mood and Affect: Mood normal.         Behavior: Behavior normal.         Thought Content: Thought content normal.         Procedures    Assessment / Plan      Assessment/Plan:   Diagnoses and all orders for this visit:    1. Annual physical exam (Primary)  -     Hemoglobin A1c  -     Vitamin B12  -     Lipid Panel  -     Comprehensive Metabolic Panel  -     CBC & Differential  -     Urinalysis With Microscopic - Urine, Clean Catch  -     " TSH Rfx On Abnormal To Free T4    2. H/O cardioembolic CVA    3. Mixed hyperlipidemia  -     pravastatin (Pravachol) 40 MG tablet; Take 1 tablet by mouth Daily.  Dispense: 90 tablet; Refill: 3    4. H/O ischemic left MCA stroke    5. Expressive aphasia    6. Vitamin D deficiency  -     Vitamin D,25-Hydroxy    7. Need for hepatitis C screening test  -     Hepatitis C Antibody    8. Macular degeneration of both eyes, unspecified type  -     Multiple Vitamins-Minerals (Eye Vitamins) capsule; Take 1 each by mouth Daily.  Dispense: 90 each; Refill: 3  -     Cancel: Ambulatory Referral to Ophthalmology  -     Ambulatory Referral to Ophthalmology    9. H/O medication noncompliance  -     Ambulatory Referral to Case Management Medication Adherence    10. Muscle cramping  -     co-enzyme Q-10 30 MG capsule; Take 1 capsule by mouth 3 (Three) Times a Day.  Dispense: 90 capsule; Refill: 11    11. Osteopenia, unspecified location  -     DEXA Bone Density Axial; Future    12. Encounter for follow-up examination after completed treatment for conditions other than malignant neoplasm  -     DEXA Bone Density Axial; Future         Has high cholesterol . Has cramping with atorvastatin.  Stopped taking this. Discussed risks of stroke.      Thinks she is on 2.5mg bid of eliquis, issues with noncompliance. Her son is responsible for giving and often forgets night time dose. She gets samples from her cardiologist. May have better compliance with xarelto?  Follows with Dr. Moreno.       BMI is >= 30 and <35. (Class 1 Obesity). The following options were offered after discussion;: weight loss educational material (shared in after visit summary)       Follow Up:   Return in about 3 months (around 1/4/2025) for Medicare Wellness.    Healthcare Maintenance:   Counseling provided on exercise, nutrition, age-appropriate screening test, and appropriate lab studies.   Yolie Jacob voices understanding and acceptance of this advice and will call  back with any further questions or concerns. AVS with preventive healthcare tips printed for patient.     Reviewed the following with the patient: [employer] educational material given to patient.          DO MARISOL Juarez

## 2024-10-05 LAB
BACTERIA UR QL AUTO: NORMAL /HPF
BILIRUB UR QL STRIP: NEGATIVE
CLARITY UR: CLEAR
COLOR UR: YELLOW
GLUCOSE UR STRIP-MCNC: NEGATIVE MG/DL
HGB UR QL STRIP.AUTO: NEGATIVE
HYALINE CASTS UR QL AUTO: NORMAL /LPF
KETONES UR QL STRIP: NEGATIVE
LEUKOCYTE ESTERASE UR QL STRIP.AUTO: ABNORMAL
NITRITE UR QL STRIP: NEGATIVE
PH UR STRIP.AUTO: 6 [PH] (ref 5–8)
PROT UR QL STRIP: NEGATIVE
RBC # UR STRIP: NORMAL /HPF
REF LAB TEST METHOD: NORMAL
SP GR UR STRIP: 1.01 (ref 1–1.03)
SQUAMOUS #/AREA URNS HPF: NORMAL /HPF
UROBILINOGEN UR QL STRIP: ABNORMAL
WBC # UR STRIP: NORMAL /HPF

## 2024-10-07 ENCOUNTER — REFERRAL TRIAGE (OUTPATIENT)
Dept: CASE MANAGEMENT | Facility: OTHER | Age: 89
End: 2024-10-07
Payer: MEDICARE

## 2024-10-07 ENCOUNTER — PATIENT OUTREACH (OUTPATIENT)
Dept: CASE MANAGEMENT | Facility: OTHER | Age: 89
End: 2024-10-07
Payer: MEDICARE

## 2024-10-07 DIAGNOSIS — M85.80 OSTEOPENIA, UNSPECIFIED LOCATION: Primary | ICD-10-CM

## 2024-10-07 DIAGNOSIS — E55.9 VITAMIN D DEFICIENCY: ICD-10-CM

## 2024-10-07 RX ORDER — CHOLECALCIFEROL (VITAMIN D3) 50 MCG
2000 TABLET ORAL DAILY
Qty: 90 TABLET | Refills: 5 | Status: SHIPPED | OUTPATIENT
Start: 2024-10-07

## 2024-10-07 NOTE — OUTREACH NOTE
AMBULATORY CASE MANAGEMENT NOTE    Names and Relationships of Patient/Support Persons: Contact: Yolie Jacob; Relationship: Self -     Patient Outreach    Spoke with patient and her son as a follow up call to a provider referral. Son stated his mom does not sleep well and sometimes she sleeps in until the afternoon and misses her dose of Eliquis. Encouraged patient and son to set reminders on their phones for medications.     Patient reports she goes to bed but does not fall asleep easily because she worries about things that have to be taken care of. Her 25 y.o. adopted son is taking care of her and their real estate business. Patient does not want to take medication for sleep and does not want to see behavioral health for stress/sleep.     Discussed need for cardiology follow up and encouraged patient to call cardiology today for an appointment. Patient voiced understanding. Provided patient with the contact information for the [facility]. ACM will follow up next week.     Adult Patient Profile  Questions/Answers      Flowsheet Row Most Recent Value   How to be Addressed Yolie   How Well Do You Speak English? very well   Source of Information patient, family   Patient Aware of Diagnosis yes   Hearing Difficulty or Deaf yes   Wear Glasses or Blind yes   Vision Management Legally blind   Concentrating, Remembering or Making Decisions Difficulty no   Difficulty Communicating no   Difficulty Eating/Swallowing no   Walking or Climbing Stairs Difficulty no   Dressing/Bathing Difficulty no   Doing Errands Independently Difficulty (such as shopping) no   Equipment Currently Used at Home walker, rolling   Change in Functional Status Since Onset of Current Illness/Injury no   Fall Risk Category High   Primary Source of Support/Comfort child(amy)   People in Home child(amy), adult   Family Caregiver if Needed child(amy), adult   Primary Roles/Responsibilities disabled   Current Living Arrangements  home        SDOH updated and reviewed with the patient during this program:  Financial Resource Strain: Low Risk  (10/7/2024)    Overall Financial Resource Strain (CARDIA)     Difficulty of Paying Living Expenses: Not very hard      --     Food Insecurity: No Food Insecurity (10/7/2024)    Hunger Vital Sign     Worried About Running Out of Food in the Last Year: Never true     Ran Out of Food in the Last Year: Never true   Recent Concern: Food Insecurity - Food Insecurity Present (7/22/2024)    Hunger Vital Sign     Worried About Running Out of Food in the Last Year: Sometimes true     Ran Out of Food in the Last Year: Sometimes true      --     Transportation Needs: No Transportation Needs (10/7/2024)    PRAPARE - Transportation     Lack of Transportation (Medical): No     Lack of Transportation (Non-Medical): No         Neeta BASSETT  Ambulatory Case Management    10/7/2024, 13:53 EDT

## 2024-10-09 ENCOUNTER — PATIENT ROUNDING (BHMG ONLY) (OUTPATIENT)
Dept: INTERNAL MEDICINE | Facility: CLINIC | Age: 89
End: 2024-10-09
Payer: MEDICARE

## 2024-10-09 NOTE — PROGRESS NOTES
A  my chart message has been sent to the patient for patient rounding with [facility].

## 2024-10-16 ENCOUNTER — PATIENT OUTREACH (OUTPATIENT)
Dept: CASE MANAGEMENT | Facility: OTHER | Age: 89
End: 2024-10-16
Payer: MEDICARE

## 2024-10-16 NOTE — OUTREACH NOTE
"AMBULATORY CASE MANAGEMENT NOTE    Names and Relationships of Patient/Support Persons: Contact: Yolie Jacob; Relationship: Self -     Patient Outreach    Spoke with patient and her son as a follow up call. They report due to tax season, they have not scheduled a cardiology appointment and have not reached out to the [facility]. Encouraged them to schedule a cardiology appointment soon.     Son states patient is not sleeping, reports she is running on \"coffee and a prayer\". Patient adamantly declines medication for sleep. Encouraged her to consider a mental health referral to deal with stress. Patient stated once she gets through tax season that will relieve stress and she will be better. ACM will follow up in a couple weeks.       Neeta BASSETT  Ambulatory Case Management    10/16/2024, 10:43 EDT  "

## 2024-11-08 ENCOUNTER — PATIENT OUTREACH (OUTPATIENT)
Dept: CASE MANAGEMENT | Facility: OTHER | Age: 89
End: 2024-11-08
Payer: MEDICARE

## 2024-11-08 NOTE — OUTREACH NOTE
AMBULATORY CASE MANAGEMENT NOTE    Names and Relationships of Patient/Support Persons: Contact: KERI PERERA; Relationship: Emergency Contact -     Patient Outreach    Spoke briefly with patient's son. He stated patient has followed up with Cardiology and needs to return a heart monitor soon. They were checking out of the tire center and had to end the call.       Neeta BASSETT  Ambulatory Case Management    11/8/2024, 15:46 EST

## 2024-11-18 ENCOUNTER — PATIENT OUTREACH (OUTPATIENT)
Dept: CASE MANAGEMENT | Facility: OTHER | Age: 89
End: 2024-11-18
Payer: MEDICARE

## 2024-11-18 NOTE — OUTREACH NOTE
AMBULATORY CASE MANAGEMENT NOTE    Names and Relationships of Patient/Support Persons: Contact: Yolie Jacob; Relationship: Self -     Care Coordination    Spoke with patient and her son. Patient stated her son is 18 y.o., son stated he is 25 y.o. Patient was able to recall her birthday but had some difficulty. She reports she is blind and has difficulties with ADLs. She reports some financial difficulty and has a debt that she sold some property to pay for. She stated she has had some memory problems and does not feel competent to make financial and legal decisions. Patient reports she sold the property for the wrong amount which was not enough to cover the debt. They will close on the sale this Friday.     Patient and son state that the son has high functioning ADHD and autism. He has challenges managing the care of his mom. He is unable to work because he has to care for his mom. Discussed speaking with APS for resources, patient is agreeable. Report called, ID: [id number].    Neeta BASSETT  Ambulatory Case Management    11/18/2024, 11:39 EST

## 2024-11-20 ENCOUNTER — TELEPHONE (OUTPATIENT)
Dept: CASE MANAGEMENT | Facility: OTHER | Age: 89
End: 2024-11-20
Payer: MEDICARE

## 2024-11-20 ENCOUNTER — OFFICE VISIT (OUTPATIENT)
Dept: NEUROLOGY | Facility: CLINIC | Age: 89
End: 2024-11-20
Payer: MEDICARE

## 2024-11-20 VITALS
SYSTOLIC BLOOD PRESSURE: 120 MMHG | BODY MASS INDEX: 29.25 KG/M2 | OXYGEN SATURATION: 97 % | DIASTOLIC BLOOD PRESSURE: 72 MMHG | HEART RATE: 97 BPM | WEIGHT: 130 LBS | HEIGHT: 56 IN | TEMPERATURE: 98.2 F

## 2024-11-20 DIAGNOSIS — R41.89 COGNITIVE CHANGE: Primary | ICD-10-CM

## 2024-11-20 DIAGNOSIS — R41.3 MEMORY LOSS: ICD-10-CM

## 2024-11-20 PROCEDURE — 1160F RVW MEDS BY RX/DR IN RCRD: CPT | Performed by: NURSE PRACTITIONER

## 2024-11-20 PROCEDURE — 99214 OFFICE O/P EST MOD 30 MIN: CPT | Performed by: NURSE PRACTITIONER

## 2024-11-20 PROCEDURE — 1159F MED LIST DOCD IN RCRD: CPT | Performed by: NURSE PRACTITIONER

## 2024-11-20 RX ORDER — EZETIMIBE 10 MG/1
10 TABLET ORAL DAILY
Qty: 30 TABLET | Refills: 11 | Status: SHIPPED | OUTPATIENT
Start: 2024-11-20 | End: 2025-11-20

## 2024-11-20 NOTE — PATIENT INSTRUCTIONS
-Continue 5 mg Eliquis    -Start 10mg Zetia .    -Do not take aspirin as an analgesic/pain relief while taking a daily low-dose aspirin for primary stroke prevention.    -Consider taking Tylenol, if appropriate.    -Consider contacting your primary care physician regarding pain.  -chair yoga exercise 30 minutes 3 - 4 times a day  -Heart and Brain Healthy diet, Consider a Mediterranean Diet.   -Continue tight control of blood sugars  -Journal BP at home and call PCP with persistent BP >140/90  -Follow up in Stroke: 6 months  -Neuropsychologist for memory changes

## 2024-11-20 NOTE — PROGRESS NOTES
Stroke Clinic Office Visit     Encounter Date: 2024   Patient Name: Yolei Jacob  : 1935  MRN: [id number]   PCP: Cabrera Salmeron DO  Referring Provider: No ref. provider found     Chief Complaint Follow-up and Stroke    History of Present Illness  Yolie Jacob is a 89 y.o. right  handed female here for 6 month stroke clinic follow up.    The patient was found to have a left MC territory ischemic stroke, Left M3 [id number] and a stable supraclinoid 8mm stable ICA aneurysm 2024.   Etiology of stroke, likely cardioembolic.    The patient presented to [facility] ED with left facial droop, confusion, balance disturbance, and dysarthria.    Past medical history is significant for atrial fibrillation (Eliquis), HTN, HLD, macular degeneration, arthritis, and Stroke ( R PCA territory infarct).  The patient was noncompliant with taking Eliquis, only taking one tab a day, due to cost, She switched to Xarelto, which was more expensive.  The patient was then switched back to Eliquis.         80 mg of atorvastatin was started for secondary stroke prevention.    Further workup included: 2Decho indicated a negative bubble study with EF 60.6%.       The patient arrives today with her son. She is wearing sunglasses on a cloudy day 2/2 photophobia, which she associates to her history of macular degeneration.  The patient states, she is taking Eliquis twice daily.  he is experiencing anxiety related to a property sale, which she believes was undervalued. This property was her primary source of income, and she is now concerned about her financial stability. She is questioning her mental competence in handling such transactions. We discussed consultation with neuropsych for a cognitive evaluation and she is agreeable.  She has been advised to take cholesterol medication due to persistently high cholesterol levels. However, she experiences severe cramps when on these medications, leading to her reluctance to continue  them. She is currently on Medicare and Blue Cross and Blue Shield supplement, but lacks D coverage. She has been attempting to manage her cholesterol through diet, specifically a Mediterranean diet, which includes salads, dry beans, and tuna. Patient denies any other neurological symptoms, including: headache, vision changes, dysesthesias, loss of consciousness, seizure or new areas of motor weakness.           Subjective     Past Medical History:   Diagnosis Date    A-fib     Arthritis     Clotting disorder     H/O cardioembolic CVA 2024    Heart murmur     Hypertension     Kidney stone     Osteopenia     Pancreatitis     Stroke       Past Surgical History:   Procedure Laterality Date    APPENDECTOMY      BREAST SURGERY      CATARACT EXTRACTION       SECTION      EYE SURGERY      HEMORRHOIDECTOMY      INTRAOCULAR LENS INSERTION      TONSILLECTOMY       Family History   Problem Relation Age of Onset    Cancer Mother     Osteoporosis Mother     Heart attack Father     Hypertension Sister     Osteoporosis Sister     Stroke Sister       Social History     Socioeconomic History    Marital status:    Tobacco Use    Smoking status: Never     Passive exposure: Never    Smokeless tobacco: Never   Vaping Use    Vaping status: Never Used   Substance and Sexual Activity    Alcohol use: No    Drug use: No    Sexual activity: Defer       Current Outpatient Medications:     acetaminophen (TYLENOL) 325 MG tablet, Take 2 tablets by mouth Every 4 (Four) Hours As Needed for Mild Pain ., Disp: , Rfl:     apixaban (ELIQUIS) 5 MG tablet tablet, Take 1 tablet by mouth Every 12 (Twelve) Hours. Indications: Atrial Fibrillation, Disp: , Rfl:     b complex-vitamin c-folic acid (NEPHRO-IRMA) 0.8 MG tablet tablet, Take 1 tablet by mouth Daily., Disp: , Rfl:     Cholecalciferol (Vitamin D) 50 MCG ( UT) tablet, Take 1 tablet by mouth Daily., Disp: 90 tablet, Rfl: 5    co-enzyme Q-10 30 MG capsule, Take 1 capsule by mouth  "3 (Three) Times a Day., Disp: 90 capsule, Rfl: 11    ezetimibe (Zetia) 10 MG tablet, Take 1 tablet by mouth Daily., Disp: 30 tablet, Rfl: 11    Multiple Vitamins-Minerals (Eye Vitamins) capsule, Take 1 each by mouth Daily. (Patient not taking: Reported on 11/20/2024), Disp: 90 each, Rfl: 3    pravastatin (Pravachol) 40 MG tablet, Take 1 tablet by mouth Daily. (Patient not taking: Reported on 11/20/2024), Disp: 90 tablet, Rfl: 3   Allergies   Allergen Reactions    Procaine Anaphylaxis    Codeine Other (See Comments)     Passed out    Latex Itching    Morphine Itching    Penicillins Itching    Sulfa Antibiotics Hives    Doxycycline Palpitations    Keflex [Cephalexin] Palpitations        Objective     Physical Exam:  Vitals:    11/20/24 1452   BP: 120/72   Pulse: 97   Temp: 98.2 °F (36.8 °C)   SpO2: 97%   Weight: 59 kg (130 lb)   Height: 142.2 cm (55.98\")      Body mass index is 29.16 kg/m².     Physical Exam:  General Appearance: Alert  Eyes: Anicteric sclera  HEENT: no scleral injection   Lungs: respirations appear comfortable, no obvious increased work of breathing  Extremities: No cyanosis or fingernail clubbing   Skin: No rashes in exposed skin areas     Neurological Examination:   Mental status: Alert and oriented to person, place, and time. Speech with no dysarthria, able to name and repeat with no difficulty.    Cranial Nerves: Visual fields intact. Extraocular movements are intact with no nystagmus. Facial sensation intact. Face symmetrical. Hearing grossly intact. Palate movement is symmetric. Full shoulder shrug bilaterally. Tongue protrudes midline.   Sensory: Sensory exam to light touch in all four extremities distally is normal. Double simultaneous sensory stimulation shows no extinction  Motor: Normal tone throughout. Normal bulk. Pronator drift is absent.  Left upper extremity: 5/5 deltoid, tricep, bicep, interosseous, hand .   Right upper extremity: 5/5 deltoid, tricep, bicep, interosseous, hand " .   Left lower extremity: 5/5 iliopsoas, knee extension/flexion, foot dorsi/plantarflexion.  Right lower extremity: 5/5 iliopsoas, knee extension/flexion, foot dorsi/plantarflexion.  Cerebellar: Finger-to-nose intact. Heel-to-shin intact. Rapid alternating movements are intact.   Gait: Normal.         PHQ-9 Depression Screening  Little interest or pleasure in doing things? Not at all   Feeling down, depressed, or hopeless? Several days   PHQ-2 Total Score 1   Trouble falling or staying asleep, or sleeping too much?     Feeling tired or having little energy?     Poor appetite or overeating?     Feeling bad about yourself - or that you are a failure or have let yourself or your family down?     Trouble concentrating on things, such as reading the newspaper or watching television?     Moving or speaking so slowly that other people could have noticed? Or the opposite - being so fidgety or restless that you have been moving around a lot more than usual?     Thoughts that you would be better off dead, or of hurting yourself in some way?     PHQ-9 Total Score     If you checked off any problems, how difficult have these problems made it for you to do your work, take care of things at home, or get along with other people?          ALFONZO Fall Risk Clinician Key Questions   Have you fallen in the past year?: Yes  Stay Idependant Patient Questions   Patient Fall Risk Assessment Score : 0  Fall Risk Category  Fall Risk Category: Moderate    Laboratory Results:   Hemoglobin   Date Value Ref Range Status   10/04/2024 14.6 12.0 - 15.9 g/dL Final     Hematocrit   Date Value Ref Range Status   10/04/2024 44.3 34.0 - 46.6 % Final     Platelets   Date Value Ref Range Status   10/04/2024 245 140 - 450 10*3/mm3 Final     Hemoglobin A1C   Date Value Ref Range Status   10/04/2024 5.40 4.80 - 5.60 % Final     LDL Cholesterol    Date Value Ref Range Status   10/04/2024 178 (H) 0 - 100 mg/dL Final     AST (SGOT)   Date Value Ref Range  Status   10/04/2024 22 1 - 32 U/L Final     ALT (SGPT)   Date Value Ref Range Status   10/04/2024 17 1 - 33 U/L Final           Assessment / Plan    In summary, this is an 88 y/o woman with a LMCA territory ischemic stroke 7/2024.  Etiology, likely cardioembolic in the setting of afib and noncompliance with taking Eliquis.    She presented to [facility] ED with left facial droop, confusion, balance disturbance, and dysarthria, which slowly improved during her hospitalization and rehab.    PMH: [place],  atrial fibrillation (Eliquis), HTN, HLD, macular degeneration, arthritis, and Stroke (2022 R PCA territory infarct).  LDL remains unmanaged following a trial of exercise and diet.   Neuro exam is nonfocal.   We talked at great length the risks and benefits of starting Zetia and the patient agrees to a trial.    In light of there memory difficulties and request to assess for competency, recommendation to consult neuropsych for further evaluation.    Reportedly, the patient's financial situation with improve after the sale of property.  Patient encourged to utilize [facility] for prescription discounts and coupons.    Will revist WATCHMAN at next appointment.  Assessment and Plan    Diagnoses and all orders for this visit:    1. Cognitive change (Primary)  -     Ambulatory Referral to Neuropsychology    2. Memory loss  -     Ambulatory Referral to Neuropsychology    3.Hyperlipidemia  Other orders  -     ezetimibe (Zetia) 10 MG tablet; Take 1 tablet by mouth Daily.  Dispense: 30 tablet; Refill: 11  4. History of stroke  Continue 5 mg Eliquis    -Start 10mg Zetia .    -Do not take aspirin as an analgesic/pain relief while taking a daily low-dose aspirin for primary stroke prevention.    -Consider taking Tylenol, if appropriate.    -Consider contacting your primary care physician regarding pain.  -chair yoga exercise 30 minutes 3 - 4 times a day  -Heart and Brain Healthy diet, Consider a Mediterranean Diet.   -Follow up in Stroke: 6  "months  -Journal BP at home and call PCP with persistent BP >130/80  -The patient was advised to follow up with her primary care physician for ongoing management and screening for hypertension, hypercholesterolemia, and diabetes.   -The patient was counseled on long-term blood pressure goal less than 120/80, long-term LDL goal less than 70, and long-term hemoglobin A1c goal less than 7.0% for stroke prevention. This was also printed for the patient in the after visit summary.  -The patient was counseled she experiences symptoms of acute onset unilateral arm, leg, or face weakness/numbness/tingling, unilateral facial droop, slurred speech/word finding difficulty, visual disturbance (\"curtain falling\" or visual field loss), or severe headache she should call 911 and present to the nearest emergency department immediately.    I spent 30 minutes caring for Yolie on this date of service. This time includes time spent by me in the following activities:reviewing tests, performing a medically appropriate examination and/or evaluation , counseling and educating the patient/family/caregiver, ordering medications, tests, or procedures, and documenting information in the medical record    Follow Up  No follow-ups on file.    Patient or patient representative verbalized consent for the use of Ambient Listening during the visit with  MEGHAN Coleman for chart documentation. 11/20/2024  19:50 EST    11/20/2024  15:14 EST        MEGHAN Coleman  [facility]     Part of this note may be an electronic transcription/translation of spoken language to printed text using the Dragon Dictation System.  "

## 2024-11-22 ENCOUNTER — PATIENT OUTREACH (OUTPATIENT)
Age: 89
End: 2024-11-22
Payer: MEDICARE

## 2024-11-22 NOTE — OUTREACH NOTE
SW attempted an outreach and left a voicemail with callback information. SW will attempt again in two days.  Brandy CARDOSO -   Ambulatory Case Management    11/22/2024, 14:17 EST

## 2024-11-25 ENCOUNTER — PATIENT OUTREACH (OUTPATIENT)
Age: 89
End: 2024-11-25
Payer: MEDICARE
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
## 2024-12-09 ENCOUNTER — PATIENT OUTREACH (OUTPATIENT)
Dept: CASE MANAGEMENT | Facility: OTHER | Age: 89
End: 2024-12-09
Payer: MEDICARE

## 2024-12-09 NOTE — OUTREACH NOTE
AMBULATORY CASE MANAGEMENT NOTE    Names and Relationships of Patient/Support Persons: Contact: KERI PERERA; Relationship: Emergency Contact -     Patient Outreach    Spoke with patient's son as an HRCM follow up call. Son denied medical related questions/needs. Patient will follow up with neuropsychology and social work for assistance.  HRCM closed.     Ambulatory Case Management    12/9/2024, 10:57 EST

## 2025-01-02 RX ORDER — FOLIC ACID/VIT B COMPLEX AND C 0.8 MG
1 TABLET ORAL DAILY
Qty: 90 TABLET | Refills: 3 | Status: SHIPPED | OUTPATIENT
Start: 2025-01-02

## 2025-01-02 NOTE — TELEPHONE ENCOUNTER
Caller: Cecil Yolie MITALI    Relationship: Self    Best call back number: [phone]     Requested Prescriptions:   Requested Prescriptions     Pending Prescriptions Disp Refills    b complex-vitamin c-folic acid (NEPHRO-IRMA) 0.8 MG tablet tablet 30 tablet      Sig: Take 1 tablet by mouth Daily.        Pharmacy where request should be sent: [id number] [address] AT [facility] [address]  - [phone] [facility] [phone]      Last office visit with prescribing clinician: 10/4/2024   Last telemedicine visit with prescribing clinician: Visit date not found   Next office visit with prescribing clinician: 1/10/2025     Additional details provided by patient: OUT OF MEDICATION.    Does the patient have less than a 3 day supply:  [x] Yes  [] No    Would you like a call back once the refill request has been completed: [x] Yes [] No    If the office needs to give you a call back, can they leave a voicemail: [x] Yes [] No    Thee Mahajan Rep   01/02/25 16:09 EST

## 2025-01-30 ENCOUNTER — PATIENT OUTREACH (OUTPATIENT)
Age: OVER 89
End: 2025-01-30
Payer: MEDICARE

## 2025-01-30 NOTE — OUTREACH NOTE
Care Coordination    Reviewed chart. No other needs  expressed since pt was placed in monitoring. SW will close referral.  Brandy CARDOSO -   Ambulatory Case Management    1/30/2025, 09:45 EST

## 2025-02-04 NOTE — PROGRESS NOTES
[facility] Medicine Services  PROGRESS NOTE    Patient Name: Yolie Jacob  : 1935  MRN: [id number]    Date of Admission: 2022  Primary Care Physician: Provider, No Known    Subjective   Subjective     CC:  CVA workup    HPI:  Sitting up eating breakfast. She reports visual complaints about the same. Agreeable to rehab. Understands that she needs to be compliant with medications in the future importantly her blood thinner      ROS:  Gen- No fevers, chills  CV- No chest pain, palpitations  Resp- No cough, dyspnea  GI- No N/V/D, abd pain  +visual changes with blurry vision        Objective   Objective     Vital Signs:   Temp:  [97.9 °F (36.6 °C)-98.2 °F (36.8 °C)] 98.2 °F (36.8 °C)  Heart Rate:  [65-94] 65  Resp:  [16-18] 18  BP: (130-144)/(72-95) 132/72     Physical Exam:  GEN: NAD, resting in bed, awake,eating breakfast  HEENT: on room air, atraumatic, normocephalic  NECK: supple, no masses  RESP: on room air, normal effort  CV: on tele, sinus rhythm  PSYCH: normal affect, appropriate  NEURO: awake, alert, no focal deficits noted aside from visual complaints   MSK: no edema noted  SKIN: no rashes noted       Results Reviewed:  LAB RESULTS:      Lab 22  0752 22  1440   WBC 4.13 5.12   HEMOGLOBIN 14.3 15.3   HEMATOCRIT 44.0 44.2   PLATELETS 207 213   NEUTROS ABS 2.92 3.96   IMMATURE GRANS (ABS) 0.01 0.01   LYMPHS ABS 0.65* 0.64*   MONOS ABS 0.43 0.43   EOS ABS 0.10 0.06   MCV 90.9 87.2         Lab 22  0752 22  0726 22  1440   SODIUM 139  --  141   POTASSIUM 3.6  --  3.7   CHLORIDE 108*  --  107   CO2 21.0*  --  24.0   ANION GAP 10.0  --  10.0   BUN 19  --  16   CREATININE 0.65  --  0.61   EGFR 85.3  --  86.7   GLUCOSE 96  --  102*   CALCIUM 9.2  --  9.1   MAGNESIUM  --   --  2.0   HEMOGLOBIN A1C  --  5.30  --    TSH  --   --  2.040         Lab 22  0752 22  1440   TOTAL PROTEIN 5.9* 6.4   ALBUMIN 3.50 4.20   GLOBULIN 2.4 2.2   ALT (SGPT)  11 14   AST (SGOT) 14 17   BILIRUBIN 0.5 0.6   ALK PHOS 90 102         Lab 07/01/22  1440   TROPONIN T 0.014         Lab 07/02/22  0726   CHOLESTEROL 242*   LDL CHOL 162*   HDL CHOL 41   TRIGLYCERIDES 208*         Lab 07/01/22  1440   FOLATE 19.70   VITAMIN B 12 267         Brief Urine Lab Results  (Last result in the past 365 days)      Color   Clarity   Blood   Leuk Est   Nitrite   Protein   CREAT   Urine HCG        07/01/22 1557 Yellow   Clear   Negative   Trace   Negative   Negative                 Microbiology Results Abnormal     Procedure Component Value - Date/Time    COVID PRE-OP / PRE-PROCEDURE SCREENING ORDER (NO ISOLATION) - Swab, Nasopharynx [[id number]]  (Normal) Collected: 07/01/22 1555    Lab Status: Final result Specimen: Swab from Nasopharynx Updated: 07/01/22 1617    Narrative:      The following orders were created for panel order COVID PRE-OP / PRE-PROCEDURE SCREENING ORDER (NO ISOLATION) - Swab, Nasopharynx.  Procedure                               Abnormality         Status                     ---------                               -----------         ------                     COVID-19 and FLU A/B PCR...[[id number]]  Normal              Final result                 Please view results for these tests on the individual orders.    COVID-19 and FLU A/B PCR - Swab, Nasopharynx [[id number]]  (Normal) Collected: 07/01/22 1555    Lab Status: Final result Specimen: Swab from Nasopharynx Updated: 07/01/22 1617     COVID19 Not Detected     Influenza A PCR Not Detected     Influenza B PCR Not Detected    Narrative:      Fact sheet for providers: https://www.fda.gov/media/372216/download    Fact sheet for patients: https://www.fda.gov/media/140958/download    Test performed by PCR.          Adult Transthoracic Echo Complete W/ Cont if Necessary Per Protocol (With Agitated Saline)    Result Date: 7/2/2022  · Left ventricular wall thickness is consistent with mild concentric hypertrophy. · Normal left  ventricular systolic function, estimated EF 55%. · Left ventricular diastolic function is consistent with (grade I) impaired relaxation. · Aortic sclerosis with mild aortic insufficiency, no evidence of aortic stenosis. · Mild mitral vegetation. · Trace to mild tricuspid regurgitation with normal RVSP.      CT Angiogram Neck    Result Date: 7/1/2022  CT ANGIOGRAM HEAD-, CT ANGIOGRAM NECK-  Date of Exam: 7/1/2022 3:33 PM  Indication: headache, h/o recently dxd aneurysm.  Comparison Exams: None available.  Technique:  Multiple axial images were obtained from the aortic arch through the vertex before and after the administration contrast for CT angiogram of the brain. Volumetric imaging was performed and multiplanar and 3-dimensional angiographic images were reformatted on an independent workstation. Automatic exposure intraoperative reconstruction methods were utilized to minimize radiation dose.   FINDINGS:  CTA NECK: There is mild calcified plaque of the aortic arch.  Origins of the great vessels appear widely patent.  Right brachiocephalic artery is patent.  Visualized right subclavian artery is patent.  Right common carotid artery is patent without focal stenosis.  No significant plaque the right carotid bifurcation.  No significant stenosis of the right internal or external carotid arteries.  Left common carotid artery is patent without stenosis.  There is minimal calcified plaque the left carotid bifurcation.  No significant stenosis of the left internal or external carotid arteries.  The left subclavian artery appears patent.  Vertebral arteries are codominant and patent without definite stenosis.  Soft tissues of the neck appear within normal limits.  There is calcified granuloma within the medial right lung apex.  Visualized lung apices are otherwise clear.  There are degenerative changes of spine. There are no lytic or sclerotic bony lesions identified.  CTA HEAD: There is fusiform aneurysmal enlargement of  the supraclinoid segment of the left internal carotid artery.  This measures up to 8 mm in maximum diameter.  The left middle cerebral artery and anterior cerebral arteries are of normal caliber.  There is irregularity of the left anterior cerebral artery consistent with intracranial atherosclerosis.  Right intracranial internal carotid artery is patent without focal stenosis or aneurysm.  Right anterior and middle cerebral arteries appear patent without focal stenosis.  Vertebral arteries are patent skull base.  Basilar artery is patent without focal stenosis or occlusion.  The bilateral superior cerebellar arteries appear patent.  There is fetal-type origin of the left posterior cerebral artery.  This arises from the aneurysmal segment of the left internal carotid artery.  There is irregularity of the distal branch of the left posterior cerebral artery compatible with intracranial atherosclerosis.  There is occlusion of the P2 segment of the right posterior cerebral artery.  No posterior circulation aneurysm identified.  There is no pathologic intracranial contrast enhancement.  Globes and orbits appear within normal limits.  Visualized paranasal sinuses and mastoid air cells appear clear.      Impression:  1.  No evidence of carotid or vertebral artery stenosis. 2.  Fusiform aneurysmal enlargement of the supraclinoid segment of the left internal carotid artery.  The left anterior cerebral and middle cerebral arteries are of normal caliber. 2.  There is mild irregularity of the left anterior cerebral artery and a distal branch of the left posterior cerebral artery consistent with intracranial atherosclerosis. 3.  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 Findings personally discussed with Dr. Riggins of the emergency department at 4:20 PM on 7/1/2022  This report was finalized on 7/1/2022 4:25 PM by Noman Varghese MD.      MRI Brain Without Contrast    Result Date: 7/2/2022  DATE OF EXAM:  7/2/2022 5:00 AM  PROCEDURE: MRI BRAIN WO CONTRAST-  INDICATIONS: Stroke, follow up  COMPARISON: CTA head and neck  TECHNIQUE: Multiplanar multisequence images of the brain were performed without contrast according to routine brain MRI protocol.  FINDINGS: On diffusion there are signal changes in the right thalamus the region of the splenium of the corpus callosum and right occipital lobe that could relate to a right posterior cerebral artery territory infarct. There are periventricular, and deep white matter changes as well as signal in the basal ganglia and thalamic areas that could reflect more chronic small vessel ischemic change. There is no definite orbital abnormality. The pituitary is not enlarged. On susceptibility imaging there is blooming in the thalamic areas, as well as the right frontal parietal region in addition to some areas within the more central cerebellar region that may be reflective of hypertension. There is fusiform dilatation of the supraclinoid portion left ICA which is noted on CTA.      Impression: 1.  Restricted diffusion in the territory of the right posterior cerebral artery. 2.  T2 signal changes involving both cerebral hemispheres occur reflect more chronic small vessel ischemic change. 3.  Susceptibility artifact within the thalamic areas and cerebellum that could be seen with hypertension. There additionally some susceptibility artifact in the right frontal parietal area. This also may relate to hypertension or could be seen with amyloid angiopathy.  This report was finalized on 7/2/2022 8:23 AM by Kailash Parker MD.      XR Chest 1 View    Result Date: 7/1/2022  DATE OF EXAM: 7/1/2022 2:04 PM  PROCEDURE: XR CHEST 1 VW-  INDICATIONS: Weak/Dizzy/AMS triage protocol  COMPARISON: No comparisons available.  TECHNIQUE: Single radiographic AP view of the chest was obtained.  FINDINGS: History indicates generalized weakness and dizziness. The heart shadow is in the upper range of normal  size. Pulmonary vasculature appears normal. Lungs are well-expanded to mildly hyperinflated, and show a coarsened, mildly reticular interstitial disease pattern which may reflect mild senescent change, possibly changes of obstructive or reactive airways disease. There is a small area of linear scarring in the left lateral mid lung, but no other significant focal disease. No effusion or pneumothorax is seen.      Impression: Borderline heart shadow enlargement and mild chronic appearing interstitial changes. No clearly acute chest pathology is identified.  This report was finalized on 7/1/2022 2:20 PM by Dr. Mil Betancourt MD.      CT Angiogram Head    Result Date: 7/1/2022  CT ANGIOGRAM HEAD-, CT ANGIOGRAM NECK-  Date of Exam: 7/1/2022 3:33 PM  Indication: headache, h/o recently dxd aneurysm.  Comparison Exams: None available.  Technique:  Multiple axial images were obtained from the aortic arch through the vertex before and after the administration contrast for CT angiogram of the brain. Volumetric imaging was performed and multiplanar and 3-dimensional angiographic images were reformatted on an independent workstation. Automatic exposure intraoperative reconstruction methods were utilized to minimize radiation dose.   FINDINGS:  CTA NECK: There is mild calcified plaque of the aortic arch.  Origins of the great vessels appear widely patent.  Right brachiocephalic artery is patent.  Visualized right subclavian artery is patent.  Right common carotid artery is patent without focal stenosis.  No significant plaque the right carotid bifurcation.  No significant stenosis of the right internal or external carotid arteries.  Left common carotid artery is patent without stenosis.  There is minimal calcified plaque the left carotid bifurcation.  No significant stenosis of the left internal or external carotid arteries.  The left subclavian artery appears patent.  Vertebral arteries are codominant and patent without definite  stenosis.  Soft tissues of the neck appear within normal limits.  There is calcified granuloma within the medial right lung apex.  Visualized lung apices are otherwise clear.  There are degenerative changes of spine. There are no lytic or sclerotic bony lesions identified.  CTA HEAD: There is fusiform aneurysmal enlargement of the supraclinoid segment of the left internal carotid artery.  This measures up to 8 mm in maximum diameter.  The left middle cerebral artery and anterior cerebral arteries are of normal caliber.  There is irregularity of the left anterior cerebral artery consistent with intracranial atherosclerosis.  Right intracranial internal carotid artery is patent without focal stenosis or aneurysm.  Right anterior and middle cerebral arteries appear patent without focal stenosis.  Vertebral arteries are patent skull base.  Basilar artery is patent without focal stenosis or occlusion.  The bilateral superior cerebellar arteries appear patent.  There is fetal-type origin of the left posterior cerebral artery.  This arises from the aneurysmal segment of the left internal carotid artery.  There is irregularity of the distal branch of the left posterior cerebral artery compatible with intracranial atherosclerosis.  There is occlusion of the P2 segment of the right posterior cerebral artery.  No posterior circulation aneurysm identified.  There is no pathologic intracranial contrast enhancement.  Globes and orbits appear within normal limits.  Visualized paranasal sinuses and mastoid air cells appear clear.      Impression:  1.  No evidence of carotid or vertebral artery stenosis. 2.  Fusiform aneurysmal enlargement of the supraclinoid segment of the left internal carotid artery.  The left anterior cerebral and middle cerebral arteries are of normal caliber. 2.  There is mild irregularity of the left anterior cerebral artery and a distal branch of the left posterior cerebral artery consistent with  intracranial atherosclerosis. 3.  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 Findings personally discussed with Dr. Riggins of the emergency department at 4:20 PM on 7/1/2022  This report was finalized on 7/1/2022 4:25 PM by Noman Varghese MD.        Results for orders placed during the hospital encounter of 07/01/22    Adult Transthoracic Echo Complete W/ Cont if Necessary Per Protocol (With Agitated Saline)    Interpretation Summary  · Left ventricular wall thickness is consistent with mild concentric hypertrophy.  · Normal left ventricular systolic function, estimated EF 55%.  · Left ventricular diastolic function is consistent with (grade I) impaired relaxation.  · Aortic sclerosis with mild aortic insufficiency, no evidence of aortic stenosis.  · Mild mitral vegetation.  · Trace to mild tricuspid regurgitation with normal RVSP.      I have reviewed the medications:  Scheduled Meds:aspirin, 325 mg, Oral, Daily  atorvastatin, 80 mg, Oral, Nightly  rivaroxaban, 20 mg, Oral, Daily With Dinner  senna-docusate sodium, 2 tablet, Oral, BID  sodium chloride, 10 mL, Intravenous, Q12H  sodium chloride, 10 mL, Intravenous, Q12H      Continuous Infusions:   PRN Meds:.•  acetaminophen **OR** acetaminophen **OR** acetaminophen  •  ALPRAZolam  •  senna-docusate sodium **AND** polyethylene glycol **AND** bisacodyl **AND** bisacodyl  •  sodium chloride  •  sodium chloride  •  sodium chloride    Assessment & Plan   Assessment & Plan     Active Hospital Problems    Diagnosis  POA   • Visual disturbance [H53.9]  Yes   • Ataxia [R27.0]  Unknown   • Paresthesia and pain of left extremity [M79.609, R20.2]  Unknown   • Headache [R51.9]  Unknown   • A-fib ([facility]) [I48.91]  Unknown      Resolved Hospital Problems   No resolved problems to display.        Brief Hospital Course to date:    Yolie Jacob is a 87 y.o. female PMH afib (eliquis), hx of COVID (08/2021) presenting with ataxia, headache, dizziness, blurry  vision, decreased sensation to the left arm and left leg.     This patient's problems and plans were partially entered by my partner and updated as appropriate by me 07/03/22.         Patient with disturbance, headache, ataxia, left-sided paresthesia   Acute ischemic stroke- right PCA- felt to be cardioembolic   Right sided p2 (of right pca) occlusion on CT Angiogram  LICA Supraclinoid aneurysm 9 mm  -patient reports recently taking eliquis 2.5mg once daily instead of twice daily,? She reports concern over bleeding  - Recently discharged from [facility] reporting aneurysm.  - CTA head and neck showed possible right P2 occlusion unsure if it is acute or chronic  - MRI brain without contrast is done with noted restricted diffusion in the territory of R PCA. T2 changes in both hemispheres which appear to be more small vessel change.   - Aspirin recommended to go to full dosing, atorvastatin 40 mg at bedtime.  - A1c, lipid panel, TTE; all reviewed  - PT/OT/SLP- recommending rehab and patient is agreeable  -- patient will be transitioned to xarelto given once daily dosing and have counseled patient on importance of compliance      Atrial fibrillation, rate controlled  - Patient reports taking Eliquis 2.5 mg daily prior to symptoms (instead of her prescribed 2.5mg bid). More recently had been taken off eliquis altogether since her recent admission at [facility] a few days ago.         HLD  - Atorvastatin 40 mg daily.  Lipid panel noted        Expected Discharge Location and Transportation: rehab- patient agreeable  Expected Discharge Date: 7/5    DVT prophylaxis:  Medical and mechanical DVT prophylaxis orders are present.     AM-PAC 6 Clicks Score (PT): 13 (07/02/22 2008)    CODE STATUS:   Code Status and Medical Interventions:   Ordered at: 07/01/22 9674     Level Of Support Discussed With:    Patient     Code Status (Patient has no pulse and is not breathing):    CPR (Attempt to Resuscitate)     Medical  Interventions (Patient has pulse or is breathing):    Full Support       Kamilah Hdez MD  07/03/22               [Negative] : Endocrine [TextBox_30] : above

## 2025-02-06 ENCOUNTER — OFFICE VISIT (OUTPATIENT)
Dept: INTERNAL MEDICINE | Facility: CLINIC | Age: OVER 89
End: 2025-02-06
Payer: MEDICARE

## 2025-02-06 VITALS
OXYGEN SATURATION: 97 % | WEIGHT: 134 LBS | HEIGHT: 56 IN | RESPIRATION RATE: 16 BRPM | SYSTOLIC BLOOD PRESSURE: 126 MMHG | DIASTOLIC BLOOD PRESSURE: 78 MMHG | BODY MASS INDEX: 30.14 KG/M2 | HEART RATE: 76 BPM | TEMPERATURE: 99 F

## 2025-02-06 DIAGNOSIS — E56.9 VITAMIN DEFICIENCY: ICD-10-CM

## 2025-02-06 DIAGNOSIS — E78.2 MIXED HYPERLIPIDEMIA: Primary | ICD-10-CM

## 2025-02-06 DIAGNOSIS — I63.9 CARDIOEMBOLIC STROKE: ICD-10-CM

## 2025-02-06 DIAGNOSIS — R25.2 MUSCLE CRAMPING: ICD-10-CM

## 2025-02-06 DIAGNOSIS — I48.91 ATRIAL FIBRILLATION, UNSPECIFIED TYPE: ICD-10-CM

## 2025-02-06 PROCEDURE — G0439 PPPS, SUBSEQ VISIT: HCPCS | Performed by: INTERNAL MEDICINE

## 2025-02-06 PROCEDURE — 1170F FXNL STATUS ASSESSED: CPT | Performed by: INTERNAL MEDICINE

## 2025-02-06 PROCEDURE — 1126F AMNT PAIN NOTED NONE PRSNT: CPT | Performed by: INTERNAL MEDICINE

## 2025-02-06 RX ORDER — GUAIFENESIN 600 MG/1
600 TABLET, EXTENDED RELEASE ORAL 2 TIMES DAILY PRN
Qty: 60 TABLET | Refills: 3 | Status: SHIPPED | OUTPATIENT
Start: 2025-02-06

## 2025-02-06 RX ORDER — VITAMIN B COMPLEX
1 CAPSULE ORAL DAILY
Qty: 30 CAPSULE | Refills: 11 | Status: SHIPPED | OUTPATIENT
Start: 2025-02-06 | End: 2026-02-06

## 2025-02-06 RX ORDER — EZETIMIBE 10 MG/1
10 TABLET ORAL DAILY
Qty: 90 TABLET | Refills: 11 | Status: SHIPPED | OUTPATIENT
Start: 2025-02-06 | End: 2025-02-06

## 2025-02-06 RX ORDER — ASCORBIC ACID 500 MG
500 TABLET ORAL DAILY
Qty: 90 TABLET | Refills: 3 | Status: SHIPPED | OUTPATIENT
Start: 2025-02-06 | End: 2025-02-06

## 2025-02-06 RX ORDER — EZETIMIBE 10 MG/1
10 TABLET ORAL DAILY
Qty: 90 TABLET | Refills: 11 | Status: SHIPPED | OUTPATIENT
Start: 2025-02-06 | End: 2026-02-06

## 2025-02-06 RX ORDER — FOLIC ACID/VIT B COMPLEX AND C 0.8 MG
1 TABLET ORAL DAILY
Qty: 90 TABLET | Refills: 3 | Status: SHIPPED | OUTPATIENT
Start: 2025-02-06

## 2025-02-06 RX ORDER — FOLIC ACID 0.8 MG
800 TABLET ORAL DAILY
Qty: 90 TABLET | Refills: 3 | Status: SHIPPED | OUTPATIENT
Start: 2025-02-06 | End: 2025-02-06

## 2025-02-06 NOTE — PROGRESS NOTES
Subjective   The ABCs of the Annual Wellness Visit  Medicare Wellness Visit      Yolie Jacob is a 89 y.o. patient who presents for a Medicare Wellness Visit.    Also here to followup with chronic management of HLD,     The following portions of the patient's history were reviewed and   updated as appropriate: allergies, current medications, past family history, past medical history, past social history, past surgical history, and problem list.    Compared to one year ago, the patient's physical   health is worse.  Compared to one year ago, the patient's mental   health is worse.    Recent Hospitalizations:  This patient has had a [facility] admission record on file within the last 365 days.  Current Medical Providers:  Patient Care Team:  Cabrera Salmeron DO as PCP - General (Internal Medicine)    Outpatient Medications Prior to Visit   Medication Sig Dispense Refill    acetaminophen (TYLENOL) 325 MG tablet Take 2 tablets by mouth Every 4 (Four) Hours As Needed for Mild Pain .      apixaban (ELIQUIS) 5 MG tablet tablet Take 1 tablet by mouth Every 12 (Twelve) Hours. Indications: Atrial Fibrillation      Cholecalciferol (Vitamin D) 50 MCG (2000 UT) tablet Take 1 tablet by mouth Daily. 90 tablet 5    b complex-vitamin c-folic acid (NEPHRO-IRMA) 0.8 MG tablet tablet Take 1 tablet by mouth Daily. 90 tablet 3    co-enzyme Q-10 30 MG capsule Take 1 capsule by mouth 3 (Three) Times a Day. 90 capsule 11    ezetimibe (Zetia) 10 MG tablet Take 1 tablet by mouth Daily. 30 tablet 11    Multiple Vitamins-Minerals (Eye Vitamins) capsule Take 1 each by mouth Daily. (Patient not taking: Reported on 2/6/2025) 90 each 3    pravastatin (Pravachol) 40 MG tablet Take 1 tablet by mouth Daily. (Patient not taking: Reported on 2/6/2025) 90 tablet 3     No facility-administered medications prior to visit.     No opioid medication identified on active medication list. I have reviewed chart for other potential  high risk  "medication/s and harmful drug interactions in the elderly.      Aspirin is not on active medication list.  Aspirin use is contraindicated for this patient due to: current use of Eliquis.  .    Patient Active Problem List   Diagnosis    Visual disturbance    Ataxia    Paresthesia and pain of left extremity    Headache    Atrial fibrillation    Cardioembolic stroke    CVA (cerebral vascular accident)    H/O cardioembolic CVA    Legally blind     Advance Care Planning Advance Directive is not on file.  ACP discussion was held with the patient during this visit. Patient does not have an advance directive, information provided.            Objective   Vitals:    25 1526   BP: 126/78   BP Location: Left arm   Patient Position: Sitting   Cuff Size: Adult   Pulse: 76   Resp: 16   Temp: 99 °F (37.2 °C)   TempSrc: Temporal   SpO2: 97%   Weight: 60.8 kg (134 lb)   Height: 142.2 cm (56\")   PainSc: 0-No pain       Estimated body mass index is 30.04 kg/m² as calculated from the following:    Height as of this encounter: 142.2 cm (56\").    Weight as of this encounter: 60.8 kg (134 lb).                Does the patient have evidence of cognitive impairment? Yes                                                                                                Health  Risk Assessment    Smoking Status:  Social History     Tobacco Use   Smoking Status Never    Passive exposure: Never   Smokeless Tobacco Never     Alcohol Consumption:  Social History     Substance and Sexual Activity   Alcohol Use No       Fall Risk Screen  STEADI Fall Risk Assessment was completed, and patient is at HIGH risk for falls. Assessment completed on:2025    Depression Screening   Little interest or pleasure in doing things? Not at all   Feeling down, depressed, or hopeless? Several days   PHQ-2 Total Score 1      Health Habits and Functional and Cognitive Screenin/6/2025     3:32 PM   Functional & Cognitive Status   Do you have difficulty " preparing food and eating? Yes   Do you have difficulty bathing yourself, getting dressed or grooming yourself? No   Do you have difficulty using the toilet? No   Do you have difficulty moving around from place to place? Yes   Do you have trouble with steps or getting out of a bed or a chair? No   Current Diet Well Balanced Diet   Dental Exam Not up to date   Eye Exam Not up to date   Exercise (times per week) 1 times per week   Current Exercises Include Other   Do you need help using the phone?  Yes   Are you deaf or do you have serious difficulty hearing?  Yes   Do you need help to go to places out of walking distance? Yes   Do you need help shopping? Yes   Do you need help preparing meals?  Yes   Do you need help with housework?  Yes   Do you need help with laundry? Yes   Do you need help taking your medications? Yes   Do you need help managing money? Yes   Do you ever drive or ride in a car without wearing a seat belt? No   Have you felt unusual stress, anger or loneliness in the last month? Yes   Who do you live with? Child   If you need help, do you have trouble finding someone available to you? No   Have you been bothered in the last four weeks by sexual problems? No   Do you have difficulty concentrating, remembering or making decisions? Yes           Age-appropriate Screening Schedule:  Refer to the list below for future screening recommendations based on patient's age, sex and/or medical conditions. Orders for these recommended tests are listed in the plan section. The patient has been provided with a written plan.    Health Maintenance List  Health Maintenance   Topic Date Due    ZOSTER VACCINE (1 of 2) Never done    RSV Vaccine - Adults (1 - 1-dose 75+ series) Never done    DXA SCAN  12/04/2017    COVID-19 Vaccine (1 - 2024-25 season) Never done    INFLUENZA VACCINE  03/31/2025 (Originally 7/1/2024)    LIPID PANEL  10/04/2025    BMI FOLLOWUP  10/04/2025    ANNUAL WELLNESS VISIT  02/06/2026    TDAP/TD  "VACCINES (2 - Tdap) 08/21/2029    Pneumococcal Vaccine 65+  Completed                                                                                                                                                CMS Preventative Services Quick Reference  Risk Factors Identified During Encounter  Fall Risk-High or Moderate: Discussed Fall Prevention in the home, Information on Fall Prevention Shared in After Visit Summary, and Sit to Stand Exercise Information Shared in After Visit Summary    The above risks/problems have been discussed with the patient.  Pertinent information has been shared with the patient in the After Visit Summary.  An After Visit Summary and PPPS were made available to the patient.    Follow Up:   Next Medicare Wellness visit to be scheduled in 1 year.         Additional E&M Note during same encounter follows:  Patient has additional, significant, and separately identifiable condition(s)/problem(s) that require work above and beyond the Medicare Wellness Visit     Chief Complaint  Medicare Wellness-subsequent    Subjective   HPI                  Objective   Vital Signs:  /78 (BP Location: Left arm, Patient Position: Sitting, Cuff Size: Adult)   Pulse 76   Temp 99 °F (37.2 °C) (Temporal)   Resp 16   Ht 142.2 cm (56\")   Wt 60.8 kg (134 lb)   SpO2 97%   BMI 30.04 kg/m²   Physical Exam  Constitutional:       General: She is not in acute distress.     Appearance: Normal appearance. She is not ill-appearing.   HENT:      Right Ear: Tympanic membrane normal.      Left Ear: Tympanic membrane normal.      Nose: No congestion or rhinorrhea.      Mouth/Throat:      Mouth: Mucous membranes are moist.      Pharynx: No oropharyngeal exudate.   Eyes:      Extraocular Movements: Extraocular movements intact.      Conjunctiva/sclera: Conjunctivae normal.      Pupils: Pupils are equal, round, and reactive to light.   Cardiovascular:      Rate and Rhythm: Normal rate and regular rhythm.   Pulmonary: "      Effort: Pulmonary effort is normal. No respiratory distress.      Breath sounds: Normal breath sounds.   Abdominal:      General: Abdomen is flat. Bowel sounds are normal.      Palpations: Abdomen is soft.      Tenderness: There is no abdominal tenderness.   Musculoskeletal:         General: No swelling.      Cervical back: Neck supple.      Right lower leg: No edema.      Left lower leg: No edema.   Skin:     Coloration: Skin is not jaundiced.      Findings: No rash.   Neurological:      General: No focal deficit present.      Mental Status: She is alert and oriented to person, place, and time.      Cranial Nerves: No cranial nerve deficit.   Psychiatric:         Mood and Affect: Mood normal.         Behavior: Behavior normal.         Thought Content: Thought content normal.                       Assessment and Plan     Can't tolerate statin, continue ezetimibe.            Mixed hyperlipidemia       Orders:    ezetimibe (Zetia) 10 MG tablet; Take 1 tablet by mouth Daily.    Muscle cramping    Orders:    co-enzyme Q-10 30 MG capsule; Take 1 capsule by mouth 3 (Three) Times a Day.    Vitamin deficiency    Orders:    b complex vitamins capsule; Take 1 capsule by mouth Daily.    vitamin C (ASCORBIC ACID) 500 MG tablet; Take 1 tablet by mouth Daily.    folic acid (FOLVITE) 800 MCG tablet; Take 1 tablet by mouth Daily.    Atrial fibrillation, unspecified type         Cardioembolic stroke           Advised to get shingles vaccine and rsv vaccine.           Follow Up   No follow-ups on file.  Patient was given instructions and counseling regarding her condition or for health maintenance advice. Please see specific information pulled into the AVS if appropriate.

## 2025-02-11 ENCOUNTER — TELEPHONE (OUTPATIENT)
Age: OVER 89
End: 2025-02-11
Payer: MEDICARE

## 2025-02-11 NOTE — TELEPHONE ENCOUNTER
PERNELL PERERA : 35    PT IS NOT ELIGIBLE TO RECEIVE PT ASSISTANCE FOR  [facility] PT DID NOT PROVIDE THEIR CONSENT FOR THE FAIR CREDIT REPORTING ACT.  APPLICATION WILL NOT PROCESS WITHOUT THIS CONSENT.    [facility]: [phone] CUSTOMER SERVICE    APPLICATION CASE # [id number]

## 2025-02-18 ENCOUNTER — TELEPHONE (OUTPATIENT)
Age: OVER 89
End: 2025-02-18
Payer: MEDICARE

## 2025-05-20 ENCOUNTER — OFFICE VISIT (OUTPATIENT)
Dept: NEUROLOGY | Facility: CLINIC | Age: OVER 89
End: 2025-05-20
Payer: MEDICARE

## 2025-05-20 VITALS
BODY MASS INDEX: 31.05 KG/M2 | OXYGEN SATURATION: 98 % | SYSTOLIC BLOOD PRESSURE: 138 MMHG | TEMPERATURE: 98.1 F | DIASTOLIC BLOOD PRESSURE: 92 MMHG | HEART RATE: 84 BPM | WEIGHT: 138 LBS | HEIGHT: 56 IN | RESPIRATION RATE: 20 BRPM

## 2025-05-20 DIAGNOSIS — R41.89 COGNITIVE CHANGE: ICD-10-CM

## 2025-05-20 DIAGNOSIS — I48.0 PAROXYSMAL ATRIAL FIBRILLATION: ICD-10-CM

## 2025-05-20 DIAGNOSIS — I10 PRIMARY HYPERTENSION: ICD-10-CM

## 2025-05-20 DIAGNOSIS — R41.3 MEMORY LOSS: ICD-10-CM

## 2025-05-20 DIAGNOSIS — E78.5 HYPERLIPIDEMIA, UNSPECIFIED HYPERLIPIDEMIA TYPE: ICD-10-CM

## 2025-05-20 DIAGNOSIS — Z86.73 HISTORY OF STROKE: Primary | ICD-10-CM

## 2025-06-06 NOTE — PROGRESS NOTES
New Patient Office Visit      Encounter Date: 2024   Patient Name: Yolie Jacob  : 1935   MRN: [id number]   PCP: Cabrera Salmeron DO    Referring Provider: No ref. provider found     Chief Complaint:    Chief Complaint   Patient presents with    Follow-up       History of Present Illness: Yolie Jacob is a 89 y.o. female with known medical diagnoses of atrial fibrillation (Eliquis), macular degeneration, arthritis, and Stroke ( R PCA territory infarct) who presented to ED on  with left facial drooping, confusion, balance disturbance, and speech difficulties. She was unsure if she had been taking her Eliquis at home and typically manages this medication herself. In the past, she had been noncompliant with Eliquis only taking the medication once a day. She was switched to Xarellto, but not compliant with it due to cost and thus switched back to Eliquis. Imiging revealed Acute stroke, left MCA territory, Left M3/M4 occlusion and a Supraclinoid 8mm stable ICA aneurysm. Notes from admission recomment outpatient neurosurgery evaluation of aneurysm, however she has been evaluated previously by Dr. Liam Amato concerning the aneurysm and it is currently stable and unchanged since . Given her age and comorbidity, he did not consider her a candidate for surgical intervention. She was discharged home to continue Eliquis 5 mg BID , Atorvastatin 80 mg nightly and follow in stroke clinic in one month. She was determined to be a likely candidate for a watchman device per cardiology.    Clinic Visit on 24: Patient was evaluated with her son at side. She continues to have some confusion and occasional balance disturbances from prior strokes. Her son now lives with her and manages all of her medication and diet. He does not drive and requires a friend to drive them to appointments. Per the son, patient has not missed any doses of Eliquis and is taking 5mg in the morning and at night. Patient has  stopped her statin medication and refuses to take this as it causes her legs to be sore. I discussed the elevation of her LDL and a risk factor for future strokes. As she refuses the statin, I discussed diet modifications with the son who is the one preparing her food.  Per our discussion, it does sound that the patient is eating the same meals as the son is fixing for himself, which often consists of hamburger or pizza at dinner. She does eat eggs, cereal, or oatmeal often for breakfast. As a way to lower LDL via diet, I recommended a Mediterranean diet high in fish, whole grains, fruits and vegetables. I recommended to avoid red meat, fried foods, fast food, and eggs. I believe this will be a challenge going forward as it will require more effort from the son to also change the meals he is making for himself or make separate meals for them both. I discussed the patients blood pressure management. She states that she does have a cardiologist, but I do not see a record of recent appointments. When asked about a primary provider, they informed me they are between providers and are looking into a provider from [facility] as driving to a PCP is troublesome. As she may be a candidate for a watchman and has no current provider treating HTN, I would recommend outpatient cardiology appointment. [facility] 4 today as the patient was confused concerning her age, had ataxia in both finger to nose exams, and has some word finding aphasia. No additional complaints or concerns today    Clinic Visit 11/20/2024 (Ericka Mendoza, APRN: The patient arrives today with her son. She is wearing sunglasses on a cloudy day 2/2 photophobia, which she associates to her history of macular degeneration.  The patient states, she is taking Eliquis twice daily.  he is experiencing anxiety related to a property sale, which she believes was undervalued. This property was her primary source of income, and she is now concerned about her financial stability.  She is questioning her mental competence in handling such transactions. We discussed consultation with neuropsych for a cognitive evaluation and she is agreeable.  She has been advised to take cholesterol medication due to persistently high cholesterol levels. However, she experiences severe cramps when on these medications, leading to her reluctance to continue them. She is currently on Medicare and Blue Cross and Blue Shield supplement, but lacks D coverage. She has been attempting to manage her cholesterol through diet, specifically a Mediterranean diet, which includes salads, dry beans, and tuna. Patient denies any other neurological symptoms, including: headache, vision changes, dysesthesias, loss of consciousness, seizure or new areas of motor weakness.     Clinic Visit 05/20/2025: Patient presents for routine follow up. She arrives with her son, whom she lives with. No new stroke like symptoms. Exam is similar to prior exams with Confusion, word finding difficulty and finger to nose dysmetria. No focal weakness. No numbness. No slurred speech. Patient is legally blind with macular degeneration, making visual field testing challenging. Patient follows with cardiology and was recently approved for financial assistance for Fast FiBR. She no longer requires samples. In questioning, it does seem that patient may have missed some Eliquis doses 2/2 confusion. Cardiology is following with some discussion of Watchman devise as the patient is not compliant with Elliquis. I have instructed her son to manage medications for her as prescribed. Patient is intolerant to Statins and is now taking Zetia. I have encouraged Mediterranean diet, however patients son manages meals and she has been eating similar meals to his. 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 I have reviewed signs and symptoms of stroke and instructed the son to call 911 for new symptoms. This was reiterated  "several times. Son questioned \"What if she does not want an ambulance. Are you telling me to call 911 against her wishes?\". I do not believe the patient is capable of making appropriate medical decisions for herself 2/2 confusion / dementia and have instructed the sone to call 911 for any new obvious stroke symptoms even if his mother does not wish it. Continuing education for both patient and family will be key to prevent future strokes on follow up clinic visits. Outpatient referral made to general neurology for neurocognitive testing. No additional questions today.     Stroke Risk Factors: atrial fibrillation, hyperlipidemia, and hypertension      Subjective      Past Medical History:   Past Medical History:   Diagnosis Date    A-fib     Arthritis     Clotting disorder     H/O cardioembolic CVA 2024    Heart murmur     Hypertension     Kidney stone     Osteopenia     Pancreatitis     Stroke        Past Surgical History:   Past Surgical History:   Procedure Laterality Date    APPENDECTOMY      BREAST SURGERY      CATARACT EXTRACTION       SECTION      EYE SURGERY      HEMORRHOIDECTOMY      INTRAOCULAR LENS INSERTION      TONSILLECTOMY         Family History:   Family History   Problem Relation Age of Onset    Cancer Mother     Osteoporosis Mother     Heart attack Father     Hypertension Sister     Osteoporosis Sister     Stroke Sister        Social History:   Social History     Socioeconomic History    Marital status:    Tobacco Use    Smoking status: Never     Passive exposure: Never    Smokeless tobacco: Never   Vaping Use    Vaping status: Never Used   Substance and Sexual Activity    Alcohol use: No    Drug use: No    Sexual activity: Defer       Medications:     Current Outpatient Medications:     acetaminophen (TYLENOL) 325 MG tablet, Take 2 tablets by mouth Every 4 (Four) Hours As Needed for Mild Pain ., Disp: , Rfl:     apixaban (ELIQUIS) 2.5 MG tablet tablet, Take 1 tablet by mouth 2 " "(Two) Times a Day., Disp: 28 tablet, Rfl: 0    b complex vitamins capsule, Take 1 capsule by mouth Daily., Disp: 30 capsule, Rfl: 11    b complex-vitamin c-folic acid (NEPHRO-IRMA) 0.8 MG tablet tablet, Take 1 tablet by mouth Daily., Disp: 90 tablet, Rfl: 3    Cholecalciferol (Vitamin D) 50 MCG (2000 UT) tablet, Take 1 tablet by mouth Daily., Disp: 90 tablet, Rfl: 5    co-enzyme Q-10 30 MG capsule, Take 1 capsule by mouth 3 (Three) Times a Day., Disp: 90 capsule, Rfl: 11    ezetimibe (Zetia) 10 MG tablet, Take 1 tablet by mouth Daily., Disp: 90 tablet, Rfl: 11    guaiFENesin (Mucinex) 600 MG 12 hr tablet, Take 1 tablet by mouth 2 (Two) Times a Day As Needed (phlegm)., Disp: 60 tablet, Rfl: 3    Multiple Vitamins-Minerals (Eye Vitamins) capsule, Take 1 each by mouth Daily. (Patient not taking: Reported on 11/20/2024), Disp: 90 each, Rfl: 3    Allergies:   Allergies   Allergen Reactions    Procaine Anaphylaxis    Codeine Other (See Comments)     Passed out    Latex Itching    Levofloxacin Other (See Comments)     Levaquin    Morphine Itching    Penicillins Itching    Statins Myalgia    Sulfa Antibiotics Hives    Doxycycline Palpitations    Keflex [Cephalexin] Palpitations       Objective     Physical Exam:  Vital Signs:   Vitals:    05/20/25 1508   BP: 138/92   Pulse: 84   Resp: 20   Temp: 98.1 °F (36.7 °C)   SpO2: 98%   Weight: 62.6 kg (138 lb)   Height: 142.2 cm (55.98\")   PainSc: 0-No pain     Body mass index is 30.96 kg/m².     Physical Exam  Constitutional:       General: She is awake. She is not in acute distress.     Appearance: She is not ill-appearing.   HENT:      Head: Normocephalic and atraumatic.   Eyes:      General: Lids are normal.      Extraocular Movements: Extraocular movements intact.      Pupils: Pupils are equal, round, and reactive to light.   Cardiovascular:      Rate and Rhythm: Normal rate.   Pulmonary:      Effort: Pulmonary effort is normal. No respiratory distress.   Musculoskeletal:       "   General: No signs of injury.      Right lower leg: No edema.      Left lower leg: No edema.   Skin:     Findings: Bruising present.   Neurological:      Mental Status: She is alert. She is disoriented.      Cranial Nerves: No cranial nerve deficit.      Sensory: No sensory deficit.      Motor: No weakness.      Coordination: Coordination abnormal.   Psychiatric:         Speech: Speech normal.      Comments: Judgement impaired        Neurological Exam  Mental Status  Awake and alert. Oriented only to person and place. Speech is normal. Expressive aphasia present. Fund of knowledge is abnormal.    Cranial Nerves  CN II: Visual fields full to confrontation.  CN III, IV, VI: Extraocular movements intact bilaterally. Normal lids and orbits bilaterally. Pupils equal round and reactive to light bilaterally.  CN V: Facial sensation is normal.  CN VII: Full and symmetric facial movement.  CN XII: Tongue midline without atrophy or fasciculations.    Motor  Normal muscle bulk throughout. No fasciculations present. Normal muscle tone.  At least 4/5 in all extremities .    Sensory  Light touch is normal in upper and lower extremities.     Coordination  Right: Finger-to-nose abnormality: Heel-to-shin normal.Left: Finger-to-nose abnormality: Heel-to-shin normal.  Ataxia coordination of finger to nose .    Gait  Casual gait: Narrow stance. Reduced stride length. Hesitant gait.  Patient slightly off balance when turning. 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 .       NIH Stroke Scale    1a  Level of consciousness: 0=alert; keenly responsive   1b. LOC questions:  1=Answers one question correctly    1c. LOC commands: 0=Performs both tasks correctly   2.  Best Gaze: 0=normal   3. Visual: 0=No visual loss   4. Facial Palsy: 0=Normal symmetric movement   5a. Motor left arm: 0=No drift, limb holds 90 (or 45) degrees for full 10 seconds   5b.  Motor right  arm: 0=No drift, limb holds 90 (or 45) degrees for full 10 seconds   6a. Motor left le=No drift, limb holds 90 (or 45) degrees for full 10 seconds   6b  Motor right le=No drift, limb holds 90 (or 45) degrees for full 10 seconds   7. Limb Ataxia: 2=Present in two limbs   8.  Sensory: 0=Normal; no sensory loss   9. Best Language:  1=Mild to moderate aphasia; some obvious loss of fluency or facility of comprehension without significant limitation on ideas expressed or form of expression.   10. Dysarthria: 0=Normal   11. Extinction and Inattention: 0=No abnormality    Total:   4         Modified Bryan Score: 3        0  No Symptoms    1 No significant disability. Able to carry out all usual activities, despite some symptoms.    2 Slight disability. Able to look after own affairs without assistance, but unable to carry out all previous activities.    3 Moderate disability. Requires some help, but able to walk unassisted.    4 Moderately severe disability. Unable to attend to own bodily needs without assistance, and unable to walk unassisted.    5 Severe disability. Requires constant nursing care and attention, bedridden, incontinent.    6 Dead               Imaging Reviewed:     No radiology results for the last 90 days.     CT Angiogram Head/Neck     Result Date: 2024  Impression: 1.Stable 8 mm fusiform aneurysm of the supraclinoid left internal carotid artery. 2.Occlusion of the P2 segment of the right posterior cerebral artery. 3.Occlusion of the distal left M4 branch of the middle cerebral artery. 4.Intracranial atherosclerosis. Electronically Signed: Familia Karimi MD  2024 12:23 AM EDT  Workstation ID: [id number]     CT CEREBRAL PERFUSION WITH & WITHOUT CONTRAST     Result Date: 2024  Impression: 17 mL of ischemia in the left parietal lobe in the distribution of the left middle cerebral artery. Electronically Signed: Familia Karimi MD  2024 12:06 AM EDT  Workstation ID: [id number]     CT Head  Without Contrast Stroke Protocol     Result Date: 7/20/2024  Impression: Atrophy and chronic microvascular ischemic change. No acute intracranial process. Electronically Signed: Familia Karimi MD  7/20/2024 11:16 PM EDT  Workstation ID: [id number]        MRI of head results from the last 21 days:[]Expand by Default   MRI Brain Without Contrast     Result Date: 7/22/2024  MRI BRAIN WO CONTRAST Date of Exam: 7/22/2024 4:57 AM EDT Indication: Stroke, follow up stroke.  Comparison: CT scan the head dated 7/20/2024 Technique:  Routine multiplanar/multisequence sequence images of the brain were obtained without contrast administration. Findings: There is mild diffuse generalized atrophy. There are areas of increased T2 and FLAIR signal throughout the bilateral periventricular and subcortical white matter consistent with chronic microvascular ischemia. There is pathologic restricted diffusion in the posterior left parietal lobe in the distribution of the left middle cerebral artery. There is no evidence of an acute hemorrhage. There is no mass or mass effect. There are no abnormal extra-axial fluid collections.      Impression: Impression: Acute infarct in the distribution of the left middle cerebral artery. Electronically Signed: Familia Karimi MD  7/22/2024 6:29 AM EDT  Workstation ID: [id number]          Results for orders placed during the hospital encounter of 07/01/22   Adult Transthoracic Echo Complete W/ Cont if Necessary Per Protocol (With Agitated Saline)   Interpretation Summary  · Left ventricular wall thickness is consistent with mild concentric hypertrophy.  · Normal left ventricular systolic function, estimated EF 55%.  · Left ventricular diastolic function is consistent with (grade I) impaired relaxation.  · Aortic sclerosis with mild aortic insufficiency, no evidence of aortic stenosis.  · Mild mitral vegetation.  · Trace to mild tricuspid regurgitation with normal RVSP.    Laboratory Results:   A1c on 7/21:  5.10  LDL on 7/21: 156  H/H 15.6/46  AST/ALT 26/21    Lipid Panel          7/21/2024    12:34 10/4/2024    15:53   Lipid Panel   Total Cholesterol 231  251    Triglycerides 188  134    HDL Cholesterol 40  49    VLDL Cholesterol 35  24    LDL Cholesterol  156  178    LDL/HDL Ratio 3.84  3.58       Most Recent A1C          10/4/2024    15:53   HGBA1C Most Recent   Hemoglobin A1C 5.40       Lab Results   Component Value Date    GLUCOSE 106 (H) 10/04/2024    BUN 12 10/04/2024    CREATININE 0.75 10/04/2024     10/04/2024    K 3.8 10/04/2024     10/04/2024    CALCIUM 9.8 10/04/2024    PROTEINTOT 6.2 10/04/2024    ALBUMIN 4.0 10/04/2024    ALT 17 10/04/2024    AST 22 10/04/2024    ALKPHOS 89 10/04/2024    BILITOT 0.4 10/04/2024    GLOB 2.2 10/04/2024    AGRATIO 1.8 10/04/2024    BCR 16.0 10/04/2024    ANIONGAP 13.6 10/04/2024    EGFR 76.2 10/04/2024          Assessment / Plan      Assessment/Plan:   Diagnoses and all orders for this visit:    1. History of stroke (Primary)  2. Paroxysmal atrial fibrillation  - L MCA stroke with distal M3 occlusion (2024)  - R PCA infarct (2022)  - history of noncompliance with Eliquis  - Continue Eliquis 2.5 mg BID (son to managing medications)  - modify risk factors of HTN, HLD as below  - Reviewed S/S of tan , call 911 for new symptoms   - follow up with cardiology to discuss watchman device     3. Hyperlipidemia, unspecified hyperlipidemia type  - Patient refuses to take statin medication  - Continue Zetia 10 mg  - Last   - recommend low cholesterol diet wit modifications utilizing Mediterranean diet plan   - increase physical activity as tolerated    4. Primary hypertension  - Patient on no current BP control medications  - BP goals less than 130/80. BP today = 138/92  - Avoid hypotension  - Follow up with cardiology to discuss HTN     5. Confusion / Memory Loss (Suspected Vascular Dementia)  - Referral  to General Neurology for NeuroCognitive Testing     Discussed  the importance of medication compliance and lifestyle modifications (adequate blood pressure control, adequate control of hyperlipidemia, adequate glycemic control, increase physical activity, and healthy diet) to help reduce the risk of future cerebrovascular events.  Also discussed the signs symptoms that would warrant the patient return back to the emergency department including unilateral weakness, unilateral numbness, visual disturbances, loss of balance, speech difficulties, and/or a sudden severe headache.     Follow Up:   Follow up with neuro stroke in approx 6 months    Sumit Christianson PA-C  [facility]

## 2025-06-16 ENCOUNTER — OFFICE VISIT (OUTPATIENT)
Dept: INTERNAL MEDICINE | Age: OVER 89
End: 2025-06-16
Payer: MEDICARE

## 2025-06-16 ENCOUNTER — LAB (OUTPATIENT)
Dept: INTERNAL MEDICINE | Age: OVER 89
End: 2025-06-16
Payer: MEDICARE

## 2025-06-16 VITALS
OXYGEN SATURATION: 95 % | DIASTOLIC BLOOD PRESSURE: 80 MMHG | TEMPERATURE: 97.8 F | SYSTOLIC BLOOD PRESSURE: 126 MMHG | BODY MASS INDEX: 30.59 KG/M2 | HEART RATE: 88 BPM | RESPIRATION RATE: 20 BRPM | HEIGHT: 56 IN | WEIGHT: 136 LBS

## 2025-06-16 DIAGNOSIS — Z00.00 HEALTHCARE MAINTENANCE: Primary | ICD-10-CM

## 2025-06-16 DIAGNOSIS — H35.30 MACULAR DEGENERATION OF BOTH EYES, UNSPECIFIED TYPE: Primary | ICD-10-CM

## 2025-06-16 DIAGNOSIS — E55.9 VITAMIN D DEFICIENCY: ICD-10-CM

## 2025-06-16 DIAGNOSIS — M79.10 MYALGIA: ICD-10-CM

## 2025-06-16 DIAGNOSIS — E78.2 MIXED HYPERLIPIDEMIA: ICD-10-CM

## 2025-06-16 LAB
CHOLEST SERPL-MCNC: 256 MG/DL (ref 0–200)
HDLC SERPL-MCNC: 42 MG/DL (ref 40–60)
LDLC SERPL CALC-MCNC: 174 MG/DL (ref 0–100)
LDLC/HDLC SERPL: 4.08 {RATIO}
TRIGL SERPL-MCNC: 213 MG/DL (ref 0–150)
VLDLC SERPL-MCNC: 40 MG/DL (ref 5–40)

## 2025-06-16 PROCEDURE — 99214 OFFICE O/P EST MOD 30 MIN: CPT | Performed by: INTERNAL MEDICINE

## 2025-06-16 PROCEDURE — 82306 VITAMIN D 25 HYDROXY: CPT | Performed by: INTERNAL MEDICINE

## 2025-06-16 PROCEDURE — 36415 COLL VENOUS BLD VENIPUNCTURE: CPT | Performed by: INTERNAL MEDICINE

## 2025-06-16 PROCEDURE — 1126F AMNT PAIN NOTED NONE PRSNT: CPT | Performed by: INTERNAL MEDICINE

## 2025-06-16 PROCEDURE — 80061 LIPID PANEL: CPT | Performed by: INTERNAL MEDICINE

## 2025-06-16 RX ORDER — CHLORAL HYDRATE 500 MG
2000 CAPSULE ORAL
Qty: 120 CAPSULE | Refills: 3 | Status: SHIPPED | OUTPATIENT
Start: 2025-06-16

## 2025-06-16 RX ORDER — ANTIOX #8/OM3/DHA/EPA/LUT/ZEAX 250-2.5 MG
1 CAPSULE ORAL 2 TIMES DAILY
Qty: 60 CAPSULE | Refills: 5 | Status: SHIPPED | OUTPATIENT
Start: 2025-06-16 | End: 2025-06-18 | Stop reason: SDUPTHER

## 2025-06-16 RX ORDER — EZETIMIBE 10 MG/1
10 TABLET ORAL DAILY
Qty: 90 TABLET | Refills: 11 | Status: SHIPPED | OUTPATIENT
Start: 2025-06-16 | End: 2025-06-16

## 2025-06-16 NOTE — PROGRESS NOTES
Follow Up Office Visit      Date: 2025   Patient Name: Yolie Jacob  : 1935   MRN: [id number]     Chief Complaint:    Chief Complaint   Patient presents with    Hyperlipidemia     Never picked up zetia       History of Present Illness: Yolie Jacob is a 89 y.o. female who is here today to follow up with HLD. Repots couldn't afford zetia.          Subjective      Past Medical History:   Diagnosis Date    A-fib     Arthritis     Clotting disorder     H/O cardioembolic CVA 2024    Heart murmur     Hypertension     Kidney stone     Osteopenia     Pancreatitis     Stroke        Past Surgical History:   Procedure Laterality Date    APPENDECTOMY      BREAST SURGERY      CATARACT EXTRACTION       SECTION      EYE SURGERY      HEMORRHOIDECTOMY      INTRAOCULAR LENS INSERTION      TONSILLECTOMY         Family History   Problem Relation Age of Onset    Cancer Mother     Osteoporosis Mother     Heart attack Father     Hypertension Sister     Osteoporosis Sister     Stroke Sister         Social History     Socioeconomic History    Marital status:    Tobacco Use    Smoking status: Never     Passive exposure: Never    Smokeless tobacco: Never   Vaping Use    Vaping status: Never Used   Substance and Sexual Activity    Alcohol use: No    Drug use: No    Sexual activity: Defer         I have reviewed the patients family history, social history, past medical history, past surgical history and have updated it as appropriate.     Medications:     Current Outpatient Medications:     acetaminophen (TYLENOL) 325 MG tablet, Take 2 tablets by mouth Every 4 (Four) Hours As Needed for Mild Pain ., Disp: , Rfl:     apixaban (ELIQUIS) 2.5 MG tablet tablet, Take 1 tablet by mouth 2 (Two) Times a Day., Disp: 28 tablet, Rfl: 0    Cholecalciferol (Vitamin D) 50 MCG ( UT) tablet, Take 1 tablet by mouth Daily., Disp: 90 tablet, Rfl: 5    co-enzyme Q-10 30 MG capsule, Take 1 capsule by mouth 3 (Three)  "Times a Day., Disp: 90 capsule, Rfl: 11    multivitamins-minerals (PRESERVISION AREDS 2) capsule capsule, Take 1 capsule by mouth 2 (Two) Times a Day., Disp: 60 capsule, Rfl: 5    Allergies:   Allergies   Allergen Reactions    Procaine Anaphylaxis    Codeine Other (See Comments)     Passed out    Latex Itching    Levofloxacin Other (See Comments)     Levaquin    Morphine Itching    Penicillins Itching    Statins Myalgia    Sulfa Antibiotics Hives    Doxycycline Palpitations    Keflex [Cephalexin] Palpitations       Objective       Vital Signs:   Vitals:    06/16/25 1422   BP: 126/80   BP Location: Left arm   Patient Position: Sitting   Cuff Size: Adult   Pulse: 88   Resp: 20   Temp: 97.8 °F (36.6 °C)   TempSrc: Oral   SpO2: 95%   Weight: 61.7 kg (136 lb)   Height: 142.2 cm (55.98\")     Body mass index is 30.51 kg/m².    Physical Exam:  Physical Exam  Constitutional:       General: She is not in acute distress.     Appearance: Normal appearance. She is not ill-appearing.   HENT:      Nose: No congestion or rhinorrhea.      Mouth/Throat:      Mouth: Mucous membranes are moist.      Pharynx: No oropharyngeal exudate.   Eyes:      Extraocular Movements: Extraocular movements intact.      Conjunctiva/sclera: Conjunctivae normal.      Pupils: Pupils are equal, round, and reactive to light.   Cardiovascular:      Rate and Rhythm: Normal rate and regular rhythm.   Pulmonary:      Effort: Pulmonary effort is normal. No respiratory distress.      Breath sounds: Normal breath sounds.   Abdominal:      General: Abdomen is flat. Bowel sounds are normal.      Palpations: Abdomen is soft.      Tenderness: There is no abdominal tenderness.   Musculoskeletal:         General: No swelling.      Cervical back: Neck supple.      Right lower leg: No edema.      Left lower leg: No edema.   Skin:     Coloration: Skin is not jaundiced.      Findings: No rash.   Neurological:      General: No focal deficit present.      Mental Status: She " is alert and oriented to person, place, and time.      Cranial Nerves: No cranial nerve deficit.   Psychiatric:         Mood and Affect: Mood normal.         Behavior: Behavior normal.         Thought Content: Thought content normal.         Procedures    Results:       Assessment / Plan      Assessment/Plan:   Diagnoses and all orders for this visit:    1. Macular degeneration of both eyes, unspecified type (Primary)  -     multivitamins-minerals (PRESERVISION AREDS 2) capsule capsule; Take 1 capsule by mouth 2 (Two) Times a Day.  Dispense: 60 capsule; Refill: 5    2. Mixed hyperlipidemia  -     Discontinue: ezetimibe (Zetia) 10 MG tablet; Take 1 tablet by mouth Daily.  Dispense: 90 tablet; Refill: 11  -     Lipid Panel    3. Myalgia    4. Vitamin D deficiency  -     Vitamin D 25 hydroxy         Reports she has adjusted diet. Statin intolerant.  Zetia too expensive.      Refill areds.         Recheck vit d. Currently on vit d3    Follow Up:   Return in about 8 months (around 2/16/2026) for Annual physical.    DO MARISOL Juarez

## 2025-06-17 LAB — 25(OH)D3 SERPL-MCNC: 31.2 NG/ML (ref 30–100)

## 2025-06-18 DIAGNOSIS — E78.2 MIXED HYPERLIPIDEMIA: ICD-10-CM

## 2025-06-18 DIAGNOSIS — H35.30 MACULAR DEGENERATION OF BOTH EYES, UNSPECIFIED TYPE: ICD-10-CM

## 2025-06-18 DIAGNOSIS — M85.80 OSTEOPENIA, UNSPECIFIED LOCATION: ICD-10-CM

## 2025-06-18 DIAGNOSIS — R25.2 MUSCLE CRAMPING: ICD-10-CM

## 2025-06-18 DIAGNOSIS — E55.9 VITAMIN D DEFICIENCY: ICD-10-CM

## 2025-06-18 RX ORDER — ANTIOX #8/OM3/DHA/EPA/LUT/ZEAX 250-2.5 MG
1 CAPSULE ORAL 2 TIMES DAILY
Qty: 60 CAPSULE | Refills: 5 | Status: SHIPPED | OUTPATIENT
Start: 2025-06-18

## 2025-06-18 NOTE — TELEPHONE ENCOUNTER
Caller: Yolie Jacob    Relationship: Self    Best call back number: [phone]     Requested Prescriptions:   Requested Prescriptions     Pending Prescriptions Disp Refills    multivitamins-minerals (PRESERVISION AREDS 2) capsule capsule 60 capsule 5     Sig: Take 1 capsule by mouth 2 (Two) Times a Day.        [facility]arm[facility] be sent[id number] [id number] [facility] [address]  - [phone]  - [phone]      Last office visit with prescribing clinician: 6/16/2025   Last telemedicine visit with prescribing clinician: Visit date not found   Next office visit with prescribing clinician: 2/16/2026     Additional details provided by patient: NO MEDICATION ON HAND     Does the patient have less than a 3 day supply:  [x] Yes  [] No    Would you like a call back once the refill request has been completed: [x] Yes [] No    If the office needs to give you a call back, can they leave a voicemail: [] Yes [] No    Thee Ardon   06/18/25 15:32 EDT

## 2025-07-14 ENCOUNTER — TELEPHONE (OUTPATIENT)
Dept: INTERNAL MEDICINE | Age: OVER 89
End: 2025-07-14